# Patient Record
Sex: FEMALE | Race: WHITE | NOT HISPANIC OR LATINO | Employment: UNEMPLOYED | URBAN - METROPOLITAN AREA
[De-identification: names, ages, dates, MRNs, and addresses within clinical notes are randomized per-mention and may not be internally consistent; named-entity substitution may affect disease eponyms.]

---

## 2017-01-10 ENCOUNTER — COMMUNICATION - HEALTHEAST (OUTPATIENT)
Dept: NEUROLOGY | Facility: CLINIC | Age: 37
End: 2017-01-10

## 2017-02-09 ENCOUNTER — COMMUNICATION - HEALTHEAST (OUTPATIENT)
Dept: NEUROLOGY | Facility: CLINIC | Age: 37
End: 2017-02-09

## 2017-08-30 ENCOUNTER — COMMUNICATION - HEALTHEAST (OUTPATIENT)
Dept: NEUROLOGY | Facility: CLINIC | Age: 37
End: 2017-08-30

## 2017-08-30 ENCOUNTER — HOSPITAL ENCOUNTER (OUTPATIENT)
Dept: NEUROLOGY | Facility: CLINIC | Age: 37
Setting detail: THERAPIES SERIES
Discharge: STILL ADMITTED OR ADMINISTRATIVE | End: 2017-08-30
Attending: NURSE PRACTITIONER

## 2017-08-30 ENCOUNTER — AMBULATORY - HEALTHEAST (OUTPATIENT)
Dept: NEUROLOGY | Facility: CLINIC | Age: 37
End: 2017-08-30

## 2017-08-30 DIAGNOSIS — F60.3 BORDERLINE PERSONALITY DISORDER (H): ICD-10-CM

## 2017-08-30 DIAGNOSIS — F39 EPISODIC MOOD DISORDER (H): ICD-10-CM

## 2017-08-30 RX ORDER — FAMOTIDINE 20 MG/1
20 TABLET, FILM COATED ORAL 2 TIMES DAILY
Status: SHIPPED | COMMUNITY
Start: 2015-07-02

## 2017-08-30 RX ORDER — LAMOTRIGINE 150 MG/1
150 TABLET ORAL 2 TIMES DAILY
Status: SHIPPED | COMMUNITY
Start: 2017-08-30 | End: 2021-09-29

## 2017-08-30 ASSESSMENT — MIFFLIN-ST. JEOR: SCORE: 2243.93

## 2017-10-10 ENCOUNTER — HOSPITAL ENCOUNTER (OUTPATIENT)
Dept: NEUROLOGY | Facility: CLINIC | Age: 37
Setting detail: THERAPIES SERIES
Discharge: STILL ADMITTED OR ADMINISTRATIVE | End: 2017-10-10
Attending: PSYCHIATRY & NEUROLOGY

## 2017-10-10 DIAGNOSIS — S06.9XAA TBI (TRAUMATIC BRAIN INJURY) (H): ICD-10-CM

## 2017-10-10 RX ORDER — LEVETIRACETAM 500 MG/1
750 TABLET ORAL 2 TIMES DAILY
Status: SHIPPED | COMMUNITY
Start: 2017-09-27 | End: 2021-12-10

## 2017-10-12 ENCOUNTER — COMMUNICATION - HEALTHEAST (OUTPATIENT)
Dept: NEUROLOGY | Facility: CLINIC | Age: 37
End: 2017-10-12

## 2017-10-12 DIAGNOSIS — S06.9XAA TBI (TRAUMATIC BRAIN INJURY) (H): ICD-10-CM

## 2017-12-21 ENCOUNTER — COMMUNICATION - HEALTHEAST (OUTPATIENT)
Dept: NEUROLOGY | Facility: CLINIC | Age: 37
End: 2017-12-21

## 2017-12-21 DIAGNOSIS — F60.3 BORDERLINE PERSONALITY DISORDER (H): ICD-10-CM

## 2018-01-16 ENCOUNTER — RECORDS - HEALTHEAST (OUTPATIENT)
Dept: LAB | Facility: CLINIC | Age: 38
End: 2018-01-16

## 2018-01-16 LAB
ALBUMIN SERPL-MCNC: 3.5 G/DL (ref 3.5–5)
ALP SERPL-CCNC: 57 U/L (ref 45–120)
ALT SERPL W P-5'-P-CCNC: 38 U/L (ref 0–45)
ANION GAP SERPL CALCULATED.3IONS-SCNC: 11 MMOL/L (ref 5–18)
AST SERPL W P-5'-P-CCNC: 42 U/L (ref 0–40)
BILIRUB SERPL-MCNC: 0.7 MG/DL (ref 0–1)
BUN SERPL-MCNC: 7 MG/DL (ref 8–22)
CALCIUM SERPL-MCNC: 9.2 MG/DL (ref 8.5–10.5)
CHLORIDE BLD-SCNC: 102 MMOL/L (ref 98–107)
CHOLEST SERPL-MCNC: 228 MG/DL
CO2 SERPL-SCNC: 27 MMOL/L (ref 22–31)
CREAT SERPL-MCNC: 0.7 MG/DL (ref 0.6–1.1)
FASTING STATUS PATIENT QL REPORTED: NO
GFR SERPL CREATININE-BSD FRML MDRD: >60 ML/MIN/1.73M2
GLUCOSE BLD-MCNC: 92 MG/DL (ref 70–125)
HDLC SERPL-MCNC: 60 MG/DL
LDLC SERPL CALC-MCNC: 144 MG/DL
POTASSIUM BLD-SCNC: 4.2 MMOL/L (ref 3.5–5)
PROT SERPL-MCNC: 6.7 G/DL (ref 6–8)
SODIUM SERPL-SCNC: 140 MMOL/L (ref 136–145)
TRIGL SERPL-MCNC: 118 MG/DL

## 2018-01-24 ENCOUNTER — COMMUNICATION - HEALTHEAST (OUTPATIENT)
Dept: NEUROLOGY | Facility: CLINIC | Age: 38
End: 2018-01-24

## 2018-01-24 DIAGNOSIS — S06.9XAA TBI (TRAUMATIC BRAIN INJURY) (H): ICD-10-CM

## 2018-03-23 ENCOUNTER — RECORDS - HEALTHEAST (OUTPATIENT)
Dept: LAB | Facility: CLINIC | Age: 38
End: 2018-03-23

## 2018-03-23 LAB
ALBUMIN SERPL-MCNC: 3.3 G/DL (ref 3.5–5)
ALP SERPL-CCNC: 66 U/L (ref 45–120)
ALT SERPL W P-5'-P-CCNC: 40 U/L (ref 0–45)
ANION GAP SERPL CALCULATED.3IONS-SCNC: 11 MMOL/L (ref 5–18)
AST SERPL W P-5'-P-CCNC: 43 U/L (ref 0–40)
BILIRUB SERPL-MCNC: 0.6 MG/DL (ref 0–1)
BUN SERPL-MCNC: 8 MG/DL (ref 8–22)
CALCIUM SERPL-MCNC: 8.4 MG/DL (ref 8.5–10.5)
CHLORIDE BLD-SCNC: 101 MMOL/L (ref 98–107)
CHOLEST SERPL-MCNC: 233 MG/DL
CO2 SERPL-SCNC: 25 MMOL/L (ref 22–31)
CREAT SERPL-MCNC: 0.79 MG/DL (ref 0.6–1.1)
FASTING STATUS PATIENT QL REPORTED: NO
GFR SERPL CREATININE-BSD FRML MDRD: >60 ML/MIN/1.73M2
GLUCOSE BLD-MCNC: 107 MG/DL (ref 70–125)
HDLC SERPL-MCNC: 57 MG/DL
LDLC SERPL CALC-MCNC: 140 MG/DL
POTASSIUM BLD-SCNC: 4.1 MMOL/L (ref 3.5–5)
PROT SERPL-MCNC: 6.4 G/DL (ref 6–8)
SODIUM SERPL-SCNC: 137 MMOL/L (ref 136–145)
TRIGL SERPL-MCNC: 178 MG/DL

## 2018-03-27 ENCOUNTER — COMMUNICATION - HEALTHEAST (OUTPATIENT)
Dept: NEUROLOGY | Facility: CLINIC | Age: 38
End: 2018-03-27

## 2018-03-27 DIAGNOSIS — F60.3 BORDERLINE PERSONALITY DISORDER (H): ICD-10-CM

## 2018-05-30 ENCOUNTER — COMMUNICATION - HEALTHEAST (OUTPATIENT)
Dept: NEUROLOGY | Facility: CLINIC | Age: 38
End: 2018-05-30

## 2018-05-30 DIAGNOSIS — S06.9XAA TBI (TRAUMATIC BRAIN INJURY) (H): ICD-10-CM

## 2018-07-09 ENCOUNTER — RECORDS - HEALTHEAST (OUTPATIENT)
Dept: LAB | Facility: CLINIC | Age: 38
End: 2018-07-09

## 2018-07-09 LAB — HIV 1+2 AB+HIV1 P24 AG SERPL QL IA: NEGATIVE

## 2018-07-10 ENCOUNTER — COMMUNICATION - HEALTHEAST (OUTPATIENT)
Dept: NEUROLOGY | Facility: CLINIC | Age: 38
End: 2018-07-10

## 2018-07-10 LAB — HCV AB SERPL QL IA: NEGATIVE

## 2018-07-12 ENCOUNTER — HOSPITAL ENCOUNTER (OUTPATIENT)
Dept: NEUROLOGY | Facility: CLINIC | Age: 38
Setting detail: THERAPIES SERIES
Discharge: STILL ADMITTED OR ADMINISTRATIVE | End: 2018-07-12
Attending: NURSE PRACTITIONER

## 2018-07-12 DIAGNOSIS — S06.9XAA TBI (TRAUMATIC BRAIN INJURY) (H): ICD-10-CM

## 2018-07-12 DIAGNOSIS — F39 EPISODIC MOOD DISORDER (H): ICD-10-CM

## 2018-07-12 DIAGNOSIS — F60.3 BORDERLINE PERSONALITY DISORDER (H): ICD-10-CM

## 2018-07-20 ENCOUNTER — RECORDS - HEALTHEAST (OUTPATIENT)
Dept: ADMINISTRATIVE | Facility: OTHER | Age: 38
End: 2018-07-20

## 2018-08-01 ENCOUNTER — RECORDS - HEALTHEAST (OUTPATIENT)
Dept: ADMINISTRATIVE | Facility: OTHER | Age: 38
End: 2018-08-01

## 2018-09-02 ENCOUNTER — COMMUNICATION - HEALTHEAST (OUTPATIENT)
Dept: SCHEDULING | Facility: CLINIC | Age: 38
End: 2018-09-02

## 2018-09-26 ENCOUNTER — HOSPITAL ENCOUNTER (INPATIENT)
Dept: MEDSURG UNIT | Facility: CLINIC | Age: 38
Discharge: NURSING, REHAB OR LONG-TERM CARE | End: 2018-10-25
Attending: INTERNAL MEDICINE | Admitting: INTERNAL MEDICINE

## 2018-09-26 DIAGNOSIS — K59.00 CONSTIPATION, UNSPECIFIED CONSTIPATION TYPE: ICD-10-CM

## 2018-09-26 DIAGNOSIS — R23.9 ALTERATION IN SKIN INTEGRITY DUE TO MOISTURE: ICD-10-CM

## 2018-09-26 DIAGNOSIS — N17.9 ACUTE RENAL FAILURE (H): ICD-10-CM

## 2018-09-26 DIAGNOSIS — B36.9 FUNGAL RASH OF TORSO: ICD-10-CM

## 2018-09-26 DIAGNOSIS — L85.3 DRY SKIN: ICD-10-CM

## 2018-09-26 DIAGNOSIS — I51.89 DIASTOLIC DYSFUNCTION: ICD-10-CM

## 2018-09-26 DIAGNOSIS — L08.9 RIGHT FOOT INFECTION: ICD-10-CM

## 2018-09-26 DIAGNOSIS — Z87.891 PERSONAL HISTORY OF TOBACCO USE, PRESENTING HAZARDS TO HEALTH: ICD-10-CM

## 2018-09-26 DIAGNOSIS — R06.02 SOB (SHORTNESS OF BREATH): ICD-10-CM

## 2018-09-26 DIAGNOSIS — Z11.1 SCREENING-PULMONARY TB: ICD-10-CM

## 2018-09-26 DIAGNOSIS — E46 MALNUTRITION, UNSPECIFIED TYPE (H): ICD-10-CM

## 2018-09-26 DIAGNOSIS — I10 ESSENTIAL HYPERTENSION: ICD-10-CM

## 2018-09-26 DIAGNOSIS — B36.9 FUNGAL DERMATITIS: ICD-10-CM

## 2018-09-26 DIAGNOSIS — T80.1XXA IV INFILTRATE, INITIAL ENCOUNTER: ICD-10-CM

## 2018-09-26 DIAGNOSIS — T81.89XA SURGICAL WOUND, NON HEALING, INITIAL ENCOUNTER: ICD-10-CM

## 2018-09-26 DIAGNOSIS — Z78.9 DEEP VEIN THROMBOSIS (DVT) PROPHYLAXIS PRESCRIBED AT DISCHARGE: ICD-10-CM

## 2018-09-26 LAB
GLUCOSE BLDC GLUCOMTR-MCNC: 121 MG/DL
GLUCOSE BLDC GLUCOMTR-MCNC: 93 MG/DL
GLUCOSE BLDC GLUCOMTR-MCNC: 95 MG/DL

## 2018-09-26 RX ORDER — TORSEMIDE 20 MG/1
40 TABLET ORAL
Status: SHIPPED | COMMUNITY
Start: 2018-09-26

## 2018-09-26 RX ORDER — POTASSIUM CHLORIDE 1500 MG/1
20 TABLET, EXTENDED RELEASE ORAL 2 TIMES DAILY WITH MEALS
Status: SHIPPED | COMMUNITY
Start: 2018-09-26

## 2018-09-26 RX ORDER — MECLIZINE HYDROCHLORIDE 25 MG/1
25 TABLET ORAL 3 TIMES DAILY PRN
Status: SHIPPED | COMMUNITY
Start: 2018-09-26

## 2018-09-27 LAB
ALBUMIN SERPL-MCNC: 3.2 G/DL (ref 3.5–5)
ALP SERPL-CCNC: 48 U/L (ref 45–120)
ALT SERPL W P-5'-P-CCNC: 18 U/L (ref 0–45)
ANION GAP SERPL CALCULATED.3IONS-SCNC: 10 MMOL/L (ref 5–18)
AST SERPL W P-5'-P-CCNC: 24 U/L (ref 0–40)
BILIRUB DIRECT SERPL-MCNC: 0.2 MG/DL
BILIRUB SERPL-MCNC: 0.5 MG/DL (ref 0–1)
BUN SERPL-MCNC: 11 MG/DL (ref 8–22)
CALCIUM SERPL-MCNC: 9.7 MG/DL (ref 8.5–10.5)
CHLORIDE BLD-SCNC: 93 MMOL/L (ref 98–107)
CO2 SERPL-SCNC: 36 MMOL/L (ref 22–31)
CREAT SERPL-MCNC: 0.75 MG/DL (ref 0.6–1.1)
ERYTHROCYTE [DISTWIDTH] IN BLOOD BY AUTOMATED COUNT: 14 % (ref 11–14.5)
GFR SERPL CREATININE-BSD FRML MDRD: >60 ML/MIN/1.73M2
GLUCOSE BLD-MCNC: 143 MG/DL (ref 70–125)
GLUCOSE BLDC GLUCOMTR-MCNC: 107 MG/DL
GLUCOSE BLDC GLUCOMTR-MCNC: 113 MG/DL
GLUCOSE BLDC GLUCOMTR-MCNC: 87 MG/DL
GLUCOSE BLDC GLUCOMTR-MCNC: 98 MG/DL
HCT VFR BLD AUTO: 36.5 % (ref 35–47)
HGB BLD-MCNC: 11.9 G/DL (ref 12–16)
INR PPP: 1.03 (ref 0.9–1.1)
MAGNESIUM SERPL-MCNC: 1.9 MG/DL (ref 1.8–2.6)
MCH RBC QN AUTO: 29.3 PG (ref 27–34)
MCHC RBC AUTO-ENTMCNC: 32.6 G/DL (ref 32–36)
MCV RBC AUTO: 90 FL (ref 80–100)
PLATELET # BLD AUTO: 215 THOU/UL (ref 140–440)
PMV BLD AUTO: 9.8 FL (ref 8.5–12.5)
POTASSIUM BLD-SCNC: 3.1 MMOL/L (ref 3.5–5)
POTASSIUM BLD-SCNC: 3.7 MMOL/L (ref 3.5–5)
PROT SERPL-MCNC: 6.6 G/DL (ref 6–8)
RBC # BLD AUTO: 4.06 MILL/UL (ref 3.8–5.4)
SODIUM SERPL-SCNC: 139 MMOL/L (ref 136–145)
WBC: 6.3 THOU/UL (ref 4–11)

## 2018-09-28 LAB
ANION GAP SERPL CALCULATED.3IONS-SCNC: 11 MMOL/L (ref 5–18)
BACTERIA SPEC CULT: NORMAL
BUN SERPL-MCNC: 10 MG/DL (ref 8–22)
CALCIUM SERPL-MCNC: 9.2 MG/DL (ref 8.5–10.5)
CHLORIDE BLD-SCNC: 99 MMOL/L (ref 98–107)
CO2 SERPL-SCNC: 30 MMOL/L (ref 22–31)
CREAT SERPL-MCNC: 0.69 MG/DL (ref 0.6–1.1)
GFR SERPL CREATININE-BSD FRML MDRD: >60 ML/MIN/1.73M2
GLUCOSE BLD-MCNC: 95 MG/DL (ref 70–125)
GLUCOSE BLDC GLUCOMTR-MCNC: 104 MG/DL
PLATELET # BLD AUTO: 181 THOU/UL (ref 140–440)
POTASSIUM BLD-SCNC: 4 MMOL/L (ref 3.5–5)
POTASSIUM BLD-SCNC: 4 MMOL/L (ref 3.5–5)
SODIUM SERPL-SCNC: 140 MMOL/L (ref 136–145)

## 2018-09-29 LAB — POTASSIUM BLD-SCNC: 4 MMOL/L (ref 3.5–5)

## 2018-09-29 ASSESSMENT — MIFFLIN-ST. JEOR: SCORE: 2338.73

## 2018-09-30 LAB
ANION GAP SERPL CALCULATED.3IONS-SCNC: 11 MMOL/L (ref 5–18)
BUN SERPL-MCNC: 11 MG/DL (ref 8–22)
CALCIUM SERPL-MCNC: 9.3 MG/DL (ref 8.5–10.5)
CHLORIDE BLD-SCNC: 98 MMOL/L (ref 98–107)
CO2 SERPL-SCNC: 27 MMOL/L (ref 22–31)
CREAT SERPL-MCNC: 0.73 MG/DL (ref 0.6–1.1)
ERYTHROCYTE [DISTWIDTH] IN BLOOD BY AUTOMATED COUNT: 13.9 % (ref 11–14.5)
GFR SERPL CREATININE-BSD FRML MDRD: >60 ML/MIN/1.73M2
GLUCOSE BLD-MCNC: 93 MG/DL (ref 70–125)
HCT VFR BLD AUTO: 35.6 % (ref 35–47)
HGB BLD-MCNC: 11.5 G/DL (ref 12–16)
MAGNESIUM SERPL-MCNC: 2 MG/DL (ref 1.8–2.6)
MCH RBC QN AUTO: 29.2 PG (ref 27–34)
MCHC RBC AUTO-ENTMCNC: 32.3 G/DL (ref 32–36)
MCV RBC AUTO: 90 FL (ref 80–100)
PLATELET # BLD AUTO: 176 THOU/UL (ref 140–440)
PMV BLD AUTO: 10.2 FL (ref 8.5–12.5)
POTASSIUM BLD-SCNC: 4.5 MMOL/L (ref 3.5–5)
RBC # BLD AUTO: 3.94 MILL/UL (ref 3.8–5.4)
SODIUM SERPL-SCNC: 136 MMOL/L (ref 136–145)
WBC: 7.4 THOU/UL (ref 4–11)

## 2018-09-30 ASSESSMENT — MIFFLIN-ST. JEOR: SCORE: 2365.04

## 2018-10-01 LAB
GLUCOSE BLDC GLUCOMTR-MCNC: 138 MG/DL
GLUCOSE BLDC GLUCOMTR-MCNC: 188 MG/DL
GLUCOSE BLDC GLUCOMTR-MCNC: 196 MG/DL
POTASSIUM BLD-SCNC: 3.7 MMOL/L (ref 3.5–5)
VANCOMYCIN SERPL-MCNC: 20.5 UG/ML

## 2018-10-01 ASSESSMENT — MIFFLIN-ST. JEOR: SCORE: 2371.85

## 2018-10-02 LAB
BUN SERPL-MCNC: 11 MG/DL (ref 8–22)
CREAT SERPL-MCNC: 0.7 MG/DL (ref 0.6–1.1)
GFR SERPL CREATININE-BSD FRML MDRD: >60 ML/MIN/1.73M2
GLUCOSE BLDC GLUCOMTR-MCNC: 124 MG/DL
GLUCOSE BLDC GLUCOMTR-MCNC: 132 MG/DL
GLUCOSE BLDC GLUCOMTR-MCNC: 174 MG/DL
GLUCOSE BLDC GLUCOMTR-MCNC: 91 MG/DL
PLATELET # BLD AUTO: 171 THOU/UL (ref 140–440)
POTASSIUM BLD-SCNC: 4.3 MMOL/L (ref 3.5–5)

## 2018-10-03 LAB
GLUCOSE BLDC GLUCOMTR-MCNC: 189 MG/DL
GLUCOSE BLDC GLUCOMTR-MCNC: 291 MG/DL
GLUCOSE BLDC GLUCOMTR-MCNC: 83 MG/DL
GLUCOSE BLDC GLUCOMTR-MCNC: 92 MG/DL

## 2018-10-03 ASSESSMENT — MIFFLIN-ST. JEOR: SCORE: 2328.75

## 2018-10-04 LAB
ANION GAP SERPL CALCULATED.3IONS-SCNC: 10 MMOL/L (ref 5–18)
BUN SERPL-MCNC: 18 MG/DL (ref 8–22)
CALCIUM SERPL-MCNC: 9.5 MG/DL (ref 8.5–10.5)
CHLORIDE BLD-SCNC: 100 MMOL/L (ref 98–107)
CO2 SERPL-SCNC: 29 MMOL/L (ref 22–31)
CREAT SERPL-MCNC: 0.72 MG/DL (ref 0.6–1.1)
GFR SERPL CREATININE-BSD FRML MDRD: >60 ML/MIN/1.73M2
GLUCOSE BLD-MCNC: 87 MG/DL (ref 70–125)
GLUCOSE BLDC GLUCOMTR-MCNC: 112 MG/DL
GLUCOSE BLDC GLUCOMTR-MCNC: 159 MG/DL
GLUCOSE BLDC GLUCOMTR-MCNC: 161 MG/DL
GLUCOSE BLDC GLUCOMTR-MCNC: 97 MG/DL
HGB BLD-MCNC: 11.3 G/DL (ref 12–16)
MAGNESIUM SERPL-MCNC: 2.5 MG/DL (ref 1.8–2.6)
POTASSIUM BLD-SCNC: 4.1 MMOL/L (ref 3.5–5)
SODIUM SERPL-SCNC: 139 MMOL/L (ref 136–145)

## 2018-10-05 LAB
GLUCOSE BLDC GLUCOMTR-MCNC: 155 MG/DL
GLUCOSE BLDC GLUCOMTR-MCNC: 98 MG/DL

## 2018-10-06 ASSESSMENT — MIFFLIN-ST. JEOR: SCORE: 2288.38

## 2018-10-07 LAB
ANION GAP SERPL CALCULATED.3IONS-SCNC: 10 MMOL/L (ref 5–18)
BUN SERPL-MCNC: 14 MG/DL (ref 8–22)
CALCIUM SERPL-MCNC: 9.5 MG/DL (ref 8.5–10.5)
CHLORIDE BLD-SCNC: 97 MMOL/L (ref 98–107)
CO2 SERPL-SCNC: 32 MMOL/L (ref 22–31)
CREAT SERPL-MCNC: 0.77 MG/DL (ref 0.6–1.1)
GFR SERPL CREATININE-BSD FRML MDRD: >60 ML/MIN/1.73M2
GLUCOSE BLD-MCNC: 93 MG/DL (ref 70–125)
POTASSIUM BLD-SCNC: 3.8 MMOL/L (ref 3.5–5)
SODIUM SERPL-SCNC: 139 MMOL/L (ref 136–145)

## 2018-10-07 ASSESSMENT — MIFFLIN-ST. JEOR: SCORE: 2260.26

## 2018-10-08 LAB
ANION GAP SERPL CALCULATED.3IONS-SCNC: 10 MMOL/L (ref 5–18)
BUN SERPL-MCNC: 18 MG/DL (ref 8–22)
CALCIUM SERPL-MCNC: 9.6 MG/DL (ref 8.5–10.5)
CHLORIDE BLD-SCNC: 97 MMOL/L (ref 98–107)
CO2 SERPL-SCNC: 31 MMOL/L (ref 22–31)
CREAT SERPL-MCNC: 0.76 MG/DL (ref 0.6–1.1)
GFR SERPL CREATININE-BSD FRML MDRD: >60 ML/MIN/1.73M2
GLUCOSE BLD-MCNC: 100 MG/DL (ref 70–125)
POTASSIUM BLD-SCNC: 3.7 MMOL/L (ref 3.5–5)
SODIUM SERPL-SCNC: 138 MMOL/L (ref 136–145)

## 2018-10-08 ASSESSMENT — MIFFLIN-ST. JEOR: SCORE: 2259.35

## 2018-10-09 ASSESSMENT — MIFFLIN-ST. JEOR: SCORE: 2278.4

## 2018-10-10 LAB — GLUCOSE BLDC GLUCOMTR-MCNC: 115 MG/DL

## 2018-10-12 ASSESSMENT — MIFFLIN-ST. JEOR: SCORE: 2283.85

## 2018-10-13 ASSESSMENT — MIFFLIN-ST. JEOR: SCORE: 2250.28

## 2018-10-15 LAB
BASOPHILS # BLD AUTO: 0 THOU/UL (ref 0–0.2)
BASOPHILS NFR BLD AUTO: 0 % (ref 0–2)
C REACTIVE PROTEIN LHE: 5.3 MG/DL (ref 0–0.8)
EOSINOPHIL # BLD AUTO: 0.3 THOU/UL (ref 0–0.4)
EOSINOPHIL NFR BLD AUTO: 4 % (ref 0–6)
ERYTHROCYTE [DISTWIDTH] IN BLOOD BY AUTOMATED COUNT: 14 % (ref 11–14.5)
HCT VFR BLD AUTO: 37.9 % (ref 35–47)
HGB BLD-MCNC: 12.4 G/DL (ref 12–16)
LYMPHOCYTES # BLD AUTO: 2.1 THOU/UL (ref 0.8–4.4)
LYMPHOCYTES NFR BLD AUTO: 23 % (ref 20–40)
MCH RBC QN AUTO: 28.6 PG (ref 27–34)
MCHC RBC AUTO-ENTMCNC: 32.7 G/DL (ref 32–36)
MCV RBC AUTO: 88 FL (ref 80–100)
MONOCYTES # BLD AUTO: 0.6 THOU/UL (ref 0–0.9)
MONOCYTES NFR BLD AUTO: 6 % (ref 2–10)
NEUTROPHILS # BLD AUTO: 6.3 THOU/UL (ref 2–7.7)
NEUTROPHILS NFR BLD AUTO: 67 % (ref 50–70)
PLATELET # BLD AUTO: 168 THOU/UL (ref 140–440)
PMV BLD AUTO: 10.1 FL (ref 8.5–12.5)
RBC # BLD AUTO: 4.33 MILL/UL (ref 3.8–5.4)
WBC: 9.4 THOU/UL (ref 4–11)

## 2018-10-15 ASSESSMENT — MIFFLIN-ST. JEOR: SCORE: 2287.48

## 2018-10-16 ENCOUNTER — COMMUNICATION - HEALTHEAST (OUTPATIENT)
Dept: NEUROLOGY | Facility: CLINIC | Age: 38
End: 2018-10-16

## 2018-10-16 ASSESSMENT — MIFFLIN-ST. JEOR: SCORE: 2287.48

## 2018-10-17 LAB
ANION GAP SERPL CALCULATED.3IONS-SCNC: 12 MMOL/L (ref 5–18)
BUN SERPL-MCNC: 17 MG/DL (ref 8–22)
CALCIUM SERPL-MCNC: 9.9 MG/DL (ref 8.5–10.5)
CHLORIDE BLD-SCNC: 98 MMOL/L (ref 98–107)
CO2 SERPL-SCNC: 29 MMOL/L (ref 22–31)
CREAT SERPL-MCNC: 0.68 MG/DL (ref 0.6–1.1)
GFR SERPL CREATININE-BSD FRML MDRD: >60 ML/MIN/1.73M2
GLUCOSE BLD-MCNC: 121 MG/DL (ref 70–125)
MAGNESIUM SERPL-MCNC: 2 MG/DL (ref 1.8–2.6)
POTASSIUM BLD-SCNC: 3.9 MMOL/L (ref 3.5–5)
SODIUM SERPL-SCNC: 139 MMOL/L (ref 136–145)

## 2018-10-17 ASSESSMENT — MIFFLIN-ST. JEOR: SCORE: 2301.99

## 2018-10-20 ASSESSMENT — MIFFLIN-ST. JEOR: SCORE: 2292.01

## 2018-10-21 ASSESSMENT — MIFFLIN-ST. JEOR: SCORE: 2285.66

## 2018-10-22 ASSESSMENT — MIFFLIN-ST. JEOR: SCORE: 2318.77

## 2018-10-23 LAB
ANION GAP SERPL CALCULATED.3IONS-SCNC: 11 MMOL/L (ref 5–18)
BUN SERPL-MCNC: 21 MG/DL (ref 8–22)
CALCIUM SERPL-MCNC: 9.6 MG/DL (ref 8.5–10.5)
CHLORIDE BLD-SCNC: 99 MMOL/L (ref 98–107)
CO2 SERPL-SCNC: 28 MMOL/L (ref 22–31)
CREAT SERPL-MCNC: 0.74 MG/DL (ref 0.6–1.1)
ERYTHROCYTE [DISTWIDTH] IN BLOOD BY AUTOMATED COUNT: 14.7 % (ref 11–14.5)
GFR SERPL CREATININE-BSD FRML MDRD: >60 ML/MIN/1.73M2
GLUCOSE BLD-MCNC: 198 MG/DL (ref 70–125)
HCT VFR BLD AUTO: 36.9 % (ref 35–47)
HGB BLD-MCNC: 11.9 G/DL (ref 12–16)
MAGNESIUM SERPL-MCNC: 1.9 MG/DL (ref 1.8–2.6)
MCH RBC QN AUTO: 28.6 PG (ref 27–34)
MCHC RBC AUTO-ENTMCNC: 32.2 G/DL (ref 32–36)
MCV RBC AUTO: 89 FL (ref 80–100)
PLATELET # BLD AUTO: 197 THOU/UL (ref 140–440)
PMV BLD AUTO: 10.1 FL (ref 8.5–12.5)
POTASSIUM BLD-SCNC: 3.8 MMOL/L (ref 3.5–5)
RBC # BLD AUTO: 4.16 MILL/UL (ref 3.8–5.4)
SODIUM SERPL-SCNC: 138 MMOL/L (ref 136–145)
WBC: 7.1 THOU/UL (ref 4–11)

## 2018-10-24 RX ORDER — BISACODYL 10 MG
SUPPOSITORY, RECTAL RECTAL
Refills: 0 | Status: SHIPPED
Start: 2018-10-24

## 2018-10-24 RX ORDER — ARGININE/GLUTAMINE/CALCIUM BMB 7G-7G-1.5G
1 POWDER IN PACKET (EA) ORAL 2 TIMES DAILY
Refills: 0 | Status: SHIPPED
Start: 2018-10-24

## 2018-10-24 RX ORDER — METOPROLOL TARTRATE 25 MG/1
12.5 TABLET, FILM COATED ORAL 2 TIMES DAILY
Refills: 0 | Status: SHIPPED
Start: 2018-10-24

## 2018-10-24 RX ORDER — MINERAL OIL/HYDROPHIL PETROLAT
OINTMENT (GRAM) TOPICAL
Refills: 0 | Status: SHIPPED
Start: 2018-10-24

## 2018-10-24 RX ORDER — ALBUTEROL SULFATE 0.83 MG/ML
2.5 SOLUTION RESPIRATORY (INHALATION) EVERY 4 HOURS PRN
Refills: 0 | Status: SHIPPED
Start: 2018-10-24

## 2018-10-24 RX ORDER — AMLODIPINE BESYLATE 10 MG/1
10 TABLET ORAL EVERY MORNING
Refills: 0 | Status: SHIPPED
Start: 2018-10-25

## 2018-10-24 RX ORDER — MULTIPLE VITAMINS W/ MINERALS TAB 9MG-400MCG
1 TAB ORAL DAILY
Refills: 0 | Status: SHIPPED
Start: 2018-10-25

## 2018-10-25 RX ORDER — FOLIC ACID 1 MG/1
1 TABLET ORAL DAILY
Refills: 0 | Status: SHIPPED
Start: 2018-10-25

## 2018-10-29 ENCOUNTER — RECORDS - HEALTHEAST (OUTPATIENT)
Dept: LAB | Facility: CLINIC | Age: 38
End: 2018-10-29

## 2018-10-29 LAB
ANION GAP SERPL CALCULATED.3IONS-SCNC: 10 MMOL/L (ref 5–18)
BUN SERPL-MCNC: 10 MG/DL (ref 8–22)
CALCIUM SERPL-MCNC: 9.4 MG/DL (ref 8.5–10.5)
CHLORIDE BLD-SCNC: 99 MMOL/L (ref 98–107)
CO2 SERPL-SCNC: 31 MMOL/L (ref 22–31)
CREAT SERPL-MCNC: 0.59 MG/DL (ref 0.6–1.1)
ERYTHROCYTE [DISTWIDTH] IN BLOOD BY AUTOMATED COUNT: 14.4 % (ref 11–14.5)
GFR SERPL CREATININE-BSD FRML MDRD: >60 ML/MIN/1.73M2
GLUCOSE BLD-MCNC: 101 MG/DL (ref 70–125)
HCT VFR BLD AUTO: 36.7 % (ref 35–47)
HGB BLD-MCNC: 11.4 G/DL (ref 12–16)
MAGNESIUM SERPL-MCNC: 1.8 MG/DL (ref 1.8–2.6)
MCH RBC QN AUTO: 28.2 PG (ref 27–34)
MCHC RBC AUTO-ENTMCNC: 31.1 G/DL (ref 32–36)
MCV RBC AUTO: 91 FL (ref 80–100)
PLATELET # BLD AUTO: 188 THOU/UL (ref 140–440)
PMV BLD AUTO: 10.1 FL (ref 8.5–12.5)
POTASSIUM BLD-SCNC: 3.7 MMOL/L (ref 3.5–5)
RBC # BLD AUTO: 4.04 MILL/UL (ref 3.8–5.4)
SODIUM SERPL-SCNC: 140 MMOL/L (ref 136–145)
WBC: 7.9 THOU/UL (ref 4–11)

## 2019-01-02 ENCOUNTER — RECORDS - HEALTHEAST (OUTPATIENT)
Dept: ADMINISTRATIVE | Facility: OTHER | Age: 39
End: 2019-01-02

## 2019-01-30 ENCOUNTER — COMMUNICATION - HEALTHEAST (OUTPATIENT)
Dept: NEUROLOGY | Facility: CLINIC | Age: 39
End: 2019-01-30

## 2019-01-30 DIAGNOSIS — F60.3 BORDERLINE PERSONALITY DISORDER (H): ICD-10-CM

## 2019-03-28 ENCOUNTER — COMMUNICATION - HEALTHEAST (OUTPATIENT)
Dept: NEUROLOGY | Facility: CLINIC | Age: 39
End: 2019-03-28

## 2019-03-28 DIAGNOSIS — S06.9XAA TBI (TRAUMATIC BRAIN INJURY) (H): ICD-10-CM

## 2019-05-08 ENCOUNTER — COMMUNICATION - HEALTHEAST (OUTPATIENT)
Dept: NEUROLOGY | Facility: CLINIC | Age: 39
End: 2019-05-08

## 2019-05-08 DIAGNOSIS — S06.9XAA TBI (TRAUMATIC BRAIN INJURY) (H): ICD-10-CM

## 2019-08-12 ENCOUNTER — COMMUNICATION - HEALTHEAST (OUTPATIENT)
Dept: NEUROLOGY | Facility: CLINIC | Age: 39
End: 2019-08-12

## 2019-08-12 DIAGNOSIS — F60.3 BORDERLINE PERSONALITY DISORDER (H): ICD-10-CM

## 2020-01-02 ENCOUNTER — COMMUNICATION - HEALTHEAST (OUTPATIENT)
Dept: NEUROLOGY | Facility: CLINIC | Age: 40
End: 2020-01-02

## 2020-01-02 DIAGNOSIS — S06.9XAA TBI (TRAUMATIC BRAIN INJURY) (H): ICD-10-CM

## 2020-01-02 DIAGNOSIS — F60.3 BORDERLINE PERSONALITY DISORDER (H): ICD-10-CM

## 2020-01-02 RX ORDER — ARIPIPRAZOLE 5 MG/1
5 TABLET ORAL DAILY
Qty: 30 TABLET | Refills: 4 | Status: SHIPPED | OUTPATIENT
Start: 2020-01-02

## 2020-01-02 RX ORDER — OLANZAPINE 2.5 MG/1
TABLET, FILM COATED ORAL
Qty: 90 TABLET | Refills: 4 | Status: SHIPPED | OUTPATIENT
Start: 2020-01-02

## 2020-01-02 RX ORDER — VENLAFAXINE HYDROCHLORIDE 37.5 MG/1
37.5 CAPSULE, EXTENDED RELEASE ORAL DAILY
Qty: 30 CAPSULE | Refills: 4 | Status: SHIPPED | OUTPATIENT
Start: 2020-01-02

## 2020-06-30 ENCOUNTER — RECORDS - HEALTHEAST (OUTPATIENT)
Dept: LAB | Facility: CLINIC | Age: 40
End: 2020-06-30

## 2020-06-30 LAB
ALBUMIN SERPL-MCNC: 3.7 G/DL (ref 3.5–5)
ALP SERPL-CCNC: 68 U/L (ref 45–120)
ALT SERPL W P-5'-P-CCNC: 12 U/L (ref 0–45)
ANION GAP SERPL CALCULATED.3IONS-SCNC: 13 MMOL/L (ref 5–18)
AST SERPL W P-5'-P-CCNC: 15 U/L (ref 0–40)
BILIRUB SERPL-MCNC: 0.3 MG/DL (ref 0–1)
BUN SERPL-MCNC: 9 MG/DL (ref 8–22)
CALCIUM SERPL-MCNC: 9.4 MG/DL (ref 8.5–10.5)
CHLORIDE BLD-SCNC: 99 MMOL/L (ref 98–107)
CHOLEST SERPL-MCNC: 203 MG/DL
CO2 SERPL-SCNC: 25 MMOL/L (ref 22–31)
CREAT SERPL-MCNC: 0.73 MG/DL (ref 0.6–1.1)
FASTING STATUS PATIENT QL REPORTED: YES
GFR SERPL CREATININE-BSD FRML MDRD: >60 ML/MIN/1.73M2
GLUCOSE BLD-MCNC: 98 MG/DL (ref 70–125)
HDLC SERPL-MCNC: 45 MG/DL
LDLC SERPL CALC-MCNC: 121 MG/DL
POTASSIUM BLD-SCNC: 4.1 MMOL/L (ref 3.5–5)
PROT SERPL-MCNC: 7.3 G/DL (ref 6–8)
SODIUM SERPL-SCNC: 137 MMOL/L (ref 136–145)
TRIGL SERPL-MCNC: 184 MG/DL

## 2020-07-01 LAB — HBA1C MFR BLD: 5.3 %

## 2020-07-15 ENCOUNTER — COMMUNICATION - HEALTHEAST (OUTPATIENT)
Dept: VASCULAR SURGERY | Facility: CLINIC | Age: 40
End: 2020-07-15

## 2020-07-15 ENCOUNTER — AMBULATORY - HEALTHEAST (OUTPATIENT)
Dept: VASCULAR SURGERY | Facility: CLINIC | Age: 40
End: 2020-07-15

## 2020-07-15 DIAGNOSIS — R60.0 BILATERAL LEG EDEMA: ICD-10-CM

## 2020-10-26 ENCOUNTER — OFFICE VISIT - HEALTHEAST (OUTPATIENT)
Dept: VASCULAR SURGERY | Facility: CLINIC | Age: 40
End: 2020-10-26

## 2020-10-26 DIAGNOSIS — I89.0 ACQUIRED LYMPHEDEMA OF LEG: ICD-10-CM

## 2020-10-26 DIAGNOSIS — I87.8 VENOUS STASIS: ICD-10-CM

## 2020-10-26 DIAGNOSIS — M79.89 LEG SWELLING: ICD-10-CM

## 2020-10-26 DIAGNOSIS — I87.323 CHRONIC VENOUS HYPERTENSION (IDIOPATHIC) WITH INFLAMMATION OF BILATERAL LOWER EXTREMITY: ICD-10-CM

## 2020-10-26 DIAGNOSIS — Z72.0 TOBACCO ABUSE: ICD-10-CM

## 2020-10-26 DIAGNOSIS — E66.01 MORBID OBESITY WITH BMI OF 60.0-69.9, ADULT (H): ICD-10-CM

## 2020-10-26 DIAGNOSIS — I73.9 CLAUDICATION (H): ICD-10-CM

## 2020-10-26 DIAGNOSIS — I69.351 HEMIPARESIS AFFECTING RIGHT SIDE AS LATE EFFECT OF CEREBROVASCULAR ACCIDENT (CVA) (H): ICD-10-CM

## 2020-10-26 DIAGNOSIS — I11.0 HYPERTENSIVE HEART FAILURE (H): ICD-10-CM

## 2020-10-26 RX ORDER — GABAPENTIN 300 MG/1
900 CAPSULE ORAL AT BEDTIME
Status: SHIPPED | COMMUNITY
Start: 2019-12-18 | End: 2021-12-10

## 2020-10-26 RX ORDER — BISOPROLOL FUMARATE 5 MG/1
2.5 TABLET, FILM COATED ORAL DAILY
Status: SHIPPED | COMMUNITY
Start: 2020-08-20 | End: 2021-09-17

## 2020-10-26 RX ORDER — GABAPENTIN 300 MG/1
600 CAPSULE ORAL 2 TIMES DAILY
Status: SHIPPED | COMMUNITY
Start: 2018-12-18

## 2020-10-26 RX ORDER — CHLORAL HYDRATE 500 MG
1 CAPSULE ORAL 2 TIMES DAILY
Status: SHIPPED | COMMUNITY
Start: 2020-05-22

## 2020-10-26 RX ORDER — IPRATROPIUM BROMIDE AND ALBUTEROL SULFATE 2.5; .5 MG/3ML; MG/3ML
SOLUTION RESPIRATORY (INHALATION) EVERY 4 HOURS
Status: SHIPPED | COMMUNITY
Start: 2019-01-02

## 2020-10-26 RX ORDER — NICOTINE 21 MG/24HR
1 PATCH, TRANSDERMAL 24 HOURS TRANSDERMAL DAILY
Status: SHIPPED | COMMUNITY
Start: 2020-08-20

## 2020-10-26 RX ORDER — SPIRONOLACTONE 25 MG/1
25 TABLET ORAL EVERY MORNING
Status: SHIPPED | COMMUNITY
Start: 2019-11-28

## 2020-10-26 RX ORDER — BUMETANIDE 2 MG/1
2 TABLET ORAL 2 TIMES DAILY
Status: SHIPPED | COMMUNITY
Start: 2020-02-15

## 2020-10-26 ASSESSMENT — MIFFLIN-ST. JEOR: SCORE: 2309.7

## 2020-10-29 ENCOUNTER — RECORDS - HEALTHEAST (OUTPATIENT)
Dept: VASCULAR ULTRASOUND | Facility: CLINIC | Age: 40
End: 2020-10-29

## 2020-10-29 DIAGNOSIS — I87.323 CHRONIC VENOUS HYPERTENSION (IDIOPATHIC) WITH INFLAMMATION OF BILATERAL LOWER EXTREMITY: ICD-10-CM

## 2020-10-29 DIAGNOSIS — I69.351 HEMIPLEGIA AND HEMIPARESIS FOLLOWING CEREBRAL INFARCTION AFFECTING RIGHT DOMINANT SIDE (H): ICD-10-CM

## 2020-10-29 DIAGNOSIS — M79.89 OTHER SPECIFIED SOFT TISSUE DISORDERS: ICD-10-CM

## 2020-10-29 DIAGNOSIS — Z72.0 TOBACCO USE: ICD-10-CM

## 2020-10-29 DIAGNOSIS — E66.01 MORBID (SEVERE) OBESITY DUE TO EXCESS CALORIES (H): ICD-10-CM

## 2020-10-29 DIAGNOSIS — I11.0 HYPERTENSIVE HEART DISEASE WITH HEART FAILURE (H): ICD-10-CM

## 2020-10-29 DIAGNOSIS — I73.9 PERIPHERAL VASCULAR DISEASE, UNSPECIFIED (H): ICD-10-CM

## 2020-10-29 DIAGNOSIS — I87.8 OTHER SPECIFIED DISORDERS OF VEINS: ICD-10-CM

## 2020-10-29 DIAGNOSIS — I89.0 LYMPHEDEMA, NOT ELSEWHERE CLASSIFIED: ICD-10-CM

## 2020-11-03 ENCOUNTER — COMMUNICATION - HEALTHEAST (OUTPATIENT)
Dept: VASCULAR SURGERY | Facility: CLINIC | Age: 40
End: 2020-11-03

## 2020-11-04 ENCOUNTER — COMMUNICATION - HEALTHEAST (OUTPATIENT)
Dept: FAMILY MEDICINE | Facility: CLINIC | Age: 40
End: 2020-11-04

## 2020-11-06 ENCOUNTER — OFFICE VISIT - HEALTHEAST (OUTPATIENT)
Dept: FAMILY MEDICINE | Facility: CLINIC | Age: 40
End: 2020-11-06

## 2020-11-06 ENCOUNTER — COMMUNICATION - HEALTHEAST (OUTPATIENT)
Dept: FAMILY MEDICINE | Facility: CLINIC | Age: 40
End: 2020-11-06

## 2020-11-06 DIAGNOSIS — E66.813 CLASS 3 OBESITY WITH ALVEOLAR HYPOVENTILATION, SERIOUS COMORBIDITY, AND BODY MASS INDEX (BMI) OF 60.0 TO 69.9 IN ADULT (H): ICD-10-CM

## 2020-11-06 DIAGNOSIS — I10 ESSENTIAL HYPERTENSION: ICD-10-CM

## 2020-11-06 DIAGNOSIS — G47.33 OSA (OBSTRUCTIVE SLEEP APNEA): ICD-10-CM

## 2020-11-06 DIAGNOSIS — E66.2 CLASS 3 OBESITY WITH ALVEOLAR HYPOVENTILATION, SERIOUS COMORBIDITY, AND BODY MASS INDEX (BMI) OF 60.0 TO 69.9 IN ADULT (H): ICD-10-CM

## 2020-11-06 DIAGNOSIS — E78.5 HYPERLIPIDEMIA, UNSPECIFIED HYPERLIPIDEMIA TYPE: ICD-10-CM

## 2020-11-06 DIAGNOSIS — E11.9 TYPE 2 DIABETES MELLITUS WITHOUT COMPLICATION, WITHOUT LONG-TERM CURRENT USE OF INSULIN (H): ICD-10-CM

## 2020-11-06 ASSESSMENT — MIFFLIN-ST. JEOR: SCORE: 2309.7

## 2020-11-06 NOTE — ASSESSMENT & PLAN NOTE
ASSESSMENT/PLAN  PATIENT IS BEGINNING ACTIVE MEDICALLY SUPERVISED WEIGHT LOSS STARTING AT Body mass index is 68.41 kg/m .     Patient declined 24 week weight loss program due to cost  Patient declined  3 pack health coaching due to cost.   We did not discuss food supplements.     We discussed surgical weight loss options given her BMI is 68 and she  has comorbid conditions:multiple see problem list.      Medications started today: saxenda, if not affordable try victoza or naltrexone.     Current goal(s): switch sugar beverages to water.     Lab assessment plan: declinedlabs due to immobility     Follow up 11/11/20 with dietician and in 1 month with me. .     DISCUSSION _________________________________________________________________    Dx:  Initial bmi isBody mass index is 68.41 kg/m .  With the following weight related comorbid medical conditions: Hypertension, CARMENZA, Asthma, hyperlipidemia, pulmonary hypertension, hx of CVA, hx of CHF,     BECAUSE OF ABOVE PROBLEMS IT IS STRONGLY ADVISED THAT Marlo LOSE WEIGHT.  BELOW IS MY PLAN FOR her     WalkerKia PA-C  is her primary care provider - who referred her here today for help with weight loss.    Start weight 375 lbs, Body mass index is 68.41 kg/m .  11/13/2020     Medication notes:   Medications she takes, that are known to increase weight: zyprexa, metoprolol, effexor, abilify, robaxin,   Medications she takes, known to decrease weight: none, but starting liraglutide  Medications she takes whose dose may be affected by weight changes: lamictal, keppra, bumex, metoprolol, psych meds    wellbutrin and phentermine contraindicated due to history of seizure disorder.     saxenda - she wants to try,     but if not covered would like to try naltrexone.       Potential barriers to weight loss identified thus far:   -does not drink water  -she has been to the food shelf this year.   -craves sweets, candy, chocolate  -Sleep apnea  -Chronic pain (knee/ back)  potentially reducing ability to be active-physical therapy could be offered  - fatigue making it difficult to be active  -genetic component  - eats wrong types of foods  -sedentary lifestyle  -snacks between meals daily  -eats starches daily  -eats while watching TV daily      Strengths that may help weight loss:  -likes veggies  -lives with her dad and her boyfriend who are supportive of her health goals.

## 2020-11-06 NOTE — ASSESSMENT & PLAN NOTE
Weight reduction recommended.  Cannot recall if we discussed importance of wearing cpap today to help weight loss, will need to makesure and cover this at follow up.

## 2020-11-11 ENCOUNTER — OFFICE VISIT - HEALTHEAST (OUTPATIENT)
Dept: FAMILY MEDICINE | Facility: CLINIC | Age: 40
End: 2020-11-11

## 2020-11-11 ENCOUNTER — OFFICE VISIT - HEALTHEAST (OUTPATIENT)
Dept: PHARMACY | Facility: CLINIC | Age: 40
End: 2020-11-11

## 2020-11-11 ENCOUNTER — COMMUNICATION - HEALTHEAST (OUTPATIENT)
Dept: NURSING | Facility: CLINIC | Age: 40
End: 2020-11-11

## 2020-11-11 DIAGNOSIS — E66.813 CLASS 3 OBESITY WITH ALVEOLAR HYPOVENTILATION, SERIOUS COMORBIDITY, AND BODY MASS INDEX (BMI) OF 60.0 TO 69.9 IN ADULT (H): ICD-10-CM

## 2020-11-11 DIAGNOSIS — I50.33 ACUTE ON CHRONIC DIASTOLIC CHF (CONGESTIVE HEART FAILURE) (H): ICD-10-CM

## 2020-11-11 DIAGNOSIS — E66.01 MORBID OBESITY WITH BMI OF 60.0-69.9, ADULT (H): ICD-10-CM

## 2020-11-11 DIAGNOSIS — E66.2 CLASS 3 OBESITY WITH ALVEOLAR HYPOVENTILATION, SERIOUS COMORBIDITY, AND BODY MASS INDEX (BMI) OF 60.0 TO 69.9 IN ADULT (H): ICD-10-CM

## 2020-11-11 DIAGNOSIS — Z71.3 NUTRITIONAL COUNSELING: ICD-10-CM

## 2020-11-11 DIAGNOSIS — E78.5 HYPERLIPIDEMIA, UNSPECIFIED HYPERLIPIDEMIA TYPE: ICD-10-CM

## 2020-11-11 RX ORDER — LIRAGLUTIDE 6 MG/ML
0.6 INJECTION SUBCUTANEOUS DAILY
Refills: 11 | Status: SHIPPED | OUTPATIENT
Start: 2020-11-11 | End: 2021-12-10

## 2020-11-11 RX ORDER — LIRAGLUTIDE 6 MG/ML
INJECTION SUBCUTANEOUS
Qty: 9 ML | Status: SHIPPED | OUTPATIENT
Start: 2020-11-11 | End: 2021-12-10

## 2020-11-11 RX ORDER — METHOCARBAMOL 750 MG/1
1 TABLET, FILM COATED ORAL 2 TIMES DAILY
Status: SHIPPED | COMMUNITY
Start: 2020-11-06

## 2020-11-11 RX ORDER — LIRAGLUTIDE 6 MG/ML
1.2 INJECTION SUBCUTANEOUS DAILY
Refills: 11 | Status: SHIPPED | OUTPATIENT
Start: 2020-11-18 | End: 2021-12-10

## 2020-11-11 RX ORDER — LIRAGLUTIDE 6 MG/ML
1.8 INJECTION SUBCUTANEOUS DAILY
Refills: 11 | Status: SHIPPED | OUTPATIENT
Start: 2020-11-25 | End: 2021-12-10

## 2020-11-16 ENCOUNTER — COMMUNICATION - HEALTHEAST (OUTPATIENT)
Dept: NURSING | Facility: CLINIC | Age: 40
End: 2020-11-16

## 2020-11-16 ENCOUNTER — OFFICE VISIT - HEALTHEAST (OUTPATIENT)
Dept: PHARMACY | Facility: CLINIC | Age: 40
End: 2020-11-16

## 2020-11-16 DIAGNOSIS — E66.01 MORBID OBESITY WITH BMI OF 60.0-69.9, ADULT (H): ICD-10-CM

## 2020-11-30 ENCOUNTER — OFFICE VISIT - HEALTHEAST (OUTPATIENT)
Dept: PHARMACY | Facility: CLINIC | Age: 40
End: 2020-11-30

## 2020-11-30 DIAGNOSIS — E66.01 MORBID OBESITY WITH BMI OF 60.0-69.9, ADULT (H): ICD-10-CM

## 2020-11-30 PROCEDURE — 99605 MTMS BY PHARM NP 15 MIN: CPT | Performed by: PHARMACIST

## 2020-12-08 ENCOUNTER — OFFICE VISIT - HEALTHEAST (OUTPATIENT)
Dept: FAMILY MEDICINE | Facility: CLINIC | Age: 40
End: 2020-12-08

## 2020-12-08 DIAGNOSIS — Z71.3 NUTRITIONAL COUNSELING: ICD-10-CM

## 2020-12-08 DIAGNOSIS — E11.9 TYPE 2 DIABETES MELLITUS WITHOUT COMPLICATION, WITHOUT LONG-TERM CURRENT USE OF INSULIN (H): ICD-10-CM

## 2020-12-08 DIAGNOSIS — E66.2 CLASS 3 OBESITY WITH ALVEOLAR HYPOVENTILATION, SERIOUS COMORBIDITY, AND BODY MASS INDEX (BMI) OF 60.0 TO 69.9 IN ADULT (H): ICD-10-CM

## 2020-12-08 DIAGNOSIS — E78.5 HYPERLIPIDEMIA, UNSPECIFIED HYPERLIPIDEMIA TYPE: ICD-10-CM

## 2020-12-08 DIAGNOSIS — E66.813 CLASS 3 OBESITY WITH ALVEOLAR HYPOVENTILATION, SERIOUS COMORBIDITY, AND BODY MASS INDEX (BMI) OF 60.0 TO 69.9 IN ADULT (H): ICD-10-CM

## 2020-12-08 DIAGNOSIS — I50.33 ACUTE ON CHRONIC DIASTOLIC CHF (CONGESTIVE HEART FAILURE) (H): ICD-10-CM

## 2021-01-05 ENCOUNTER — OFFICE VISIT - HEALTHEAST (OUTPATIENT)
Dept: FAMILY MEDICINE | Facility: CLINIC | Age: 41
End: 2021-01-05

## 2021-01-05 DIAGNOSIS — E78.5 HYPERLIPIDEMIA, UNSPECIFIED HYPERLIPIDEMIA TYPE: ICD-10-CM

## 2021-01-05 DIAGNOSIS — E66.813 CLASS 3 OBESITY WITH ALVEOLAR HYPOVENTILATION, SERIOUS COMORBIDITY, AND BODY MASS INDEX (BMI) OF 60.0 TO 69.9 IN ADULT (H): ICD-10-CM

## 2021-01-05 DIAGNOSIS — Z71.3 NUTRITIONAL COUNSELING: ICD-10-CM

## 2021-01-05 DIAGNOSIS — I50.33 ACUTE ON CHRONIC DIASTOLIC CHF (CONGESTIVE HEART FAILURE) (H): ICD-10-CM

## 2021-01-05 DIAGNOSIS — E66.2 CLASS 3 OBESITY WITH ALVEOLAR HYPOVENTILATION, SERIOUS COMORBIDITY, AND BODY MASS INDEX (BMI) OF 60.0 TO 69.9 IN ADULT (H): ICD-10-CM

## 2021-01-05 DIAGNOSIS — E11.9 TYPE 2 DIABETES MELLITUS WITHOUT COMPLICATION, WITHOUT LONG-TERM CURRENT USE OF INSULIN (H): ICD-10-CM

## 2021-01-26 ENCOUNTER — OFFICE VISIT - HEALTHEAST (OUTPATIENT)
Dept: FAMILY MEDICINE | Facility: CLINIC | Age: 41
End: 2021-01-26

## 2021-01-26 DIAGNOSIS — E66.2 CLASS 3 OBESITY WITH ALVEOLAR HYPOVENTILATION, SERIOUS COMORBIDITY, AND BODY MASS INDEX (BMI) OF 60.0 TO 69.9 IN ADULT (H): ICD-10-CM

## 2021-01-26 DIAGNOSIS — Z71.3 NUTRITIONAL COUNSELING: ICD-10-CM

## 2021-01-26 DIAGNOSIS — E78.5 HYPERLIPIDEMIA, UNSPECIFIED HYPERLIPIDEMIA TYPE: ICD-10-CM

## 2021-01-26 DIAGNOSIS — E66.813 CLASS 3 OBESITY WITH ALVEOLAR HYPOVENTILATION, SERIOUS COMORBIDITY, AND BODY MASS INDEX (BMI) OF 60.0 TO 69.9 IN ADULT (H): ICD-10-CM

## 2021-01-26 DIAGNOSIS — E11.9 TYPE 2 DIABETES MELLITUS WITHOUT COMPLICATION, WITHOUT LONG-TERM CURRENT USE OF INSULIN (H): ICD-10-CM

## 2021-01-26 DIAGNOSIS — I50.33 ACUTE ON CHRONIC DIASTOLIC CHF (CONGESTIVE HEART FAILURE) (H): ICD-10-CM

## 2021-02-08 ENCOUNTER — RECORDS - HEALTHEAST (OUTPATIENT)
Dept: LAB | Facility: CLINIC | Age: 41
End: 2021-02-08

## 2021-02-08 LAB
ALBUMIN SERPL-MCNC: 3.4 G/DL (ref 3.5–5)
ALP SERPL-CCNC: 67 U/L (ref 45–120)
ALT SERPL W P-5'-P-CCNC: 10 U/L (ref 0–45)
ANION GAP SERPL CALCULATED.3IONS-SCNC: 11 MMOL/L (ref 5–18)
AST SERPL W P-5'-P-CCNC: 12 U/L (ref 0–40)
BILIRUB SERPL-MCNC: 0.5 MG/DL (ref 0–1)
BUN SERPL-MCNC: 8 MG/DL (ref 8–22)
CALCIUM SERPL-MCNC: 8.5 MG/DL (ref 8.5–10.5)
CHLORIDE BLD-SCNC: 99 MMOL/L (ref 98–107)
CO2 SERPL-SCNC: 28 MMOL/L (ref 22–31)
CREAT SERPL-MCNC: 0.6 MG/DL (ref 0.6–1.1)
GFR SERPL CREATININE-BSD FRML MDRD: >60 ML/MIN/1.73M2
GLUCOSE BLD-MCNC: 100 MG/DL (ref 70–125)
POTASSIUM BLD-SCNC: 4.1 MMOL/L (ref 3.5–5)
PROT SERPL-MCNC: 6.6 G/DL (ref 6–8)
SODIUM SERPL-SCNC: 138 MMOL/L (ref 136–145)

## 2021-02-24 ENCOUNTER — OFFICE VISIT - HEALTHEAST (OUTPATIENT)
Dept: FAMILY MEDICINE | Facility: CLINIC | Age: 41
End: 2021-02-24

## 2021-02-24 DIAGNOSIS — E78.5 HYPERLIPIDEMIA, UNSPECIFIED HYPERLIPIDEMIA TYPE: ICD-10-CM

## 2021-02-24 DIAGNOSIS — Z71.3 NUTRITIONAL COUNSELING: ICD-10-CM

## 2021-02-24 DIAGNOSIS — E11.9 TYPE 2 DIABETES MELLITUS WITHOUT COMPLICATION, WITHOUT LONG-TERM CURRENT USE OF INSULIN (H): ICD-10-CM

## 2021-02-24 DIAGNOSIS — E66.2 CLASS 3 OBESITY WITH ALVEOLAR HYPOVENTILATION, SERIOUS COMORBIDITY, AND BODY MASS INDEX (BMI) OF 60.0 TO 69.9 IN ADULT (H): ICD-10-CM

## 2021-02-24 DIAGNOSIS — I50.33 ACUTE ON CHRONIC DIASTOLIC CHF (CONGESTIVE HEART FAILURE) (H): ICD-10-CM

## 2021-02-24 DIAGNOSIS — E66.813 CLASS 3 OBESITY WITH ALVEOLAR HYPOVENTILATION, SERIOUS COMORBIDITY, AND BODY MASS INDEX (BMI) OF 60.0 TO 69.9 IN ADULT (H): ICD-10-CM

## 2021-03-24 ENCOUNTER — OFFICE VISIT - HEALTHEAST (OUTPATIENT)
Dept: FAMILY MEDICINE | Facility: CLINIC | Age: 41
End: 2021-03-24

## 2021-03-24 DIAGNOSIS — E66.813 CLASS 3 OBESITY WITH ALVEOLAR HYPOVENTILATION, SERIOUS COMORBIDITY, AND BODY MASS INDEX (BMI) OF 60.0 TO 69.9 IN ADULT (H): ICD-10-CM

## 2021-03-24 DIAGNOSIS — E11.9 TYPE 2 DIABETES MELLITUS WITHOUT COMPLICATION, WITHOUT LONG-TERM CURRENT USE OF INSULIN (H): ICD-10-CM

## 2021-03-24 DIAGNOSIS — E66.2 CLASS 3 OBESITY WITH ALVEOLAR HYPOVENTILATION, SERIOUS COMORBIDITY, AND BODY MASS INDEX (BMI) OF 60.0 TO 69.9 IN ADULT (H): ICD-10-CM

## 2021-03-24 DIAGNOSIS — E78.5 HYPERLIPIDEMIA, UNSPECIFIED HYPERLIPIDEMIA TYPE: ICD-10-CM

## 2021-03-24 DIAGNOSIS — Z71.3 NUTRITIONAL COUNSELING: ICD-10-CM

## 2021-03-24 DIAGNOSIS — I50.33 ACUTE ON CHRONIC DIASTOLIC CHF (CONGESTIVE HEART FAILURE) (H): ICD-10-CM

## 2021-04-13 ENCOUNTER — RECORDS - HEALTHEAST (OUTPATIENT)
Dept: ADMINISTRATIVE | Facility: OTHER | Age: 41
End: 2021-04-13

## 2021-04-19 ENCOUNTER — HOSPITAL ENCOUNTER (OUTPATIENT)
Dept: ULTRASOUND IMAGING | Facility: HOSPITAL | Age: 41
Discharge: HOME | End: 2021-04-19

## 2021-04-19 DIAGNOSIS — I82.4Z1 DEEP VEIN THROMBOSIS (DVT) OF DISTAL VEIN OF RIGHT LOWER EXTREMITY, UNSPECIFIED CHRONICITY (H): ICD-10-CM

## 2021-05-12 ENCOUNTER — OFFICE VISIT - HEALTHEAST (OUTPATIENT)
Dept: FAMILY MEDICINE | Facility: CLINIC | Age: 41
End: 2021-05-12

## 2021-05-12 DIAGNOSIS — E66.2 CLASS 3 OBESITY WITH ALVEOLAR HYPOVENTILATION, SERIOUS COMORBIDITY, AND BODY MASS INDEX (BMI) OF 60.0 TO 69.9 IN ADULT (H): ICD-10-CM

## 2021-05-12 DIAGNOSIS — E78.5 HYPERLIPIDEMIA, UNSPECIFIED HYPERLIPIDEMIA TYPE: ICD-10-CM

## 2021-05-12 DIAGNOSIS — E11.9 TYPE 2 DIABETES MELLITUS WITHOUT COMPLICATION, WITHOUT LONG-TERM CURRENT USE OF INSULIN (H): ICD-10-CM

## 2021-05-12 DIAGNOSIS — Z71.3 NUTRITIONAL COUNSELING: ICD-10-CM

## 2021-05-12 DIAGNOSIS — E66.813 CLASS 3 OBESITY WITH ALVEOLAR HYPOVENTILATION, SERIOUS COMORBIDITY, AND BODY MASS INDEX (BMI) OF 60.0 TO 69.9 IN ADULT (H): ICD-10-CM

## 2021-05-12 DIAGNOSIS — I50.33 ACUTE ON CHRONIC DIASTOLIC CHF (CONGESTIVE HEART FAILURE) (H): ICD-10-CM

## 2021-05-31 VITALS — BODY MASS INDEX: 53.92 KG/M2 | HEIGHT: 62 IN | WEIGHT: 293 LBS

## 2021-06-02 ENCOUNTER — OFFICE VISIT - HEALTHEAST (OUTPATIENT)
Dept: FAMILY MEDICINE | Facility: CLINIC | Age: 41
End: 2021-06-02

## 2021-06-02 VITALS — WEIGHT: 293 LBS | HEIGHT: 62 IN | BODY MASS INDEX: 53.92 KG/M2

## 2021-06-02 DIAGNOSIS — E66.813 CLASS 3 OBESITY WITH ALVEOLAR HYPOVENTILATION, SERIOUS COMORBIDITY, AND BODY MASS INDEX (BMI) OF 60.0 TO 69.9 IN ADULT (H): ICD-10-CM

## 2021-06-02 DIAGNOSIS — E66.2 CLASS 3 OBESITY WITH ALVEOLAR HYPOVENTILATION, SERIOUS COMORBIDITY, AND BODY MASS INDEX (BMI) OF 60.0 TO 69.9 IN ADULT (H): ICD-10-CM

## 2021-06-02 DIAGNOSIS — Z71.3 NUTRITIONAL COUNSELING: ICD-10-CM

## 2021-06-02 DIAGNOSIS — E78.5 HYPERLIPIDEMIA, UNSPECIFIED HYPERLIPIDEMIA TYPE: ICD-10-CM

## 2021-06-02 DIAGNOSIS — I50.33 ACUTE ON CHRONIC DIASTOLIC CHF (CONGESTIVE HEART FAILURE) (H): ICD-10-CM

## 2021-06-03 ENCOUNTER — RECORDS - HEALTHEAST (OUTPATIENT)
Dept: LAB | Facility: CLINIC | Age: 41
End: 2021-06-03

## 2021-06-04 LAB
SARS-COV-2 PCR COMMENT: NORMAL
SARS-COV-2 RNA SPEC QL NAA+PROBE: NEGATIVE
SARS-COV-2 VIRUS SPECIMEN SOURCE: NORMAL

## 2021-06-05 VITALS — HEIGHT: 62 IN | WEIGHT: 293 LBS | BODY MASS INDEX: 53.92 KG/M2

## 2021-06-05 VITALS
HEIGHT: 62 IN | TEMPERATURE: 98 F | HEART RATE: 60 BPM | WEIGHT: 293 LBS | RESPIRATION RATE: 16 BRPM | BODY MASS INDEX: 53.92 KG/M2

## 2021-06-05 LAB — BACTERIA SPEC CULT: ABNORMAL

## 2021-06-12 NOTE — TELEPHONE ENCOUNTER
Left VM stating the arterial studies were fine.  Should continue plan as outlined at visit, and to call the clinic with any further questions at [phone]

## 2021-06-12 NOTE — PROGRESS NOTES
.  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  Patient getting reestablished here, last visit 5/2015.  Bipolar affective disorder; per history.  Chemical dependency issues; currently in remission.    Plan:     We will have our nursing staff verify with her current pharmacy regarding her medications and update the list.  I let patient know we would not fill her prescriptions until we correct information and verified she was on psychotropics.  Patient may use Zyprexa 2.5 as needed as needed for anxiety, that is currently on her list.  Sleep study ordered; rule out obstructive sleep apnea.  Recommended patient get reestablished with therapist, we did give her the number for [facility] where she had done before.  Recommended patient get reestablished with her AA meetings.  We will have patient follow-up in 1 month.  If we do order medications, she will have a one-time fill and needs to follow up before further refills.    Subjective:      HPI: Marlo Kearns is a 36 y.o. female with cognitive and mood changes secondary to a CVA.  She is here to to get reestablished with care, her last visit was 5/2015.  Briefly, she had a history of polysubstance abuse including methamphetamine, K that being abuse and presented to [facility] with a headache.  She was diagnosed with a large hemorrhage and went into respiratory failure and required intubation.  Her course was complicated with confusion and agitation, requiring medications and supervision.  She was transferred to [place] on April 10 of 2015 for continued rehab.  Patient had significant cognitive deficits and right hemiparesis and was placed on phenobarbital for behaviors.  This was tapered and patient was followed by Dr. Roberts.  She received PT, OT and speech and was eventually discharged to a TCU unit before returning to her father's house under his care.  Her last visit, she was having trouble sleeping and we  "started Remeron.  She did not follow-up after that date.    Patient is here to get reestablished with care.  She states she has been without her psychotropic medicines for the last 2 months.  Patient was in [place] early this year for CD issues and I Dr. Banks started her on Latuda and \"another pill that starts with P for nightmares\".  Patient states her family is concerned that she is \"becoming crazy\" and needs to go back on her medications.  She and her family feel that she is not a danger to herself or others.  Currently she lives in a house with her father, uncle, and fiancé.  She manages her own medications and cares, but gets her medications set up once a month through [facility].  She receives SSDI and has a worker for food stamps.  In the past she did have an [employer] worker but did not feel they were helpful.  Patient does have a  she sees once a month, they recommended she get back to AA meetings but patient states that causes a lot of anxiety.  She does plan to attend a meeting and perhaps take her uncle with her.  She is no longer getting individual therapy through [facility] and [facility] as she was having breakthrough seizures and they wanted her to get under control before continuing therapy.  [facility]18/17.  Patient is currently on Lamictal and Keppra.  States she has been seizure-free.    Current symptoms: Patient endorses feelings of depression, \"negative thoughts and giving up\".  She at times will have suicidal thoughts, no plan and she would not act on it.  Patient states as a kid she would \"cut her wrists\" she has not done any that behavior in the last year per her report.  Does report manic behavior, this usually involves staying up and not fallen asleep at night.  She will pace the room and have racing thoughts.  She denies any auditory or visual hallucinations but has had that in the past.  She feels she " "sleeps \"too much\".  Patient states she is trying to get back on a regular sleep cycle, she thinks she may have sleep apnea as she snores and does stop breathing at times.  She describes her energy level is \"low\".  She is trying to eat better and exercise to lose weight using weights at this time.  Continues to have right-sided numbness from her stroke.  No neck or back pain but she does have headaches \"all the time\".  She states she did let her neurologist know that.  She feels her memory is \"good\".  No trouble with short-term memory, attention and concentration.  She does have trouble reading per her report, she has a AA book that her father will read to her.  She feels she just needs more practice reading.            Past Medical History:   Diagnosis Date     Acute renal failure      MEGHA (acute kidney injury)      Aphasia      AV malformation, acquired      AVM (arteriovenous malformation) brain     left, ruptured     Bipolar affective      Borderline personality disorder      Cerebral edema      Elevated glucose      Encephalopathy      Encephalopathy      Headache      Hemiparesis      Hypertension      Intraventricular hemorrhage      Morbid obesity      Pneumonia      Polysubstance abuse      Polysubstance abuse      PTSD (post-traumatic stress disorder)      Renal failure      Stroke      Suicidal ideation      UTI (urinary tract infection)      Past Surgical History:   Procedure Laterality Date     kidney stone removal       OVARIAN CYST REMOVAL       TONSILLECTOMY       Fish containing products; Ancef [cefazolin]; Keflex [cephalexin]; and Sulfa (sulfonamide antibiotics)  Current Outpatient Prescriptions   Medication Sig Dispense Refill     amLODIPine (NORVASC) 10 MG tablet Take 10 mg by mouth.       famotidine (PEPCID) 20 MG tablet Take 20 mg by mouth.       folic acid (FOLVITE) 1 MG tablet Take 1 mg by mouth.       acetaminophen (TYLENOL) 500 MG tablet Take 500 mg by mouth 2 times a day at 6:00 am and 4:00 " "pm.       menthol-zinc oxide (RISAMINE) 0.44-20.6 % Oint ointment Rash as needed       OLANZapine (ZYPREXA) 2.5 MG tablet Take 1 tablet (2.5 mg total) by mouth 3 (three) times a day as needed (agitation). 90 tablet 3     polyethylene glycol (MIRALAX) 17 gram packet Take 17 g by mouth daily.       No current facility-administered medications for this encounter.      No family history on file.  Social History     Social History     Marital status: Single     Spouse name: N/A     Number of children: N/A     Years of education: N/A     Social History Main Topics     Smoking status: Former Smoker     Smokeless tobacco: Not on file     Alcohol use Yes     Drug use: Yes     Special: Methamphetamines, Cocaine     Sexual activity: Not Currently     Other Topics Concern     Not on file     Social History Narrative     No narrative on file     Patient Active Problem List   Diagnosis     ICH (intracerebral hemorrhage)     Cerebral artery occlusion with cerebral infarction     Acute renal failure     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     Borderline personality disorder     Encephalopathy     Essential hypertension     Intracerebral hemorrhage     Nondependent amphetamine or related acting sympathomimetic abuse     Hemiplegia     Need for prophylactic measure     Physical deconditioning     Malnutrition     TBI (traumatic brain injury)     Unspecified episodic mood disorder       Review of Systems  Constitutional: negative  Musculoskeletal:negative for back pain and neck pain  Neurological: positive for headaches and paresthesia, negative for memory problems, speech problems and weakness  Behavioral/Psych: positive for anxiety, depression, mood swings, sleep disturbance and manic behavior, negative for excessive alcohol consumption, illegal drug usage, loss of interest in favorite activities and hallucinations       Objective:     Vitals:    08/30/17 1107   Weight: (!) 359 lb 8 oz (163.1 kg)   Height: 5' 2\" " (1.575 m)       Physical Exam: General appearance: alert, appears stated age, cooperative, no distress and morbidly obese  Neurologic: Patient is casually dressed and arrives to the appointment on time accompanied by self. Patient exhibits psychomotor agitation with lacks posture. Speech is clear and fluent with no obvious paraphasic errors. Patient is alert and oriented is 5. Recent and remote memory appear grossly intact. General fund of knowledge is good. Affect and mood are mood congruent, she smiles and laughs appropriately. Thought content devoid of any delusions, suicidal, homicidal or obsesional content. Thought process logical and coherent. Patient's understanding of the purpose of today's visit good. Judgement, insight, and impulse control do not appear to be impaired.  Gait is broad-based but steady.  No involuntary movements.

## 2021-06-12 NOTE — TELEPHONE ENCOUNTER
Patient was on the schedule this afternoon for a weight loss intake with .   She does not have [employer].   I called patient and told her she needs to set up her [employer] account, and then needs to complete a questionnaire that I will send her.   She requested that we move her appt to a different day so she has time to complete this.   Moved her visit to Friday.     Jeanne Fernandez CMA 11/4/2020 8:53 AM   Kirill RICO
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
## 2021-06-12 NOTE — PROGRESS NOTES
Patient's impression of how medication is working? Good, pt is getting a lot of headaches.    Compliant with Medication? yes    Side Effects: None    Current Symptoms : Yes    Pain (0-10) No  Appetite change No  Negative thoughts No  Alcohol use No  Drug use No  Mood swings No  Sleep disturbance Yes  Change in interest No  Change in energy Yes  Change in concentration No  Psychosis/Hallucinations No  Anxiety moderate  Sad/depressed mood moderate

## 2021-06-13 NOTE — PROGRESS NOTES
MTM Consult Encounter    Marlo Kearns is a 40 y.o. female referred for a clinical pharmacist consult from Dr. Naqvi for weight loss.    Discussion: As discussed last week, she will be starting liraglutide for weight loss.  Due to insurance medications, this is being prescribed under the brand and Victoza.  Video visit today was conducted in order to teach her how to utilize the device.  She was able to administer her first dose of Victoza to herself during the video visit.  All questions were answered.  Reviewed potential adverse effects.  She would feel most comfortable if we did follow-up again in a few weeks, this was scheduled.    Plan:  1.  Educated on how to administer liraglutide    Follow up:   2 weeks by video visit    Jamison Baez, PharmD, BCACP  Medication Management (MTM) Pharmacist  [facility]    Current Outpatient Medications   Medication Sig Dispense Refill     acetaminophen (TYLENOL) 500 MG tablet Take 500 mg by mouth every 6 (six) hours as needed.        albuterol (PROVENTIL) 2.5 mg /3 mL (0.083 %) nebulizer solution Take 3 mL (2.5 mg total) by nebulization every 4 (four) hours as needed for wheezing.  0     amLODIPine (NORVASC) 10 MG tablet Take 1 tablet (10 mg total) by mouth every morning. Hold for SBP < 110  0     ARIPiprazole (ABILIFY) 5 MG tablet TAKE 1 TABLET (5 MG TOTAL) BY MOUTH DAILY. 30 tablet 4     bisacodyl (DULCOLAX) 10 mg suppository Daily as needed for constipation  0     bisoprolol (ZEBETA) 5 MG tablet Take 2.5 mg by mouth daily.       bumetanide (BUMEX) 2 MG tablet Take 2 mg by mouth 2 (two) times a day.       collagenase ointment Apply 1 application topically daily. To right foot       diclofenac sodium (VOLTAREN) 1 % Gel Apply topically 2 (two) times a day.       famotidine (PEPCID) 20 MG tablet Take 20 mg by mouth 2 (two) times a day.        fish oil-omega-3 fatty acids 300-1,000 mg capsule Take 1 capsule by mouth 2 (two) times a  day.       fluticasone propion-salmeteroL (ADVAIR) 100-50 mcg/dose DISKUS Inhale 1 puff every 12 (twelve) hours.       folic acid (FOLVITE) 1 MG tablet Take 1 tablet (1 mg total) by mouth daily.  0     gabapentin (NEURONTIN) 300 MG capsule Take 300 mg by mouth 2 (two) times a day. 300 mg in the morning an din the afternoon       gabapentin (NEURONTIN) 300 MG capsule Take 600 mg by mouth at bedtime.       ipratropium-albuteroL (DUO-NEB) 0.5-2.5 mg/3 mL nebulizer Inhale every 4 (four) hours.       Rusty Therapuetic Nutrition (RUSTY) 7-7-1.5 gram PwPk Take 1 packet by mouth 2 (two) times a day.  0     lamoTRIgine (LAMICTAL) 150 MG tablet Take 150 mg by mouth 2 (two) times a day.       levETIRAcetam (KEPPRA) 500 MG tablet Take 750 mg by mouth 2 (two) times a day.        liraglutide (VICTOZA 3-AMBIKA) 0.6 mg/0.1 mL (18 mg/3 mL) injection Inject 0.1 mL (0.6 mg total) under the skin daily for 7 days, THEN 0.2 mL (1.2 mg total) daily for 7 days, THEN 0.3 mL (1.8 mg total) daily. 9 mL 11     meclizine (ANTIVERT) 25 mg tablet Take 25 mg by mouth 3 (three) times a day as needed for dizziness.       methocarbamoL (ROBAXIN) 750 MG tablet Take 1 tablet by mouth 2 (two) times a day.       metoprolol tartrate (LOPRESSOR) 25 MG tablet Take 0.5 tablets (12.5 mg total) by mouth 2 (two) times a day. Hold for SBP < 100, HR < 60  0     miconazole nitrate (CRITIC-AID AF) 2 % Oint ointment Apply to perianal rash, anterior thigh  0     miscellaneous medical supply [facility] New CPAP machine for home use at pressure: 10-20 cmw , Heated humidifier x 1 q 5yr, water chamber x 1 q 6 mo,  chin strap x 1 q 6 mo,  Full Face Mask x 1 q 3mo, Full face cushion x 1 q mo, Heated PAP tubing x 1 q 3 mo, Headgear x 1 q 6 mo, non-disposable filters x 1 q 6 mo, disposable filter x 2 q mo; Length of Need: 99 months; Frequency of use: Daily       multivitamin (ONE A DAY) per tablet Take 1 tablet by mouth daily.       multivitamin with minerals (THERA-M) 9 mg iron-400  "mcg Tab tablet Take 1 tablet by mouth daily.  0     nicotine (NICODERM CQ) 14 mg/24 hr Place 1 patch on the skin daily.       OLANZapine (ZYPREXA) 2.5 MG tablet TAKE 1 TABLET BY MOUTH THREE TIMES A DAY AS NEEDED FOR AGITATION 90 tablet 4     pen needle, diabetic (BD ULTRA-FINE HENNY PEN NEEDLE) 32 gauge x 5/32\" Ndle USE WITH LIRAGULATIDE 100 each 4     potassium chloride (K-DUR,KLOR-CON) 20 MEQ tablet Take 20 mEq by mouth 2 (two) times a day with meals.       spironolactone (ALDACTONE) 25 MG tablet Take 25 mg by mouth daily before breakfast.       torsemide (DEMADEX) 20 MG tablet Take 40 mg by mouth 2 (two) times a day at 9am and 6pm.       venlafaxine (EFFEXOR-XR) 37.5 MG 24 hr capsule TAKE 1 CAPSULE (37.5 MG TOTAL) BY MOUTH DAILY. 30 capsule 4     white petrolatum (AQUAPHOR NATURAL HEALING) 41 % Oint To dry skin  0     No current facility-administered medications for this visit.                         "

## 2021-06-13 NOTE — PROGRESS NOTES
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  Patient had a prior history of polysubstance abuse including methamphetamine and also had a possible bipolar diagnosis.  The patient suffered a large hemorrhagic stroke and was treated in the [facility] system but was transferred to [place] April 10, 2015 for ongoing rehabilitation.  She has been left with cognitive deficits and some right hemiparesis.  She was discharged from [place] to a TCU before returning to her father's house.  We did try some Remeron for some sleep issues but she did not follow up at that time.  She did reestablished contact with the nurse practitioner recently.  The patient also had a prior history of bipolar affective disorder as well as chemical dependency issues.  She was seen by the nurse practitioner in August 2017.  The patient was on as needed Zyprexa at that time.  She was sent for sleep study.  He tried to reestablish her with the therapist.  She was recommended to reestablish her AA meetings.    She presents today stating she is on no psychotropic medication.  She states she went to [facility] a year ago and was given 2 months worth of medication and never followed up.  She states that she has been off medication for almost a year from a psychiatric standpoint.  She continues on her seizure medication and is seeing a neurologist.  She apparently was seen by Dr. Banks there but did not follow up with him and she cannot recall what medication she was on.  She is unsure whether it was helpful or not.  She is endorsing feeling depressed and hopeless and worthless.  She has fleeting thoughts of wishing she was dead but no plan to harm herself and she is able to contract for safety.  She denies psychosis.  She is a very vague historian but describes oversleeping typically but then will go days without sleeping.  This is a fairly typical  "pattern for her.  She describes her mood as mostly depressed but she has distinct periods where she becomes more active and more irritable.  She denies having any significant change in cognition which is chronically impaired due to the traumatic brain injury.  She denies having any other medical concerns or complaints.    Patient admits that she did not follow up with her AA meetings or her individual therapist as we recommended at her last visit here with the nurse practitioner.  I reinforced that again and she stated she would follow up as directed.  I did talk with her about the risks and benefits of the medication and treatment options and we have elected to start some medication for her symptoms as described below.      Current Medications: Please see chart. Medications personally reviewed.    Medication Compliance: No    Side Effects to Medications: None apparent      Vitals:  Wt Readings from Last 3 Encounters:   08/30/17 (!) 359 lb 8 oz (163.1 kg)   05/08/15 (!) 283 lb 6.4 oz (128.5 kg)   05/06/15 (!) 229 lb (103.9 kg)     Temp Readings from Last 3 Encounters:   05/11/15 97.8  F (36.6  C) (Oral)     BP Readings from Last 3 Encounters:   01/11/17 150/82   05/11/15 123/63     Pulse Readings from Last 3 Encounters:   01/11/17 70   05/11/15 64   05/06/15 64         Mental Status Exam:   Patient was slow flat with limited eye contact.  No obvious pain or shortness of breath.  She was distractible and somewhat disorganized.  No obvious racing thoughts.  Mood was depressed and affect was flat.  Speech was slow monotone and vague.  Fairly concrete with limited initiation.  She answered \"I do not know\" to many questions posed.  Thought content does not show any hallucinations or delusions.  No suicidal or homicidal ideation.  Thought formation does not show the patient to be loose.  Insight, judgment and memory are all impaired but she reports recently unchanged.  Fund of knowledge is limited    Diagnosis managed and " treated at today's visit :    Cognitive disorder and mood disorder secondary to CVA  Bipolar affect disorder by history  History of polysubstance dependence.  Apparently recently inactive but the patient is extremely vague      Plan:  Medication Adjustment:  At this time I am going to start the patient on venlafaxine X art 37.5 mg a day.  I have also started the patient on Abilify 5 mg a day.  Risks and benefits were discussed including the risk of tomas or worsening mood.  She agrees to call or seek out emergency services if necessary    Other:   Patient will return in 6 weeks to this clinic for med check.  She agrees to call before then with any questions or concerns.    Continue with the support of the clinic, reassurance, and redirection. Staff monitoring and ongoing assessments per team plan. Current psychotropic medication appears to represent the minimum effective dosage and appears medically necessary. We will continue to monitor and reassess. This team will utilize appropriate emergency services if necessary. I will make myself available if concerns or problems arise.    Taurus Roberts

## 2021-06-13 NOTE — PROGRESS NOTES
"MTM Consult Encounter    Marlo Kearns is a 40 y.o. female referred for a clinical pharmacist consult from Dr. Naqvi for weight loss.      Discussion: She described the nursing home yesterday and is still going through her paperwork.  She was not able to get Saxenda from her insurance because it is too expensive.  She would like to try the alternative that Dr. Naqvi had discussed, called Victoza.  Reviewed her medical history which does not include any contraindications for GLP-1 agonist.  Provided education on mechanism of action of Victoza, potential benefits versus risks, and will discuss tomorrow via video chat how to administer correctly.  She does not drink, continues to smoke about 1/2 pack/day, and is no longer using methamphetamines, she has been sober for about 6 years.    Plan:  1.  Due to insurance formulary, educate to consider Victoza to replace Saxenda    Follow up:   1 day for victoza teaching     Jamison Baez, PharmD, BCACP  Medication Management (MTM) Pharmacist  [facility]    Current Outpatient Medications   Medication Sig Dispense Refill     liraglutide (VICTOZA 3-AMBIKA) 0.6 mg/0.1 mL (18 mg/3 mL) injection Inject 0.1 mL (0.6 mg total) under the skin daily for 7 days, THEN 0.2 mL (1.2 mg total) daily for 7 days, THEN 0.3 mL (1.8 mg total) daily. 9 mL 11     pen needle, diabetic (BD ULTRA-FINE HENNY PEN NEEDLE) 32 gauge x 5/32\" Ndle USE WITH LIRAGULATIDE 100 each 4     acetaminophen (TYLENOL) 500 MG tablet Take 500 mg by mouth every 6 (six) hours as needed.        albuterol (PROVENTIL) 2.5 mg /3 mL (0.083 %) nebulizer solution Take 3 mL (2.5 mg total) by nebulization every 4 (four) hours as needed for wheezing.  0     amLODIPine (NORVASC) 10 MG tablet Take 1 tablet (10 mg total) by mouth every morning. Hold for SBP < 110  0     ARIPiprazole (ABILIFY) 5 MG tablet TAKE 1 TABLET (5 MG TOTAL) BY MOUTH DAILY. 30 tablet 4     bisacodyl (DULCOLAX) 10 mg " suppository Daily as needed for constipation  0     bisoprolol (ZEBETA) 5 MG tablet Take 2.5 mg by mouth daily.       bumetanide (BUMEX) 2 MG tablet Take 2 mg by mouth 2 (two) times a day.       collagenase ointment Apply 1 application topically daily. To right foot       diclofenac sodium (VOLTAREN) 1 % Gel Apply topically 2 (two) times a day.       famotidine (PEPCID) 20 MG tablet Take 20 mg by mouth 2 (two) times a day.        fish oil-omega-3 fatty acids 300-1,000 mg capsule Take 1 capsule by mouth 2 (two) times a day.       fluticasone propion-salmeteroL (ADVAIR) 100-50 mcg/dose DISKUS Inhale 1 puff every 12 (twelve) hours.       folic acid (FOLVITE) 1 MG tablet Take 1 tablet (1 mg total) by mouth daily.  0     gabapentin (NEURONTIN) 300 MG capsule Take 300 mg by mouth 2 (two) times a day. 300 mg in the morning an din the afternoon       gabapentin (NEURONTIN) 300 MG capsule Take 600 mg by mouth at bedtime.       ipratropium-albuteroL (DUO-NEB) 0.5-2.5 mg/3 mL nebulizer Inhale every 4 (four) hours.       Rusty Therapuetic Nutrition (RUSTY) 7-7-1.5 gram PwPk Take 1 packet by mouth 2 (two) times a day.  0     lamoTRIgine (LAMICTAL) 150 MG tablet Take 150 mg by mouth 2 (two) times a day.       levETIRAcetam (KEPPRA) 500 MG tablet Take 750 mg by mouth 2 (two) times a day.        meclizine (ANTIVERT) 25 mg tablet Take 25 mg by mouth 3 (three) times a day as needed for dizziness.       methocarbamoL (ROBAXIN) 750 MG tablet Take 1 tablet by mouth 2 (two) times a day.       metoprolol tartrate (LOPRESSOR) 25 MG tablet Take 0.5 tablets (12.5 mg total) by mouth 2 (two) times a day. Hold for SBP < 100, HR < 60  0     miconazole nitrate (CRITIC-AID AF) 2 % Oint ointment Apply to perianal rash, anterior thigh  0     miscellaneous medical supply [facility] New CPAP machine for home use at pressure: 10-20 cmw , Heated humidifier x 1 q 5yr, water chamber x 1 q 6 mo,  chin strap x 1 q 6 mo,  Full Face Mask x 1 q 3mo, Full face  cushion x 1 q mo, Heated PAP tubing x 1 q 3 mo, Headgear x 1 q 6 mo, non-disposable filters x 1 q 6 mo, disposable filter x 2 q mo; Length of Need: 99 months; Frequency of use: Daily       multivitamin (ONE A DAY) per tablet Take 1 tablet by mouth daily.       multivitamin with minerals (THERA-M) 9 mg iron-400 mcg Tab tablet Take 1 tablet by mouth daily.  0     nicotine (NICODERM CQ) 14 mg/24 hr Place 1 patch on the skin daily.       OLANZapine (ZYPREXA) 2.5 MG tablet TAKE 1 TABLET BY MOUTH THREE TIMES A DAY AS NEEDED FOR AGITATION 90 tablet 4     potassium chloride (K-DUR,KLOR-CON) 20 MEQ tablet Take 20 mEq by mouth 2 (two) times a day with meals.       spironolactone (ALDACTONE) 25 MG tablet Take 25 mg by mouth daily before breakfast.       torsemide (DEMADEX) 20 MG tablet Take 40 mg by mouth 2 (two) times a day at 9am and 6pm.       venlafaxine (EFFEXOR-XR) 37.5 MG 24 hr capsule TAKE 1 CAPSULE (37.5 MG TOTAL) BY MOUTH DAILY. 30 capsule 4     white petrolatum (AQUAPHOR NATURAL HEALING) 41 % Oint To dry skin  0     No current facility-administered medications for this visit.

## 2021-06-13 NOTE — PROGRESS NOTES
"Marlo Kearns is a 40 y.o. female who is being evaluated via a billable video visit.      The patient has been notified of following:     \"This video visit will be conducted via a call between you and your physician/provider. We have found that certain health care needs can be provided without the need for an in-person physical exam.  This service lets us provide the care you need with a video conversation.  If a prescription is necessary we can send it directly to your pharmacy.  If lab work is needed we can place an order for that and you can then stop by our lab to have the test done at a later time.    Video visits are billed at different rates depending on your insurance coverage. Please reach out to your insurance provider with any questions.    If during the course of the call the physician/provider feels a video visit is not appropriate, you will not be charged for this service.\"    Patient has given verbal consent to a Video visit? Yes  How would you like to obtain your AVS? AVS Preference: [email or web address].  If dropped by the video visit, the video invitation should be sent to: Other e-mail: [email or web address]  Will anyone else be joining your video visit? No        Video Start Time: 2:05 pm      Medical  Weight Loss Initial Diet Evaluation  Marlo is presenting today for a new weight management nutrition consultation. Pt has had an initial appointment with Dr. Naqvi.  Weight loss medication: Victoza .   Nutrition Assessment:   Anthropometrics:  Pt's Weight: (!) 374 lb (169.6 kg)    BMI: There is no height or weight on file to calculate BMI.   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     Borderline personality disorder (H)     Encephalopathy     Essential hypertension     Intraventricular hemorrhage (H)     Hemiplegia (H)     Need for prophylactic measure     Physical deconditioning     Malnutrition (H)     TBI (traumatic brain injury) (H)     Unspecified episodic mood disorder     Diastolic dysfunction     Seizure disorder (H)     Wound of right foot     Foot infection     Bipolar disorder (H)     Tobacco dependence     Septicemia (H)     S/P coil embolization of cerebral aneurysm     Pulmonary hypertension (H)     PTSD (post-traumatic stress disorder)     CARMENZA (obstructive sleep apnea)     Neuropathy     Morbid obesity with BMI of 60.0-69.9, adult (H)     Class 3 obesity with alveolar hypoventilation, serious comorbidity, and body mass index (BMI) of 60.0 to 69.9 in adult (H)     Microscopic hematuria     Lung nodules     Hyperglycemia     Hyperlipidemia, unspecified hyperlipidemia type     History of methamphetamine abuse (H)     Hemiparesis affecting right side as late effect of cerebrovascular accident (CVA) (H)     Heartburn     H/O spont intraparenchymal intracranial hemorrhage d/t cerebral AVM     Chest pain     Anxiety     Arteriovenous malformation of brain     Aphasia as late effect of cerebrovascular accident     Alcohol abuse, episodic     Adjustment disorder with depressed mood     Acute on chronic diastolic CHF (congestive heart failure) (H)     Lymphedema     Chronic pain     Hemiplegia of dominant side as late effect of cerebrovascular disease (H)     Impaired mobility     Mild persistent asthma without complication     Neuropathy of both feet     Shoulder joint pain      Diabetes: No  Nutrition History:   Food allergies/intolerances/cultural or religous food customs: Yes Fish, Banana (throat itches)   Vitamins/Mineral Supplementation: MVI  Dietary Recall:  (pt reports that she is currently following  a low sodium diet)  Breakfast: eggs or Cheerios or toast   Lunch: spaghetti or hotdish   Dinner: meat, potato, vegetable   Typical Snacks: Cheetos or crackers or granola bar or yogurt   Overnight eating: No  Eating out: 1 time/week   Beverages: coffee, minimal water, reg pop, diet pop   Exercise: walking with her walker as able/tolerated, PT exercises     Nutrition Diagnosis (PES statement):   Overweight/Obesity (NC 3.3) related to overeating and poor lifestyle habits as evidenced by patient report of excessive energy intake and BMI 68.41 kg/m2.  Nutrition Intervention:  1. Food and/or Nutrient Delivery   a. Placed emphasis on importance of developing a healthy meal routine, aiming for 3 meals a day and no snacks.  2. Nutrition Education   a. Discussed with patient how to build a meal: the importance of including a lean/low fat protein at each meal, include a source of vegetables at a minimum of lunch and dinner and limiting carbohydrate intake to 1 serving per meal.  b. Educated on sources of lean protein, portion sizes, the amount of grams found in each source. Recommend patient to aim for 20-30g protein at each meal.  c. Discussed the importance of adequate hydration, with emphasis on drinking 64oz of water or zero calorie beverages per day.  3. Nutrition Counseling   a. Encouraged importance of developing routine exercise for health benefits and weight loss.  Goals established by patient:   1. Focus on protein first, aiming for 20-30 grams of protein/meal, 3 meals/day.   2. Increase water consumption and at the same time wean off of reg/diet pop.   3. Limit carbohydrates to no more than 1 serving/meal.   Handouts provided:  Non Surgical Weight Loss Packet  Assessment/Plan:    Pt will follow up in 1 month(s) with dietitian.     Video-Visit Details    Type of service:  Video Visit    Video End Time (time video stopped): 2:35 pm   Originating Location (pt. Location): Home    Distant Location (provider location):  [facility]  [facility] [place]     Platform used for Video Visit: Carlo Camargo, RD

## 2021-06-13 NOTE — PROGRESS NOTES
Patient's impression of how medication is working? Okay    Compliant with Medication? yes    Side Effects: Dizzy    Current Symptoms : Yes    Pain (0-10) No  Appetite change No  Negative thoughts Yes  Alcohol use No  Drug use No  Mood swings Yes  Sleep disturbance Yes  Change in interest Yes  Change in energy Yes  Change in concentration Yes  Psychosis/Hallucinations No  Anxiety high  Sad/depressed mood high

## 2021-06-13 NOTE — PROGRESS NOTES
MTM Follow Up Encounter  Assessment & Plan                                                     1. Morbid obesity with BMI of 60.0-69.9, adult (H)  She has successfully started Victoza and titrated to 1.2 mg daily with benefits of a decrease in appetite and caloric intake.  She is tolerating current dosing without significant adverse effects.  Recommend continue current dose, and if able and 1 to 2 weeks titrate to max dose of 1.8 mg daily.  She is due to follow-up with the weight loss clinic with registered dietitian on December 8, she will then ask when to follow-up with Dr. Naqvi thereafter.  No further questions at this time.     Follow Up  Return in about 8 days (around 12/8/2020) for Registered dietitian.    Subjective & Objective                                                       Marlo Kearns is a 40 y.o. female called for a follow up visit for Medication Therapy Management. She was referred to me from Dr. Naqvi.     Patient consented to a telehealth visit: Yes    Reason for visit: Medication management follow-up    Medication Adherence/Access: Stable, no issues identified.    Obesity: Per Dr. Naqvi, she was previously educated on how to use liraglutide, she is using the Victoza pen for weight loss.  She has been able to effectively start Victoza, at the 0.6 mg daily dose, and has now titrated to the 1.2 daily dose.  She does have nausea that comes and goes, finds that she feels better when she lays down and rests.  She has had a decrease in appetite and is getting full faster, noting that she is only eating 1-2 meals per day.  She finds that she has a lot of learning to do about healthy eating and getting used to the fact that she is eating less.  She has a follow-up with dietitian on December 8.  She feels most comfortable continuing on the 1.2 mg daily Victoza based on current effects.  Denies changes in bowels or indigestion.  She has not noticed any weight loss, she is monitoring her weight  daily in the early morning.    PMH: reviewed in EPIC   Allergies/ADRs: reviewed in EPIC   Alcohol: None  Tobacco:   Social History     Tobacco Use   Smoking Status Current Every Day Smoker     Packs/day: 0.50   Smokeless Tobacco Never Used     Today's Vitals:  BP Readings from Last 3 Encounters:   10/25/18 128/63   01/11/17 150/82   05/11/15 123/63   ----------------    The patient declined an after visit summary    I spent 10 minutes with this patient today;   All changes were made via collaborative practice agreement with Dr. Naqvi. A copy of the visit note was provided to the patient's provider.     Jamison Baez, PharmD, BCACP  Medication Management (MTM) Pharmacist  [facility]    Telemedicine Visit Details    Type of service:  Telephone     Start Time: 3PM  End Time: 3:10 PM    Originating Location (pt. Location): Home    Distant Location (provider location):  [place] MEDICATION THERAPY MANAGEMENT [facility]    Mode of Communication: Telephone    Current Outpatient Medications   Medication Sig Dispense Refill     acetaminophen (TYLENOL) 500 MG tablet Take 500 mg by mouth every 6 (six) hours as needed.        albuterol (PROVENTIL) 2.5 mg /3 mL (0.083 %) nebulizer solution Take 3 mL (2.5 mg total) by nebulization every 4 (four) hours as needed for wheezing.  0     amLODIPine (NORVASC) 10 MG tablet Take 1 tablet (10 mg total) by mouth every morning. Hold for SBP < 110  0     ARIPiprazole (ABILIFY) 5 MG tablet TAKE 1 TABLET (5 MG TOTAL) BY MOUTH DAILY. 30 tablet 4     bisacodyl (DULCOLAX) 10 mg suppository Daily as needed for constipation  0     bisoprolol (ZEBETA) 5 MG tablet Take 2.5 mg by mouth daily.       bumetanide (BUMEX) 2 MG tablet Take 2 mg by mouth 2 (two) times a day.       collagenase ointment Apply 1 application topically daily. To right foot       diclofenac sodium (VOLTAREN) 1 % Gel Apply topically 2 (two) times a day.       famotidine (PEPCID)  20 MG tablet Take 20 mg by mouth 2 (two) times a day.        fish oil-omega-3 fatty acids 300-1,000 mg capsule Take 1 capsule by mouth 2 (two) times a day.       fluticasone propion-salmeteroL (ADVAIR) 100-50 mcg/dose DISKUS Inhale 1 puff every 12 (twelve) hours.       folic acid (FOLVITE) 1 MG tablet Take 1 tablet (1 mg total) by mouth daily.  0     gabapentin (NEURONTIN) 300 MG capsule Take 300 mg by mouth 2 (two) times a day. 300 mg in the morning an din the afternoon       gabapentin (NEURONTIN) 300 MG capsule Take 600 mg by mouth at bedtime.       ipratropium-albuteroL (DUO-NEB) 0.5-2.5 mg/3 mL nebulizer Inhale every 4 (four) hours.       Rusty Therapuetic Nutrition (RUSTY) 7-7-1.5 gram PwPk Take 1 packet by mouth 2 (two) times a day.  0     lamoTRIgine (LAMICTAL) 150 MG tablet Take 150 mg by mouth 2 (two) times a day.       levETIRAcetam (KEPPRA) 500 MG tablet Take 750 mg by mouth 2 (two) times a day.        liraglutide (VICTOZA 3-AMBIKA) 0.6 mg/0.1 mL (18 mg/3 mL) injection Inject 0.1 mL (0.6 mg total) under the skin daily for 7 days, THEN 0.2 mL (1.2 mg total) daily for 7 days, THEN 0.3 mL (1.8 mg total) daily. 9 mL 11     meclizine (ANTIVERT) 25 mg tablet Take 25 mg by mouth 3 (three) times a day as needed for dizziness.       methocarbamoL (ROBAXIN) 750 MG tablet Take 1 tablet by mouth 2 (two) times a day.       metoprolol tartrate (LOPRESSOR) 25 MG tablet Take 0.5 tablets (12.5 mg total) by mouth 2 (two) times a day. Hold for SBP < 100, HR < 60  0     miconazole nitrate (CRITIC-AID AF) 2 % Oint ointment Apply to perianal rash, anterior thigh  0     miscellaneous medical supply [facility] [place] for home use at pressure: 10-20 cmw , Heated humidifier x 1 q 5yr, water chamber x 1 q 6 mo,  chin strap x 1 q 6 mo,  Full Face Mask x 1 q 3mo, Full face cushion x 1 q mo, Heated PAP tubing x 1 q 3 mo, Headgear x 1 q 6 mo, non-disposable filters x 1 q 6 mo, disposable filter x 2 q mo; Length of Need: 99 months;  "Frequency of use: Daily       multivitamin (ONE A DAY) per tablet Take 1 tablet by mouth daily.       multivitamin with minerals (THERA-M) 9 mg iron-400 mcg Tab tablet Take 1 tablet by mouth daily.  0     nicotine (NICODERM CQ) 14 mg/24 hr Place 1 patch on the skin daily.       OLANZapine (ZYPREXA) 2.5 MG tablet TAKE 1 TABLET BY MOUTH THREE TIMES A DAY AS NEEDED FOR AGITATION 90 tablet 4     pen needle, diabetic (BD ULTRA-FINE HENNY PEN NEEDLE) 32 gauge x 5/32\" Ndle USE WITH LIRAGULATIDE 100 each 4     potassium chloride (K-DUR,KLOR-CON) 20 MEQ tablet Take 20 mEq by mouth 2 (two) times a day with meals.       spironolactone (ALDACTONE) 25 MG tablet Take 25 mg by mouth daily before breakfast.       torsemide (DEMADEX) 20 MG tablet Take 40 mg by mouth 2 (two) times a day at 9am and 6pm.       venlafaxine (EFFEXOR-XR) 37.5 MG 24 hr capsule TAKE 1 CAPSULE (37.5 MG TOTAL) BY MOUTH DAILY. 30 capsule 4     white petrolatum (AQUAPHOR NATURAL HEALING) 41 % Oint To dry skin  0     No current facility-administered medications for this visit.                        "

## 2021-06-13 NOTE — PROGRESS NOTES
"Malro Kearns is a 40 y.o. female who is being evaluated via a billable telephone visit.      The patient has been notified of following:     \"This telephone visit will be conducted via a call between you and your physician/provider. We have found that certain health care needs can be provided without the need for a physical exam.  This service lets us provide the care you need with a short phone conversation.  If a prescription is necessary we can send it directly to your pharmacy.  If lab work is needed we can place an order for that and you can then stop by our lab to have the test done at a later time.    Telephone visits are billed at different rates depending on your insurance coverage. During this emergency period, for some insurers they may be billed the same as an in-person visit.  Please reach out to your insurance provider with any questions.    If during the course of the call the physician/provider feels a telephone visit is not appropriate, you will not be charged for this service.\"    Patient has given verbal consent to a Telephone visit? Yes    What phone number would you like to be contacted at? [phone]    Patient would like to receive their AVS by AVS Preference: Alva.    Additional provider notes: insert own note template here None     Phone call duration: 20 minutes    Odette Camargo RD        Medical  Weight Loss Follow-Up Diet Evaluation  Assessment:  Marlo is presenting today for a follow up weight management nutrition consultation. Pt has had an initial appointment with Dr. Naqvi.   Weight loss medication: Victoza .   Pt's No data recorded  BMI: There is no height or weight on file to calculate BMI.  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     Borderline personality disorder (H)     Encephalopathy     Essential hypertension     Intraventricular hemorrhage (H)     Hemiplegia (H)     Need for prophylactic measure     Physical deconditioning     Malnutrition (H)     TBI (traumatic brain injury) (H)     Unspecified episodic mood disorder     Diastolic dysfunction     Seizure disorder (H)     Wound of right foot     Foot infection     Bipolar disorder (H)     Tobacco dependence     Septicemia (H)     S/P coil embolization of cerebral aneurysm     Pulmonary hypertension (H)     PTSD (post-traumatic stress disorder)     CARMENZA (obstructive sleep apnea)     Neuropathy     Morbid obesity with BMI of 60.0-69.9, adult (H)     Class 3 obesity with alveolar hypoventilation, serious comorbidity, and body mass index (BMI) of 60.0 to 69.9 in adult (H)     Microscopic hematuria     Lung nodules     Hyperglycemia     Hyperlipidemia, unspecified hyperlipidemia type     History of methamphetamine abuse (H)     Hemiparesis affecting right side as late effect of cerebrovascular accident (CVA) (H)     Heartburn     H/O spont intraparenchymal intracranial hemorrhage d/t cerebral AVM     Chest pain     Anxiety     Arteriovenous malformation of brain     Aphasia as late effect of cerebrovascular accident     Alcohol abuse, episodic     Adjustment disorder with depressed mood     Acute on chronic diastolic CHF (congestive heart failure) (H)     Lymphedema     Chronic pain     Hemiplegia of dominant side as late effect of cerebrovascular disease (H)     Impaired mobility     Mild persistent asthma without complication     Neuropathy of both feet     Shoulder joint pain     Diabetes: No     Progress on goals from last visit: Has been exercising more frequently.  Has been trying to work on mindfulness when it comes to her eating  behaviors.  Has been working on reducing her intake of chips, candy, etc...  Has been working on reducing her pop intake, especially regular pop.  Patient reports that she has been trying to consume more water.     Dietary Recall:  Breakfast: fruit, burrito (1/2)   Lunch:leftoves from the night before (mashed potatoes, green beans), dessert typically   Dinner:steak, mashed potatoes, green beans  Typical snacks: fruit (strawberries, blackberries, grapes, walnuts, whipped cream)   Beverages: Water-2-3 bottles/day, Diet Pop 2-3 times/day, Coffee   Exercise: Stretching, Dancing, Arm Exercises, Chair Exercises, Walking trying to strive for 3-4 days/week for 7-15 minutes (as tolerated)   Also has been doing PT 1 day/week in addition     Nutrition Diagnosis:    Overweight/Obesity (NC 3.3) related to overeating and poor lifestyle habits as evidenced by patient's subjective statements (excessive energy intake) and BMI 68.41 kg/m2.    Intervention:  1. Food and/or nutrient delivery: balanced meals, adequate protein  2. Nutrition education: protein intake, portion sizes, water consumption   3. Nutrition counseling: exercise regimen     Monitoring/Evaluation:    Goals:    1. Consume protein first, aiming for 20-30 grams/meal, 3 meals/day.    2. Continue to work on limit carbohydrates to 1 serving/meal.   3. Continue to work on increasing water consumption and reducing diet pop.     Patient to follow up in 1 month(s) with ALBA

## 2021-06-14 NOTE — PROGRESS NOTES
Marlo Kearns is a 40 y.o. female who is being evaluated via a billable video visit.      How would you like to obtain your AVS? [email or web address]  If dropped from the video visit, the video invitation should be resent by: Other e-mail: [email or web address]   Will anyone else be joining your video visit? No      Video Start Time: 2:11 PM         Video-Visit Details    Type of service:  Video Visit    Video End Time (time video stopped): 2:37 PM  Originating Location (pt. Location): Home    Distant Location (provider location):  [facility]     Platform used for Video Visit: [employer]        [facility]  Weight Loss Follow-Up Diet Evaluation  Assessment:  Marlo is presenting today for a follow up weight management nutrition consultation. Pt has had an initial appointment with Dr. Naqvi.  Weight loss medication: Victoza .   Pt's No data recorded  BMI: There is no height or weight on file to calculate BMI.  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     Borderline personality disorder (H)     Encephalopathy     Essential hypertension     Intraventricular hemorrhage (H)     Hemiplegia (H)     Need for prophylactic measure     Physical deconditioning     Malnutrition (H)     TBI (traumatic brain injury) (H)     Unspecified episodic mood disorder     Diastolic dysfunction     Seizure disorder (H)     Wound of right foot     Foot infection     Bipolar disorder (H)     Tobacco dependence     Septicemia (H)     S/P coil embolization of cerebral aneurysm     Pulmonary hypertension (H)     PTSD (post-traumatic  stress disorder)     CARMENZA (obstructive sleep apnea)     Neuropathy     Morbid obesity with BMI of 60.0-69.9, adult (H)     Class 3 obesity with alveolar hypoventilation, serious comorbidity, and body mass index (BMI) of 60.0 to 69.9 in adult (H)     Microscopic hematuria     Lung nodules     Hyperglycemia     Hyperlipidemia, unspecified hyperlipidemia type     History of methamphetamine abuse (H)     Hemiparesis affecting right side as late effect of cerebrovascular accident (CVA) (H)     Heartburn     H/O spont intraparenchymal intracranial hemorrhage d/t cerebral AVM     Chest pain     Anxiety     Arteriovenous malformation of brain     Aphasia as late effect of cerebrovascular accident     Alcohol abuse, episodic     Adjustment disorder with depressed mood     Acute on chronic diastolic CHF (congestive heart failure) (H)     Lymphedema     Chronic pain     Hemiplegia of dominant side as late effect of cerebrovascular disease (H)     Impaired mobility     Mild persistent asthma without complication     Neuropathy of both feet     Shoulder joint pain     Diabetes: No     Progress on goals from last visit: has been working on smaller portion sizes at meals,     Dietary Recall:  Breakfast: yogurt, orange or cereal or eggs   Lunch:single cheeseburger with apple slices or french fries  Dinner:skipped or rice, vegetable, meat (sticking with low sodium, using garlic powder)   Typical snacks: chocolate 1 time/day, apple, grapes  Eating out: 1 time/week   Beverages: (Patient is currently on a 2000 mL/day Fluid Restriction) Water 3 bottles/day, Diet Pop 2-4 bottles/day (has been working on reducing), Coffee 3 cups/day +  Exercise: walking 5 laps around the house daily-working up to 15 minutes   Leg and arm exercises as well, Sit Ups   Just finished up with PT     Nutrition Diagnosis:    Overweight/Obesity (NC 3.3) related to overeating and poor lifestyle habits as evidenced by patient's subjective statements (h/o excessive  energy intake) and BMI of 67.4 kg/m2.       Intervention:  1. Food and/or nutrient delivery: balanced meals, adequate protein   2. Nutrition education: low sodium diet, snack options, fluid restriction/plan   3. Nutrition counseling: exercise regimen, praised patient for positive changes made thus far    Monitoring/Evaluation:    Goals:  1. Adhere to fluid restriction, consuming at least 75% of it as water.  Reducing diet pop and coffee consumption.    2. Continue to avoid high sodium containing foods.   3. Focus on protein first, aiming for 20-30 grams of protein/meal, 3 meals/day.   4. Continue to increase exercise as able/tolerated.     Patient to follow up in 3 week(s) with ALBA

## 2021-06-14 NOTE — PROGRESS NOTES
"Marlo Kearns is a 40 y.o. female who is being evaluated via a billable telephone visit.      The patient has been notified of following:     \"This telephone visit will be conducted via a call between you and your physician/provider. We have found that certain health care needs can be provided without the need for a physical exam.  This service lets us provide the care you need with a short phone conversation.  If a prescription is necessary we can send it directly to your pharmacy.  If lab work is needed we can place an order for that and you can then stop by our lab to have the test done at a later time.    Telephone visits are billed at different rates depending on your insurance coverage. During this emergency period, for some insurers they may be billed the same as an in-person visit.  Please reach out to your insurance provider with any questions.    If during the course of the call the physician/provider feels a telephone visit is not appropriate, you will not be charged for this service.\"    Patient has given verbal consent to a Telephone visit? Yes    What phone number would you like to be contacted at? [phone]    Patient would like to receive their AVS by AVS Preference: Alva.    Additional provider notes: insert own note template here None     Phone call duration: 20 minutes    Odette Camargo RD          Medical  Weight Loss Follow-Up Diet Evaluation  Assessment:  Marlo is presenting today for a follow up weight management nutrition consultation. Pt has had an initial appointment with Dr. Naqvi  Weight loss medication: Victoza .   Pt's No data recorded  BMI: There is no height or weight on file to calculate BMI.  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     Borderline personality disorder (H)     Encephalopathy     Essential hypertension     Intraventricular hemorrhage (H)     Hemiplegia (H)     Need for prophylactic measure     Physical deconditioning     Malnutrition (H)     TBI (traumatic brain injury) (H)     Unspecified episodic mood disorder     Diastolic dysfunction     Seizure disorder (H)     Wound of right foot     Foot infection     Bipolar disorder (H)     Tobacco dependence     Septicemia (H)     S/P coil embolization of cerebral aneurysm     Pulmonary hypertension (H)     PTSD (post-traumatic stress disorder)     CARMENZA (obstructive sleep apnea)     Neuropathy     Morbid obesity with BMI of 60.0-69.9, adult (H)     Class 3 obesity with alveolar hypoventilation, serious comorbidity, and body mass index (BMI) of 60.0 to 69.9 in adult (H)     Microscopic hematuria     Lung nodules     Hyperglycemia     Hyperlipidemia, unspecified hyperlipidemia type     History of methamphetamine abuse (H)     Hemiparesis affecting right side as late effect of cerebrovascular accident (CVA) (H)     Heartburn     H/O spont intraparenchymal intracranial hemorrhage d/t cerebral AVM     Chest pain     Anxiety     Arteriovenous malformation of brain     Aphasia as late effect of cerebrovascular accident     Alcohol abuse, episodic     Adjustment disorder with depressed mood     Acute on chronic diastolic CHF (congestive heart failure) (H)     Lymphedema     Chronic pain     Hemiplegia of dominant side as late effect of cerebrovascular disease (H)     Impaired mobility     Mild persistent asthma without complication     Neuropathy of both feet     Shoulder joint pain     Diabetes: No     Progress on goals from last  visit: patient reports that she has had a rough couple of weeks, noting that she has been eating unhealthy and consuming more calorie containing beverages.  Patient reports that continues to be conscious regarding sodium intake, trying not to consume as much.  Patient reports that she noticed that she does tend to snack in the evening out of boredom vs hunger, knowing that she is aware of it and wants to look at OA for a way to help cope with it.       Beverages: continues to follow a fluid restriction (2000 mL/day)-water, Diet pop, Coffee   Exercise: leg, arm, and back exercises 15 minutes daily   Patient reports that she needs to get back on track with walking laps around her house again     Nutrition Diagnosis:    Overweight/Obesity (NC 3.3) related to overeating and poor lifestyle habits as evidenced by patient's subjective statements (h/o excessive energy intake) and BMI 67.6 kg/m2.       Intervention:  1. Food and/or nutrient delivery: balanced meals, adequate protein   2. Nutrition education: coping strategies for boredom eating, hydration   3. Nutrition counseling: exercise regimen     Monitoring/Evaluation:    Goals:  1. Compose a list of 10 activities that you enjoy working on to use as an option to cope with boredom.   2. Eliminate calorie containing beverages, focus on water and tea.   3. Start walking regimen again, aim for 15 minutes daily.     Patient to follow up in 1 months(s)  with ALBA

## 2021-06-15 NOTE — PROGRESS NOTES
"Marlo Kearns is a 40 y.o. female who is being evaluated via a billable telephone visit.      The patient has been notified of following:     \"This telephone visit will be conducted via a call between you and your physician/provider. We have found that certain health care needs can be provided without the need for a physical exam.  This service lets us provide the care you need with a short phone conversation.  If a prescription is necessary we can send it directly to your pharmacy.  If lab work is needed we can place an order for that and you can then stop by our lab to have the test done at a later time.    Telephone visits are billed at different rates depending on your insurance coverage. During this emergency period, for some insurers they may be billed the same as an in-person visit.  Please reach out to your insurance provider with any questions.    If during the course of the call the physician/provider feels a telephone visit is not appropriate, you will not be charged for this service.\"    Patient has given verbal consent to a Telephone visit? Yes    What phone number would you like to be contacted at? [phone]    Patient would like to receive their AVS by AVS Preference: E-Mail (Inform patient AVS not encrypted with this option).    Additional provider notes: insert own note template here none     Phone call duration: 15 minutes    Odette Camargo RD          Medical  Weight Loss Follow-Up Diet Evaluation  Assessment:  Marlo is presenting today for a follow up weight management nutrition consultation. Pt has had an initial appointment with Dr. Naqvi.   Weight loss medication: Victoza.   Pt's No data recorded  BMI: There is no height or weight on file to calculate BMI.  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     Borderline personality disorder (H)     Encephalopathy     Essential hypertension     Intraventricular hemorrhage (H)     Hemiplegia (H)     Need for prophylactic measure     Physical deconditioning     Malnutrition (H)     TBI (traumatic brain injury) (H)     Unspecified episodic mood disorder     Diastolic dysfunction     Seizure disorder (H)     Wound of right foot     Foot infection     Bipolar disorder (H)     Tobacco dependence     Septicemia (H)     S/P coil embolization of cerebral aneurysm     Pulmonary hypertension (H)     PTSD (post-traumatic stress disorder)     CARMENZA (obstructive sleep apnea)     Neuropathy     Morbid obesity with BMI of 60.0-69.9, adult (H)     Class 3 obesity with alveolar hypoventilation, serious comorbidity, and body mass index (BMI) of 60.0 to 69.9 in adult (H)     Microscopic hematuria     Lung nodules     Hyperglycemia     Hyperlipidemia, unspecified hyperlipidemia type     History of methamphetamine abuse (H)     Hemiparesis affecting right side as late effect of cerebrovascular accident (CVA) (H)     Heartburn     H/O spont intraparenchymal intracranial hemorrhage d/t cerebral AVM     Chest pain     Anxiety     Arteriovenous malformation of brain     Aphasia as late effect of cerebrovascular accident     Alcohol abuse, episodic     Adjustment disorder with depressed mood     Acute on chronic diastolic CHF (congestive heart failure) (H)     Lymphedema     Chronic pain     Hemiplegia of dominant side as late effect of cerebrovascular disease (H)     Impaired mobility     Mild persistent asthma without complication     Neuropathy of both feet     Shoulder joint pain     Diabetes: No     Progress on goals from last visit: Patient reports  that she continues to work on consuming smaller portion sizes at meals.  Patient reports that she has not started a walking regimen, waiting for the weather to get a little nicer.  Patient repots that she has a Wii now and plans on using that as a way to incorporate more exercise.      Beverages: 2000 ml/day fluid restriction (more water, coffee, diet pop)   Exercise: Strength Training daily for 15-30 minutes, plans on starting a walking regimen once the weather gets a little warmer     Nutrition Diagnosis:    Overweight/Obesity (NC 3.3) related to overeating and poor lifestyle habits as evidenced by patient's subjective statements (h/o excessive energy intake) and BMI 66.5 kg/m2.     Intervention:  1. Food and/or nutrient delivery: balanced meals, adequate protein   2. Nutrition education: discussion regarding hydration  3. Nutrition counseling: exercise regimen       Monitoring/Evaluation:    Goals:  1. Start a walking regimen, aim for 15 minutes daily.    2. Continue to follow fluid restriction, limiting to <2000 mL/day (mainly being water).     Patient to follow up in 1 months(s) with bariatrician and 1 month(s) with ALBA

## 2021-06-16 ENCOUNTER — OFFICE VISIT - HEALTHEAST (OUTPATIENT)
Dept: FAMILY MEDICINE | Facility: CLINIC | Age: 41
End: 2021-06-16

## 2021-06-16 DIAGNOSIS — E66.1 CLASS 3 DRUG-INDUCED OBESITY WITH SERIOUS COMORBIDITY AND BODY MASS INDEX (BMI) GREATER THAN OR EQUAL TO 70 IN ADULT (H): ICD-10-CM

## 2021-06-16 DIAGNOSIS — E78.5 HYPERLIPIDEMIA, UNSPECIFIED HYPERLIPIDEMIA TYPE: ICD-10-CM

## 2021-06-16 DIAGNOSIS — E66.813 CLASS 3 DRUG-INDUCED OBESITY WITH SERIOUS COMORBIDITY AND BODY MASS INDEX (BMI) GREATER THAN OR EQUAL TO 70 IN ADULT (H): ICD-10-CM

## 2021-06-16 DIAGNOSIS — E11.9 TYPE 2 DIABETES MELLITUS WITHOUT COMPLICATION, WITHOUT LONG-TERM CURRENT USE OF INSULIN (H): ICD-10-CM

## 2021-06-16 DIAGNOSIS — I10 ESSENTIAL HYPERTENSION: ICD-10-CM

## 2021-06-16 DIAGNOSIS — I50.33 ACUTE ON CHRONIC DIASTOLIC CHF (CONGESTIVE HEART FAILURE) (H): ICD-10-CM

## 2021-06-16 DIAGNOSIS — Z71.3 NUTRITIONAL COUNSELING: ICD-10-CM

## 2021-06-16 PROBLEM — I27.20 PULMONARY HYPERTENSION (H): Status: ACTIVE | Noted: 2020-08-13

## 2021-06-16 PROBLEM — F43.10 PTSD (POST-TRAUMATIC STRESS DISORDER): Status: ACTIVE | Noted: 2020-10-08

## 2021-06-16 PROBLEM — F17.200 TOBACCO DEPENDENCE: Status: ACTIVE | Noted: 2020-10-08

## 2021-06-16 PROBLEM — G40.909 SEIZURE DISORDER (H): Status: ACTIVE | Noted: 2018-09-26

## 2021-06-16 PROBLEM — I69.959 HEMIPLEGIA OF DOMINANT SIDE AS LATE EFFECT OF CEREBROVASCULAR DISEASE (H): Status: ACTIVE | Noted: 2020-10-26

## 2021-06-16 PROBLEM — R07.9 CHEST PAIN: Status: ACTIVE | Noted: 2019-05-29

## 2021-06-16 PROBLEM — I51.89 DIASTOLIC DYSFUNCTION: Status: ACTIVE | Noted: 2018-09-26

## 2021-06-16 PROBLEM — F41.9 ANXIETY: Status: ACTIVE | Noted: 2020-10-08

## 2021-06-16 PROBLEM — R73.9 HYPERGLYCEMIA: Status: ACTIVE | Noted: 2020-10-08

## 2021-06-16 PROBLEM — G62.9 NEUROPATHY: Status: ACTIVE | Noted: 2018-09-14

## 2021-06-16 PROBLEM — J45.30 MILD PERSISTENT ASTHMA WITHOUT COMPLICATION: Status: ACTIVE | Noted: 2020-10-26

## 2021-06-16 PROBLEM — S91.301A WOUND OF RIGHT FOOT: Status: ACTIVE | Noted: 2018-09-26

## 2021-06-16 PROBLEM — E66.2: Status: ACTIVE | Noted: 2019-11-20

## 2021-06-16 PROBLEM — Z86.79: Status: ACTIVE | Noted: 2017-11-20

## 2021-06-16 PROBLEM — G57.93 NEUROPATHY OF BOTH FEET: Status: ACTIVE | Noted: 2020-10-26

## 2021-06-16 PROBLEM — I89.0 LYMPHEDEMA: Status: ACTIVE | Noted: 2018-09-14

## 2021-06-16 PROBLEM — E66.01 MORBID OBESITY WITH BMI OF 60.0-69.9, ADULT (H): Status: ACTIVE | Noted: 2018-09-14

## 2021-06-16 PROBLEM — Z98.890 S/P COIL EMBOLIZATION OF CEREBRAL ANEURYSM: Status: ACTIVE | Noted: 2020-10-08

## 2021-06-16 PROBLEM — G47.33 OSA (OBSTRUCTIVE SLEEP APNEA): Status: ACTIVE | Noted: 2020-10-08

## 2021-06-16 PROBLEM — Q28.2 ARTERIOVENOUS MALFORMATION OF BRAIN: Status: ACTIVE | Noted: 2020-10-08

## 2021-06-16 PROBLEM — R12 HEARTBURN: Status: ACTIVE | Noted: 2020-10-08

## 2021-06-16 PROBLEM — G89.29 CHRONIC PAIN: Status: ACTIVE | Noted: 2020-10-26

## 2021-06-16 PROBLEM — Z74.09 IMPAIRED MOBILITY: Status: ACTIVE | Noted: 2020-10-26

## 2021-06-16 PROBLEM — F15.11 HISTORY OF METHAMPHETAMINE ABUSE (H): Status: ACTIVE | Noted: 2017-11-20

## 2021-06-16 PROBLEM — L08.9 FOOT INFECTION: Status: ACTIVE | Noted: 2018-09-26

## 2021-06-16 PROBLEM — R91.8 LUNG NODULES: Status: ACTIVE | Noted: 2019-05-30

## 2021-06-16 PROBLEM — I69.320 APHASIA AS LATE EFFECT OF CEREBROVASCULAR ACCIDENT: Status: ACTIVE | Noted: 2020-10-08

## 2021-06-16 PROBLEM — M25.519 SHOULDER JOINT PAIN: Status: ACTIVE | Noted: 2020-10-26

## 2021-06-16 PROBLEM — I69.351 HEMIPARESIS AFFECTING RIGHT SIDE AS LATE EFFECT OF CEREBROVASCULAR ACCIDENT (CVA) (H): Status: ACTIVE | Noted: 2020-10-08

## 2021-06-16 NOTE — PROGRESS NOTES
"Marlo Kearns is a 40 y.o. female who is being evaluated via a billable telephone visit.      The patient has been notified of following:     \"This telephone visit will be conducted via a call between you and your physician/provider. We have found that certain health care needs can be provided without the need for a physical exam.  This service lets us provide the care you need with a short phone conversation.  If a prescription is necessary we can send it directly to your pharmacy.  If lab work is needed we can place an order for that and you can then stop by our lab to have the test done at a later time.    Telephone visits are billed at different rates depending on your insurance coverage. During this emergency period, for some insurers they may be billed the same as an in-person visit.  Please reach out to your insurance provider with any questions.    If during the course of the call the physician/provider feels a telephone visit is not appropriate, you will not be charged for this service.\"    Patient has given verbal consent to a Telephone visit? Yes    What phone number would you like to be contacted at? [phone]    Patient would like to receive their AVS by AVS Preference: E-Mail (Inform patient AVS not encrypted with this option).    Additional provider notes: insert own note template here none    Phone call duration: 15 minutes    Odette Camarog RD          Medical  Weight Loss Follow-Up Diet Evaluation  Assessment:  Marlo is presenting today for a follow up weight management nutrition consultation. Pt has had an initial appointment with Dr. Naqvi.   Weight loss medication: Victoza .   Pt's No data recorded  BMI: There is no height or weight on file to calculate BMI.  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     Borderline personality disorder (H)     Encephalopathy     Essential hypertension     Intraventricular hemorrhage (H)     Hemiplegia (H)     Need for prophylactic measure     Physical deconditioning     Malnutrition (H)     TBI (traumatic brain injury) (H)     Unspecified episodic mood disorder     Diastolic dysfunction     Seizure disorder (H)     Wound of right foot     Foot infection     Bipolar disorder (H)     Tobacco dependence     Septicemia (H)     S/P coil embolization of cerebral aneurysm     Pulmonary hypertension (H)     PTSD (post-traumatic stress disorder)     CARMENZA (obstructive sleep apnea)     Neuropathy     Morbid obesity with BMI of 60.0-69.9, adult (H)     Class 3 obesity with alveolar hypoventilation, serious comorbidity, and body mass index (BMI) of 60.0 to 69.9 in adult (H)     Microscopic hematuria     Lung nodules     Hyperglycemia     Hyperlipidemia, unspecified hyperlipidemia type     History of methamphetamine abuse (H)     Hemiparesis affecting right side as late effect of cerebrovascular accident (CVA) (H)     Heartburn     H/O spont intraparenchymal intracranial hemorrhage d/t cerebral AVM     Chest pain     Anxiety     Arteriovenous malformation of brain     Aphasia as late effect of cerebrovascular accident     Alcohol abuse, episodic     Adjustment disorder with depressed mood     Acute on chronic diastolic CHF (congestive heart failure) (H)     Lymphedema     Chronic pain     Hemiplegia of dominant side as late effect of cerebrovascular disease (H)     Impaired mobility     Mild persistent asthma without complication     Neuropathy of both feet     Shoulder joint pain     Diabetes: No    Progress on goals from last visit: Patient reports  that she has gotten back on track more recently with diet, noting that she had been consuming more high sodium containing foods.  Patient reports that she is trying to focus on more water and less coffee and diet pop.   Patient reports that she does pretty good in regards to adequate fruit consumption, however needs to work on increasing her vegetable intake, noting that she is looking for ideas in order to do so.     Exercise: stated PT again this past month-2 times/week (on average)   Strength Training daily for 15-30 minutes   Some walking as able/tolerated-trying to slowly increase the distance    Nutrition Diagnosis:    Overweight/Obesity (NC 3.3) related to overeating and poor lifestyle habits as evidenced by patient's subjective statements (h/o excessive energy intake) and BMI of 66.5 kg/m2.       Intervention:  1. Food and/or nutrient delivery: balanced meals, adequate protein   2. Nutrition education: increased vegetable intake   3. Nutrition counseling: exercise regimen    Monitoring/Evaluation:    Goals:  1. Increase vegetable consumption, aiming to include a serving of vegetables at lunch and supper.   2. Continue to work on increased walking regimen as tolerated, slowly increase as able.     Patient to follow up in 2 month(s) with ALBA

## 2021-06-17 NOTE — PROGRESS NOTES
"Marlo Kearns is a 40 y.o. female who is being evaluated via a billable telephone visit.      The patient has been notified of following:     \"This telephone visit will be conducted via a call between you and your physician/provider. We have found that certain health care needs can be provided without the need for a physical exam.  This service lets us provide the care you need with a short phone conversation.  If a prescription is necessary we can send it directly to your pharmacy.  If lab work is needed we can place an order for that and you can then stop by our lab to have the test done at a later time.    Telephone visits are billed at different rates depending on your insurance coverage. During this emergency period, for some insurers they may be billed the same as an in-person visit.  Please reach out to your insurance provider with any questions.    If during the course of the call the physician/provider feels a telephone visit is not appropriate, you will not be charged for this service.\"    Patient has given verbal consent to a Telephone visit? Yes    What phone number would you like to be contacted at? [phone]    Patient would like to receive their AVS by AVS Preference: E-Mail (Inform patient AVS not encrypted with this option).    Additional provider notes: insert own note template here none     Phone call duration: 20 minutes    Odette Camargo RD          Medical  Weight Loss Follow-Up Diet Evaluation  Assessment:  Marlo is presenting today for a follow up weight management nutrition consultation. Pt has had an initial appointment with Dr. Naqvi.   Weight loss medication: Victoza .   Pt's No data recorded  BMI: There is no height or weight on file to calculate BMI.  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     Borderline personality disorder (H)     Encephalopathy     Essential hypertension     Intraventricular hemorrhage (H)     Hemiplegia (H)     Need for prophylactic measure     Physical deconditioning     Malnutrition (H)     TBI (traumatic brain injury) (H)     Unspecified episodic mood disorder     Diastolic dysfunction     Seizure disorder (H)     Wound of right foot     Foot infection     Bipolar disorder (H)     Tobacco dependence     Septicemia (H)     S/P coil embolization of cerebral aneurysm     Pulmonary hypertension (H)     PTSD (post-traumatic stress disorder)     CARMENZA (obstructive sleep apnea)     Neuropathy     Morbid obesity with BMI of 60.0-69.9, adult (H)     Class 3 obesity with alveolar hypoventilation, serious comorbidity, and body mass index (BMI) of 60.0 to 69.9 in adult (H)     Microscopic hematuria     Lung nodules     Hyperglycemia     Hyperlipidemia, unspecified hyperlipidemia type     History of methamphetamine abuse (H)     Hemiparesis affecting right side as late effect of cerebrovascular accident (CVA) (H)     Heartburn     H/O spont intraparenchymal intracranial hemorrhage d/t cerebral AVM     Chest pain     Anxiety     Arteriovenous malformation of brain     Aphasia as late effect of cerebrovascular accident     Alcohol abuse, episodic     Adjustment disorder with depressed mood     Acute on chronic diastolic CHF (congestive heart failure) (H)     Lymphedema     Chronic pain     Hemiplegia of dominant side as late effect of cerebrovascular disease (H)     Impaired mobility     Mild persistent asthma without complication     Neuropathy of both feet     Shoulder joint pain     Diabetes: No     Progress on goals from last visit: Has been eating larger  amounts due to recent break up, using food to remedy her emotional state.  Patient reports that she has started counseling sessions to help with her mental state as well. Patient reports that she has been eating out more frequently these days out of convenience.  Patient however reports that more recently, she has started to use Low Sodium MOM's meals to help with convenience, but healthier alternatives.  Patient reports that she is trying to get back on track over the last 3 days.     Beverages: Water (trying to increase), Alcohol-however trying to stay away   Exercise: arm exercises daily, has not been walking much-knows she should get out more    Nutrition Diagnosis:    Overweight/Obesity (NC 3.3) related to overeating and poor lifestyle habits as evidenced by patient's subjective statements (excessive energy intake, lack of exercise) and BMI of 69.6 kg/m2.   Intervention:  1. Food and/or nutrient delivery: balanced meals, adequate protein   2. Nutrition education: no education provided, offering support and encouragement   3. Nutrition counseling: taking each day one step at a time, exercise       Monitoring/Evaluation:    Goals:  1. Try to get back on track with diet, focusing on healthier, low sodium foods.   2. Try walking a couple days/week with a friend for added support and mental clarity.     Patient to follow up in 2 week(s) with ALBA

## 2021-06-18 NOTE — PATIENT INSTRUCTIONS - HE
Patient Instructions by Amisha Banks LPN at 10/26/2020  2:45 PM     Author: Amisha Banks LPN Service: -- Author Type: Licensed Nurse    Filed: 10/26/2020  4:41 PM Encounter Date: 10/26/2020 Status: Signed    : Amisha Banks LPN (Licensed Nurse)       Ultrasound    Thank you for choosing [facility] as partners in your care.  Please read the following information before your appointment.  Feel free to ask questions.    An ultrasound is an exam that uses sound waves to create pictures.  The special camera that takes the pictures is called a transducer.  An ultrasound technologist will perform the exam under the direction of a physician.    Preparation  Ultrasound preps vary.  If you are scheduled for an Aorta Ultrasound, please do not eat or drink anything 8 hours before your exam.  You may take daily medications with a small sip of water.  There is no preparation required for any of the other ultrasound exams performed at [facility].    The Examination  You may or may not need to change clothes for your exam.  The technologist will explain the exam to you.  He or she will ask you a few questions and answer any questions you may have.    You will lie on a table and a gel will be applied to your skin.  A small handheld instrument called a transducer will be moved across the area we are looking at while pictures are taken.  Also, your exam may include measuring your blood pressure on your arms, legs and/or toes.    When the Exam Is Completed  Your physician will receive the results of the exam and explain them to you.  You may return to your normal diet and activities unless otherwise directed by your doctor.  Feel free to ask questions if something is not clear to you.  We welcome comments.    At [facility], we are dedicated to providing the best possible care.  Thank you for taking time to read these instructions.  We hope your experience is as pleasant as  possible.      You are scheduled for the following exam(s):    []Carotid Duplex:  Evaluates the carotid arteries in the neck that feed the brain with blood.  Gel is applied to the skin of the neck.  A transducer will be placed on the gel covered areas to obtain images and evaluate and listen to the blood flow in the arteries.  This exam takes approximately 30 minutes.    []Venous Duplex:  Evaluates the veins that carry blood to the heart from the arms and/or legs.  Gel is applied to the skin of the arms and/or legs.  A transducer will be placed on the gel covered areas to obtain images and evaluate flow in the veins.  This exam takes approximately 30 minutes per arm/leg.    []Arterial Duplex:  Evaluates the arteries that feed the arms and legs with blood.  Gel is applied to the skin of the arms and/or legs.  A transducer will be placed on the gel covered areas to obtain images and listen to the blood flow in the arms and/or legs.  This exam takes approximately 30 minutes per arm/leg.    [x]VIVIAN or Segmental Pressures:  Ultrasound and blood pressure cuffs are used to evaluate the arteries that supply the arms and legs with blood.  Several blood pressure cuffs will be place on your arms and legs.  When inflated, the cuffs will provide blood pressure readings that are used to determine where blockages in the arteries may be.  You may be asked to do a moderate level of exercise during this exam.  This exam takes approximately 30-60 minutes.    []Aorta:  Evaluates the abdominal aorta for blockages and aneurysm.  Gel is applied to the stomach.  A transducer will be placed on the gel covered areas to obtain images of the aorta.  This exam takes approximately 30 minutes.    Prep instructions:  Do not eat or drink anything 8 hours before procedure.  You may take daily medications with a small sip of water.      Ankle-Brachial Index (VIVIAN) or Physiologic Test    Description  An ankle-brachial index test is relatively pain free.  Blood pressure cuffs of various sizes are placed on your thigh, calf, foot and toes.  Similar to having your blood pressure checked with an arm cuff, as the technician inflates the cuffs, they progressively tighten and are then quickly released.  You may feel some discomfort, but generally for less than 60 seconds for each measurement. You will be asked to remove your socks and shoes and possibly your pants or shorts. Gowns will be provided. It usually takes about 30-60 minutes.   Depending on the initial readings and patient symptoms, you may be asked to perform a light walk on a treadmill.  The technician will apply ultrasound gel, usually warmed for your comfort, to your ankles and wrists. Through the gel, the technician will use a small hand-held device that emits sound waves.  Risks  There are typically no side effects or complications associated with a physiologic study.  How to Prepare  Eat and take medications as usual.  There is no preparation required for an ankle-brachial index (VIVIAN) or physiologic exam.  What Can I Expect After the Test?  The technician will send the ultrasound images to your vascular surgeon for evaluation. Typically, a report is available in 2-3 days. If anything critical is found, it is standard practice to notify the vascular surgeon immediately.  Reference: https://vascular.org/patient-resources/vascular-tests        A referral was sent to [facility]. You will receive a phone call in 1-2 business days to schedule you appointment. If for some reason you do not hear from them or wish to schedule sooner please call [phone] to schedule.    Locations    [facility]  [address] [place] [id number]    [facility]    Your care provider has referred you to [facility] for evaluation of weight loss options. We understand that patients who successfully get rid of excess weight can significantly  improve other serious medical conditions.  [facility] offers two choices for patients who require significant weight loss, a surgical program and a non-surgical program.  You don't need to decide which is best for you right now, our team will help you determine the treatment that fits your unique circumstances best.       Please call one of the [place] locations below to schedule an appointment. If you received a prescription please bring it with you to your appointment. Some locations are limited to what they carry.    Office Locations    [facility]  [address] [place] [id number]  Home Medical Equipment, [address]   Phone: [phone]   Orthotics and Prosthetics, [address]   Phone: [phone]

## 2021-06-19 NOTE — PROGRESS NOTES
.  Assessment / Impression     Cognitive and mood disorder secondary to a CVA; increased problems with moods due to being off medications.  Bipolar affective disorder per history.  History of chemical dependency issues.  Patient's impression of how medication is working?  Working good when she is able to get them.  Compliant with medication?  Patient has been out of medications for 2-3 weeks due to noncompliance with follow-up appointments.    Side effects:periodic spots in front of her eyes       Plan:     I did refill her venlafaxine, Abilify, and as needed Zyprexa.  Patient is to find out what medication she uses for nightmares and find out if it is safe to use with her heart failure.  We will have patient follow-up in 6 months, however she is having more issues through medication she is to call or come in sooner.    Subjective:      HPI: Marlo Kearns is a 37 y.o. female with cognitive mood disorder secondary to CVA.  Patient also has a history of bipolar affective disorder and chemical dependency issues.  She is here for follow-up medication evaluation.  Patient is out of her psychotropic medications as she has been frequently canceling her follow-up appointments.  Patient states she has been off the medications about 2-3 weeks and notices a difference.  She is more tearful.  Before being off medication she felt they were working very well for her.  I did inquire about side effects and she states sometimes she will see spots or bugs in front of her eyes but they are not frightening.  Since her last visit, she was diagnosed with heart failure and needing complete workup.  She is asking about a medication for nightmares for the nurse told her from the heart clinic but she cannot remember what it was and if it was safe to take with her heart failure situation.    Marlo  has a past medical history of Acute renal failure (H); MEGHA (acute kidney injury) (H); Aphasia; AV malformation, acquired (H); AVM (arteriovenous  malformation) brain; Bipolar affective (H); Borderline personality disorder; Cerebral edema (H); Elevated glucose; Encephalopathy; Encephalopathy; Headache; Hemiparesis (H); Hypertension; Intraventricular hemorrhage (H); Morbid obesity (H); Pneumonia; Polysubstance abuse; Polysubstance abuse; PTSD (post-traumatic stress disorder); Renal failure; Stroke (H); Suicidal ideation; and UTI (urinary tract infection).  Marlo  has a past surgical history that includes Ovarian cyst removal; kidney stone removal; and Tonsillectomy.  Fish containing products; Ancef [cefazolin]; Keflex [cephalexin]; and Sulfa (sulfonamide antibiotics)  Current Outpatient Prescriptions   Medication Sig Dispense Refill     acetaminophen (TYLENOL) 500 MG tablet Take 500 mg by mouth 2 times a day at 6:00 am and 4:00 pm.       amLODIPine (NORVASC) 10 MG tablet Take 10 mg by mouth.       ARIPiprazole (ABILIFY) 5 MG tablet Take 1 tablet (5 mg total) by mouth daily. 30 tablet 6     famotidine (PEPCID) 20 MG tablet Take 20 mg by mouth 2 (two) times a day.        folic acid (FOLVITE) 1 MG tablet Take 1 mg by mouth.       furosemide (LASIX) 20 MG tablet Take 1 tablet by mouth twice daily.       hydroCHLOROthiazide (HYDRODIURIL) 25 MG tablet Take 25 mg by mouth daily.       hydroCHLOROthiazide (MICROZIDE) 12.5 mg capsule        lamoTRIgine (LAMICTAL) 100 MG tablet        lamoTRIgine (LAMICTAL) 150 MG tablet Take 150 mg by mouth 2 (two) times a day.       levETIRAcetam (KEPPRA) 500 MG tablet        menthol-zinc oxide (RISAMINE) 0.44-20.6 % Oint ointment Rash as needed       metoprolol tartrate (LOPRESSOR) 50 MG tablet Take 50 mg by mouth 2 (two) times a day.       OLANZapine (ZYPREXA) 2.5 MG tablet TAKE 1 TABLET BY MOUTH THREE TIMES A DAY AS NEEDED FOR AGITATION 90 tablet 3     polyethylene glycol (MIRALAX) 17 gram packet Take 17 g by mouth daily.       venlafaxine (EFFEXOR-XR) 37.5 MG 24 hr capsule Take 1 capsule (37.5 mg total) by mouth daily. 30 capsule 6      No current facility-administered medications for this encounter.      No family history on file.  Social History     Social History     Marital status: Single     Spouse name: N/A     Number of children: N/A     Years of education: N/A     Social History Main Topics     Smoking status: Former Smoker     Smokeless tobacco: Not on file     Alcohol use Yes     Drug use: Yes     Special: Methamphetamines, Cocaine     Sexual activity: Not Currently     Other Topics Concern     Not on file     Social History Narrative     No narrative on file       The following portions of the patient's history were reviewed and updated as appropriate: allergies, current medications, past family history, past medical history, past social history, past surgical history and problem list.    Review of Systems  Constitutional: negative  Neurological: negative for involuntary movements  Behavioral/Psych: positive for anxiety, depression, mood swings and nightmares       Objective:   @VS    Mental Status Exam:     Appearance: She does casually dressed, pleasant and cooperative.  She arrives to the appointment on time.  Good eye contact no psychomotor agitation.    Speech / Language : Within normal    Associations: appropriate    Alert and oriented X 3:yes    Mood: Anxious  Affect: Congruent w/content of speech    Suicidal / Homicidal ideation:none  If yes, document safety plan:    Fund of knowledge: fair    Thought process:Within normal    Judgement / insight: Impairment    Recent and remote memory: Baseline impaired.    Attention: Within normal  Concentration: Within normal    Motor status: Gait normal, no involuntary movements.    Scores: NA    Change in medication? No    Education    Reviewed risks/benefits of medication      Physical Exam: General appearance: alert, appears stated age, cooperative, no distress and morbidly obese  Neurologic: Mental status: Alert, oriented, thought content appropriate, affect: mood-congruent, thought  content exhibits logical connections.Thought content devoid of any delusions, suicidal, homicidal or obsesional content.

## 2021-06-19 NOTE — PROGRESS NOTES
Patient's impression of how medication is working? Pt said that everything has been going well and that she has ran out of medication which makes her not feel very well.  She is saying that she has been very irritable lately since running out of medications.     Compliant with Medication? Sometimes.     Side Effects: None    Current Symptoms : Yes    Pain (0-10) Yes  Appetite change No  Sleep disturbance No  Change in energy No  Change in interest No  Change in concentration No  Psychosis/Hallucinations No  Negative thoughts No  Mood swings Yes  Alcohol use No  Drug use No  Anxiety moderate  Sad/depressed mood high

## 2021-06-20 NOTE — PROGRESS NOTES
[facility] Hospitalist Daily Progress Note    Summary  Marlo Kearns is a 37 y.o. 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 Wound was cleaned at bedside . Mri done and didn't show  septic arthropathy, or abscess. Vascular  studies with normal thomas and right great toe pressures adequate for healing . Infectious Disease recommended a combination of cipro flagyl and vancomycin pending cultures( prelim with mssa and Gram negative bacilli).  She underwent debridement by podiatry in the operating room on 9/21/18.   because of her diastolic dysfunction, she was cleared for the procedure beforehand by cardiology. She was transitioned to clindamycin prior to transfer.     Postoperatively, she is strict non weight bearing right lower extremity. Her CRP has decreased from 15.36 at admission to 5.90 on 9/22/18. She was transferred to [facility] on 9/26/18.    Assessment/Plan  Right foot infection S/p debridement by podiatry in OR on 9/21  -woc following on two times a day dressing changes  -discussed with ID and ok to stop abx on 10/2 as rash was not improving.    -NWB right foot until seen by podiatry as outpt- ok for weight bearing for bathroom privileges only. Off loading boot for commode transfers  - has appt with vascular center/podiatry on 10/18 with Dr. Kristina Rich, but if still here can be delayed as outpt.     Extensive maculopapular Rash-improving   possibly from antibiotics - clindamycin d/pura and added to allergy list  - cont benadryl scheduled, prednisone and atarax prn, monitor to stop schedule benadryl soon  - cont  current dose of prednisone, end after today. Monitor rash    Left arm cellulitis  Pt with scab on left arm with surrounding cellulitis  -started IV vanco but rash is worsened, vanco dc'ed  -monitor     Diastolic dysfunction  Bradycardia  Echo in 11/2017 with grade 3 diastolic dysfunction, normal EF   -outpt cardiology follow up, stress test as outpt  -Daily weights, I and O  -cont torsemide lopressor, and potassium - adjust as needed    H/o tobacco abuse  H/o chronic alcohol use  No alcohol and cigarettes since admission to [facility] on 9/14. At risk for dependence  -minimize narcotics as able  -nicotine patch prn     Possible COPD/CARMENZA  Has not had sleep study in the past. Does not use o2 at home,  -duoneb four times a day and albuterol prn  -cont diuretics to keep dry      Essential hypertension  -cont norvasc, lopressor     Seizure disorder (H)  -cont keppra and lamictal  -seizures precautions     Borderline personality disorder  -cont abilify, effexor, Zyprexa prn   -psych consult if needed     Hemiplegia (H)  Cerebral artery occlusion with cerebral infarction (H)  Secondary to ruptured aneurysm  -PT/OT  -antivert prn for dizziness     DM2  Hgbaic 5.3 on 9/24,  - does not need insulin now that off steroids.  Monitor fs     Morbid obesity        Code status:  Full Code - d/w patient at bedside. Father can make decisions if pt unable to do so       GI prophylaxis:pepcid  DVT prophylaxis:HSQ           Subjective:  Itching and rash is better today.  Discussed about picc and that we should keep it in for lab draws since she reports that it is difficult for blood draws for her    Objective:  Vital signs in last 24 hours:  Temp:  [98  F (36.7  C)-98.5  F (36.9  C)] 98  F (36.7  C)  Heart Rate:  [60-86] 66  Resp:  [12-20] 12  BP: (114-162)/(55-90) 162/90  Weight:   (!) 378 lb 3.2 oz (171.6 kg)    Intake/Output last 3 shifts:  I/O last 3 completed shifts:  In: 1144 [P.O.:710; I.V.:20; IV Piggyback:414]  Out: 650  [Urine:650]  Intake/Output this shift:  I/O this shift:  In: 630 [P.O.:600; I.V.:30]  Out: -       Physical Exam:  General Appearance: NAD in chair   Throat: Oropharynx is clear, moist oral mucosa.  Lungs: ctab  Heart: Regular rate and rhythm, bilat LE with chronic lymphedema  Abdomen: Soft, non-tender to palpation, nd  Extremities: dressing on right lower ext,  Pulses: DP pulses are 1-2+ bilat.    Skin: macular rash with some papular areas on abd, back, arms, chest, and neck, does not have much on bilat LE and none on face.  Rash has regressed since yesterday and less red  Neuro: alert and oriented x 3, nonfocal       Imaging and Lab Results - I have personally  reviewed the latest labs and imaging test results  Recent Results (from the past 24 hour(s))   POCT Glucose   Result Value Ref Range    Glucose,  mg/dL   POCT Glucose   Result Value Ref Range    Glucose,  mg/dL   POCT Glucose   Result Value Ref Range    Glucose, POC 83 mg/dL   POCT Glucose   Result Value Ref Range    Glucose, POC 92 mg/dL         Lines: picc line right arm    Disposition/Follow up monitoring for rash after stopping prednisone, but consider d/c this week to tcu

## 2021-06-20 NOTE — PROCEDURES
"PICC Line Insertion Procedure Note  Pt. Name: Marlo Kearns  MRN:        [id number]    Procedure: Insertion of a  dual Lumen  5 fr  Bard SOLO (valved) Power PICC, Lot number [id number]    Indications: Antibiotics    Contraindications : none    Procedure Details   Patient identified with 2 identifiers and \"Time Out\" conducted.  .     Central line insertion bundle followed: hand hygeine performed prior to procedure, site cleansed with cholraprep, hat, mask, sterile gloves,sterile gown worn, patient draped with maximum barrier head to toe drape, sterile field maintained.    The vein was assessed and found to be compressible and of adequate size. 3 ml 1% Lidocaine administered sq to the insertion site. A 5 Fr PICC was inserted into the brachial vein of the right arm with ultrasound guidance. 1 attempt(s) required to access vein.   Catheter threaded without difficulty. Good blood return noted.    Modified Seldinger Technique used for insertion.    The 8 sharps that are included in the PICC insertion kit were accounted for and disposed of in the sharps container prior to breakdown of the sterile field.    Catheter secured with Statlock, biopatch and Tegaderm dressing applied.    Findings:  Total catheter length  45 cm, with 1 cm exposed. Mid upper arm circumference is 50 cm. Catheter was flushed with 20 cc NS. Patient  tolerated procedure well.    Tip placement verified by xray. Xray read by Dr. Serna . Tip placement in the SVC.    CLABSI prevention brochure left at bedside.    Patient's primary RN notified PICC is ready for use.    Comments:          Ramiro Driver, RN,BSN  [facility]        "

## 2021-06-20 NOTE — PROGRESS NOTES
Plan: Pt is in active discharge phase, SW will expand TCU search as needed. SW will continue to follow pt and family for psychosocial and emotional support while pt is hospitalized at . SW will help coordinate discharge once pt is medically stable and no longer needing a LTACH level of care. SW will provide resources as needed or requested.    Subjective Data: Met with pt to obtain TCU choices and verified pt's discharge readiness with  WOC Team.     Objective Data: SW met with pt on Monday 10/08/18 and obtained several  TCU choices including the followin) [facility] 2) [facility] 3) [facility] 4) [facility] 5) [facility] 6) [facility] 7) [facility]. SW spoke with  WOC Team on Tuesday following pt's treatment by Dr. Casanova (Plastic Surgery), WOC Team changed pt's wound care to two times a day acetic acid. Pt is cleared to discharge to a lower level of cares at this time. SW placed referrals to the above listed TCU's.     Barriers to Discharge  1) Complex wound care  2) Placement

## 2021-06-20 NOTE — CONSULTS
Plastic Surgery Consultation        Problem:  Principal Problem:    Right foot infection  Active Problems:    Cerebral artery occlusion with cerebral infarction (H)    Borderline personality disorder (H)    Essential hypertension    Hemiplegia (H)    Physical deconditioning    Diastolic dysfunction    Seizure disorder (H)    Foot infection      History of Present Illness:   Marlo Kearns is a 37 y.o. 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 Wound was cleaned at bedside . Mri done and didn't show  septic arthropathy, or abscess. Vascular  studies with normal thomas and right great toe pressures adequate for healing . Infectious Disease recommended a combination of cipro flagyl and vancomycin pending cultures( prelim with mssa and Gram negative bacilli).  She underwent debridement by podiatry in the operating room on 9/21/18.   because of her diastolic dysfunction, she was cleared for the procedure beforehand by cardiology. She was transitioned to clindamycin prior to transfer.     Postoperatively, she is strict non weight bearing right lower extremity. Her CRP has decreased from 15.36 at admission to 5.90 on 9/22/18. She was transferred to [facility] on 9/26/18.      Past Medical History:   Diagnosis Date     Acute renal failure (H)      MEGHA (acute kidney injury) (H)      Aphasia      AV malformation, acquired (H)      AVM (arteriovenous malformation) brain     left, ruptured      Bipolar affective (H)      Borderline personality disorder      Cerebral edema (H)      Elevated glucose      Encephalopathy      Encephalopathy      Headache      Hemiparesis (H)      Hypertension      Intraventricular hemorrhage (H)      Morbid obesity (H)      Pneumonia      Polysubstance abuse      Polysubstance abuse      PTSD (post-traumatic stress disorder)      Renal failure      Stroke (H)      Suicidal ideation      UTI (urinary tract infection)      Past Surgical History:   Procedure Laterality Date     kidney stone removal       OVARIAN CYST REMOVAL       PICC  9/29/2018          TONSILLECTOMY       No family history on file.    Social History     Social History     Marital status: Single     Spouse name: N/A     Number of children: N/A     Years of education: N/A     Occupational History     Not on file.     Social History Main Topics     Smoking status: Former Smoker     Smokeless tobacco: Not on file     Alcohol use Yes     Drug use: Yes     Special: Methamphetamines, Cocaine     Sexual activity: Not Currently     Other Topics Concern     Not on file     Social History Narrative            Medication List      ASK your doctor about these medications          acetaminophen 500 MG tablet   Commonly known as:  TYLENOL       amLODIPine 10 MG tablet   Commonly known as:  NORVASC       ARIPiprazole 5 MG tablet   Commonly known as:  ABILIFY   Take 1 tablet (5 mg total) by mouth daily.       clindamycin 150 MG capsule   Commonly known as:  CLEOCIN       collagenase ointment       famotidine 20 MG tablet   Commonly known as:  PEPCID       folic acid 1 MG tablet   Commonly known as:  FOLVITE       Lactobacillus rhamnosus GG 15 billion cell Cpsp   Commonly known as:  CULTURELLE       lamoTRIgine 150 MG tablet   Commonly known as:  LaMICtal       levETIRAcetam 500 MG tablet   Commonly known as:  KEPPRA       meclizine 25 mg tablet   Commonly known as:  ANTIVERT       metoprolol tartrate 50 MG tablet   Commonly  known as:  LOPRESSOR       OLANZapine 2.5 MG tablet   Commonly known as:  zyPREXA   TAKE 1 TABLET BY MOUTH THREE TIMES A DAY AS NEEDED FOR AGITATION       oxyCODONE-acetaminophen 5-325 mg per tablet   Commonly known as:  PERCOCET/ENDOCET       potassium chloride 20 MEQ tablet   Commonly known as:  K-DUR,KLOR-CON       torsemide 20 MG tablet   Commonly known as:  DEMADEX       venlafaxine 37.5 MG 24 hr capsule   Commonly known as:  EFFEXOR-XR   Take 1 capsule (37.5 mg total) by mouth daily.               Allergies  Allergies   Allergen Reactions     Fish Containing Products Anaphylaxis     Can eat seafood or salt water, but reacts to freshwater fish.  Anaphylaxis        Banana Itching     Clindamycin      rash     Ancef [Cefazolin] Rash     Rash       Keflex [Cephalexin] Rash     Rash      Sulfa (Sulfonamide Antibiotics) Rash     Rash        Review of Systems:  Constitutional: negative for chills, fatigue, fevers and night sweats  Eyes: negative  Ears, nose, mouth, throat, and face: negative for hearing loss, hoarseness, snoring, sore throat and tinnitus  Respiratory: negative for shortness of breath or cough  Cardiovascular: negative for chest pain, chest pressure/discomfort, dyspnea, exertional chest pressure/discomfort, irregular heart beat and lower extremity edema  Gastrointestinal: negative for abdominal pain, constipation, diarrhea, melena and vomiting  Genitourinary:negative for dysuria and hematuria  Integument/breast: negative for pruritus and rash  Hematologic/lymphatic: negative for bleeding  Musculoskeletal:negative for arthralgias, back pain, myalgias and neck pain  Neurological: negative for dizziness, headaches, paresthesia, seizures and tremors  Behavioral/Psych: negative for anxiety, depression and irritability      Labs:  No results for input(s): CRP, HGBA1C, LABPRE, ALBUMIN in the last 72 hours.    Invalid input(s): CDIFF, HEM    Vitals:   The last obtained vitals are: Temp: 97.7  F (36.5   C)  Heart Rate: 81  Resp: 18  BP: (!) 140/95      Chidi:  Chidi Scale Score: 17  (!) 362 lb 14.4 oz (164.6 kg)  Body mass index is 66.38 kg/(m^2).  Specialty Bed: [facility] 10A/[facility] 1000      Output:  Urine Occurrence: 1  Urine: 400 mL     Stool Occurrence: 1          Physical Exam;  General Appearance:  Appears comfortable, Alert, cooperative, no distress,   Head: Normocephalic, without obvious abnormality, atraumatic  Eyes: PERRL, conjunctiva/corneas clear, EOM's intact, both eyes   Nose: Nares normal, no drainage   Throat: Lips, mucosa, and tongue normal; teeth and gums normal  Neck: Supple, symmetrical, trachea midline, no adenopathy;    Lungs: Clear to auscultation bilaterally, respirations unlabored  Chest Wall: No tenderness or deformity  Heart: Regular rate and rhythm, S1 and S2 normal, no murmur, rubs or gallop  Abdomen: Soft, non-tender, bowel sounds active all four quadrants,   no masses, no organomegaly  Extremities: Extremities normal, atraumatic, no cyanosis or edema  Pulses: DP pulses are 1-2+ bilat.    Skin: no rashes or lesions  Neurologic: normal and equal strength bilat in upper and lower extremities      Wound Ostomy Continence Assessment/Plan:  Wound 09/26/18 Fissure Abdomen Right;Left;Medial (center/inner) (Active)   Site Assessment Hortense 10/9/2018  1:00 AM   Surrounding Tissue Assessment Clean;Dry;Intact 10/9/2018  1:00 AM   Drainage Amount Scant 10/5/2018  8:35 PM   Drainage Description Serosanguineous 10/5/2018  8:35 PM   Site Care Cleansed 10/8/2018  7:52 PM   Dressing Other (Comment) 10/9/2018  1:00 AM   Dressing Status  Intact 10/9/2018  1:00 AM       Wound 09/26/18 Foot Right;Posterior (back) (Active)   Site Assessment Pink;Red 10/9/2018  6:15 AM   Surrounding Tissue Assessment Intact;Fragile;Induration (firm);Dry 10/9/2018  6:15 AM   Drainage Amount Scant 10/9/2018  6:15 AM   Drainage Description Serous 10/9/2018  6:15 AM   Site Care Cleansed;Packing 10/9/2018  6:15 AM   Dressing Moist  gauze 10/9/2018  6:15 AM   Dressing Status  Dressing changed 10/9/2018  6:15 AM       Wound 09/26/18 Laceration Arm Right;Anterior (front) (Active)   Site Assessment Covered, not assessed 10/9/2018  1:00 AM   Surrounding Tissue Assessment Covered, not assessed 10/9/2018  1:00 AM   Drainage Amount Scant 10/6/2018  9:00 PM   Drainage Description Serosanguineous 10/6/2018  9:00 PM   Site Care Cleansed 10/6/2018  9:00 PM   Dressing Foam 10/9/2018  1:00 AM   Dressing Status  Intact 10/9/2018  1:00 AM     Pt has a non healing surgical wound to the right plantar foot.  There is slough at the base.  There is no erythema or pus.  There is no bone and tendon exposed.  The dressings were all removed.  Using a pickup and scalpel, excisional debridement of the non healing surgical wound to the right plantar foot was done. Excised 1x2 cm of necrotic sq to healthy bleeding tissue. Devitalized and non-viable tissue were removed to improve granulation tissue formation, stimulate wound healing, decrease bacteria load, decrease wound edge senescence and disrupt biofilm formation. Patient tolerated procedure well. Wound appears /healther after debridement.   Hemostasis was well obtained.  She tolerated it well and no complications were noted. Dresssings were reapplied. Continue wet to moist dressings.    Thank you Latonya De La Rosa MD for this consult, I will continue to follow.    Alban Casanova MD

## 2021-06-20 NOTE — PROGRESS NOTES
[facility] Hospitalist Daily Progress Note    Summary  Marlo Kearns is a 37 y.o. 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 Wound was cleaned at bedside . Mri done and didn't show  septic arthropathy, or abscess. Vascular  studies with normal thomas and right great toe pressures adequate for healing . Infectious Disease recommended a combination of cipro flagyl and vancomycin pending cultures( prelim with mssa and Gram negative bacilli).  She underwent debridement by podiatry in the operating room on 9/21/18.   because of her diastolic dysfunction, she was cleared for the procedure beforehand by cardiology. She was transitioned to clindamycin prior to transfer.     Postoperatively, she is strict non weight bearing right lower extremity. Her CRP has decreased from 15.36 at admission to 5.90 on 9/22/18. She was transferred to [facility] on 9/26/18.    Assessment/Plan  Right foot infection  Wound culture: mostly MSSA. Also has Anaerobic gram positive cocci. Tissue cx with corynebcterium. Stepped on glass few weeks ago; no foreign bodies on imaging. No osteomyelitis per imaging (MRI). Vascular  studies with normal thomas and right great toe pressures adequate for healing . Initially cipro flagyl and vancomycin. S/p debridement by podiatry in OR on 9/21, antibiotics changed to clindamycin. Wound vac placed  -woc following  -Pt was put on clindamycin 300 four times a day to be continued for 7 days from 9/26. Pt developed a rash on 9/29/18 and clindamycin d/pura and started IV vanco ( D/w ID) end date possibly 10/3  -pain meds -  tylenol prn, percocet 1 - 2 tab q 4 prn  -dakins  -NWB right foot - ok for weight bearing for bathroom privileges only. Off loading boot for commode transfers  -ID consult       Rash  Pt developed a new gen rash on back and chest, itching, possibly from antibiotics - clindamycin d/pura and added to allergy list  -benadryl po - changed to scheduled  -started prednisone 40 mg daily X 3 days on 10/1 - Pt says causes anxiety, but agreeable to try  -topical benadryl prn      Left arm cellulitis  Pt with scab on left arm with surrounding cellulitis  -started IV vanco  -monitor        Diastolic dysfunction  Bradycardia  Echo in 11/2017 with grade 3 diastolic dysfunction, normal EF   -outpt cardiology follow up   -Daily weights, I and O  -cont torsemide and potassium - adjust as needed  -follow electrolytes  -stress test as outpt  -on lopressor - dose reduced and hold parameters in place     H/o tobacco abuse  H/o chronic alcohol use  No alcohol and cigarettes since admission to [place] on 9/14. At risk for dependence  -minimize narcotics as able  -nicotine patch prn     Shortness of breath  Hypoxia  Possible COPD/CARMENZA  Has not had sleep study in the past. Does not use o2 at home,  -o2 as needed  -duoneb four times a day and albuterol prn  -cont diuretics  -cxr on 9/27 with edema     Essential hypertension  -cont norvasc, lopressor     Seizure disorder (H)  -cont keppra and lamictal  -seizures precautions     Borderline personality disorder  -cont abilify, effexor  -zyprexa prn  -psych consult if needed     Hemiplegia (H)  Cerebral artery occlusion with cerebral infarction (H)  Secondary to ruptured aneurysm  -PT/OT  -antivert prn for dizziness     DM  Hgbaic 5.3 on 9/24, sugars stable, has not needed insulin, but now starte don prednisone on 10/1  -cont glucose checks - SSI  -hypoglycemia protocol     Morbid obesity  -bariatric equipment  -skin cares - woc to follow - micotin three times a day and prn, aquaphor,  calmoseptine  -cont folvite      Code status:  Full Code - d/w patient at bedside. Father can make decisions if pt unable to do so       GI prophylaxis:pepcid, zofran prn, tums prn  DVT prophylaxis:HSQ  Bowel regiment: dulcolax prn, culturelle bid         Subjective:  Pt says doing ok, rash continues to itch, more redness    Objective:  Vital signs in last 24 hours:  Temp:  [97.9  F (36.6  C)-98.8  F (37.1  C)] 97.9  F (36.6  C)  Heart Rate:  [68-90] 90  Resp:  [18-20] 20  BP: (108-152)/(53-75) 152/75  Weight:   (!) 386 lb 3.2 oz (175.2 kg)    Intake/Output last 3 shifts:  I/O last 3 completed shifts:  In: 240 [P.O.:240]  Out: -   Intake/Output this shift:         Physical Exam:  General Appearance:  appears comfortable.   Alert, cooperative, no distress,   Throat: Oropharynx is clear, moist oral mucosa.   Lungs: Clear to auscultation bilaterally, respirations unlabored.  Heart: Regular rate and rhythm, S1 and S2 normal, no murmur, rubs or gallop, bilat lower extremity edema.  Abdomen: Soft, non-tender to palpation, bowel sounds present.  Extremities: dressing on right lower ext, scab on right arm with surrounding cellultits  Pulses: DP pulses are 1-2+ bilat.    Skin: rash on chest, back  Neuro: AAO X 3, mild right sided weakness, difficulty  Moving right leg due to pain       Imaging and Lab Results - I have personally  reviewed the latest labs and imaging test results    CXR 9/27/18  FINDINGS: Heart and mediastinal size are within normal limits given AP portable technique. There is indistinctness of the pulmonary interstitium, compatible with edema. No lobar infiltrate. No obvious pleural effusion or pneumothorax.    Lines: picc line right arm    Disposition/Follow up pending    Aura Preston MD, MPH  [facility]

## 2021-06-20 NOTE — PROGRESS NOTES
Therapeutic Recreation Evaluation     09/27/18 0930   Time Spent With Patient   Treatment Start Time 930   Treatment End Time 950   Leisure Interests   Leisure Interests Card games;Computer social networking;Crafts/Arts;Movies/TV;Listening to music;Pets;Reading;Shopping   Prior Status   Independent With Leisure/Interests   Indoor Mobility Independent   Lives With Significant other;Family   Vocational On disability   Cognition   Responsiveness Impaired   Attention Impaired   Orientation WFL   Evaluation   Visual AIds None   Arousal/Alertness WFL     Pt was able to provide leisure information. Pt recalled being here a few years ago and participating in music visits and 1:1 therapy.  Leisure materials were reviewed with pt and pt requested a movie list, and coloring supplies.  Pt will be added to music visit list, pet visit list, and scheduled for 1:1 therapy.  Will provide leisure materials as requested.  Hayes Parks, Recreational Therapist Student      Get To Know Me Poster    Occupation: on disability  Important People: dad, sister Maryanne, Shashi - boyfriend, Yonas and Con - 2 boys she helped raise, friends  Pets: enjoys dogs, puppies  Movie: yes  TV Show: yes, crime shows  Book: yes  Music: loves music  Sport/Team: football  Activities/Hobbies: Pets, reading, listening to music, watching movies/tv, computer use, reading newspapers, coloring, cards, loves shopping  Understand Info Best:   Other Things to Know: lives in [facility]

## 2021-06-20 NOTE — PROGRESS NOTES
INFECTIOUS DISEASE FOLLOW UP NOTE    Date: 10/2/2018    ASSESSMENT:  1. Skin and soft tissue infection R foot with MSSA, anaerobes. She has had adequate duration of antibiotics for this.   2. Drug rash likely clindamycin. Now stopped. Persists despite stopping med -- can take time to resolve. Less likely reaction now to vancomycin.  3. Early thrush  4. obesity    PLAN:  Agree with prednisone for rash  Oral nystatin -- add fluconazole if not improving.   Stop vancomycin    Damien Wang MD  [facility]   On-Call: [phone]     ______________________________________________________________________    SUBJECTIVE / INTERVAL HISTORY: rash feels about the same although she notes it is spreading up her neck. No fever. Mouth feels better.     ROS: All other systems negative except as listed above.        OBJECTIVE:  Vitals:    10/02/18 0924   BP: 173/67   Pulse:    Resp:    Temp:    SpO2:           Vital Signs  Temp: 98.1  F (36.7  C)  Temp Source: Oral  Heart Rate: 69  Heart Rate Source: Machine/Monitor  Resp: 18  BP: 173/67  MAP (mmHg) (Calculated): 102  BP Location: Left arm  BP Method: Machine/Monitor  Patient Position: Sitting    Temp (24hrs), Av  F (36.7  C), Min:97.7  F (36.5  C), Max:98.1  F (36.7  C)      GEN: No acute distress.    HEENT: thrush, angular cheilitis  RESPIRATORY:  Normal breathing pattern.   CARDIOVASCULAR:  Regular rate and rhythm. Normal S1 and S2. No murmur, click, gallop or rub.   ABDOMEN:  Soft, normal bowel sounds, obese  EXTREMITIES: No edema.  SKIN/HAIR/NAILS:  Diffused maculopapular rash on trunk, neck, arms, thighs.   Wound R foot not viewed today.   IV: PICC        Antibiotics:  last clindamycin   Vancomycin -    Pertinent labs:      Results from last 7 days  Lab Units 10/02/18  0630 18  0643 18  0951 18  0953   LN-WHITE BLOOD CELL COUNT thou/uL  --  7.4  --  6.3   LN-HEMOGLOBIN g/dL  --  11.5*  --  11.9*   LN-HEMATOCRIT %  --   35.6  --  36.5   LN-PLATELET COUNT thou/uL 171 176 181 215        Results from last 7 days  Lab Units 10/02/18  0630 10/01/18  0742 09/30/18  0643  09/28/18  0706  09/27/18  0953   LN-SODIUM mmol/L  --   --  136  --  140  --  139   LN-POTASSIUM mmol/L 4.3 3.7 4.5  < > 4.0  4.0  < > 3.1*   LN-CHLORIDE mmol/L  --   --  98  --  99  --  93*   LN-CO2 mmol/L  --   --  27  --  30  --  36*   LN-BLOOD UREA NITROGEN mg/dL 11  --  11  --  10  --  11   LN-CREATININE mg/dL 0.70  --  0.73  --  0.69  --  0.75   LN-CALCIUM mg/dL  --   --  9.3  --  9.2  --  9.7   < > = values in this interval not displayed.   No results found for: CRP   No results found for: SEDRATE    Lab Results   Component Value Date    ALT 18 09/27/2018    AST 24 09/27/2018    ALKPHOS 48 09/27/2018    BILITOT 0.5 09/27/2018         MICROBIOLOGY DATA:  Reviewed from [facility]    RADIOLOGY:  Xr Chest 1 View Portable    Result Date: 9/27/2018  XR CHEST 1 VIEW PORTABLE 9/27/2018 8:20 AM INDICATION: SOB. COMPARISON: None. FINDINGS: Heart and mediastinal size are within normal limits given AP portable technique. There is indistinctness of the pulmonary interstitium, compatible with edema. No lobar infiltrate. No obvious pleural effusion or pneumothorax.     Xr Chest 1 View For Picc Placement Portable    Result Date: 9/29/2018  XR CHEST 1 VIEW FOR PICC LINE PLACEMENT PORTABLE 9/29/2018 8:08 PM INDICATION: Verify catheter placement COMPARISON: 09/27/2018 FINDINGS: PICC line in the SVC. Chest otherwise negative.

## 2021-06-20 NOTE — PROGRESS NOTES
[place] [place] Hospitalist Daily Progress Note    Summary  Marlo Kearns is a 37 y.o. 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 Wound was cleaned at bedside . Mri done and didn't show  septic arthropathy, or abscess. Vascular  studies with normal thomas and right great toe pressures adequate for healing . Infectious Disease recommended a combination of cipro flagyl and vancomycin pending cultures( prelim with mssa and Gram negative bacilli).  She underwent debridement by podiatry in the operating room on 9/21/18.   because of her diastolic dysfunction, she was cleared for the procedure beforehand by cardiology. She was transitioned to clindamycin prior to transfer.     Postoperatively, she is strict non weight bearing right lower extremity. Her CRP has decreased from 15.36 at admission to 5.90 on 9/22/18. She was transferred to [facility] on 9/26/18.    Assessment/Plan  Right foot infection S/p debridement by podiatry in OR on 9/21  -woc following on two times a day dressing changes  -discussed with ID and ok to stop abx on 10/2 as rash is not improving.    -NWB right foot - ok for weight bearing for bathroom privileges only. Off loading boot for commode transfers    Extensive maculopapular Rash  Pt developed a new gen rash on back and chest, itching, possibly from antibiotics - clindamycin d/pura and added to allergy list  - cont benadryl scheduled, d/c vanco, add atarax prn  - cont current dose of prednisone.  Patient is reluctant to increase dose.  She understands if the rash is worsening we will have to switch IV steroids  and increase dose by tomorrow.    Left arm cellulitis  Pt with scab on left arm with surrounding cellulitis  -started IV vanco but rash is worsened, will d/c vanco and monitor area.    -monitor     Diastolic dysfunction  Bradycardia  Echo in 11/2017 with grade 3 diastolic dysfunction, normal EF   -outpt cardiology follow up, stress test as outpt  -Daily weights, I and O  -cont torsemide lopressor, and potassium - adjust as needed    H/o tobacco abuse  H/o chronic alcohol use  No alcohol and cigarettes since admission to [facility] on 9/14. At risk for dependence  -minimize narcotics as able  -nicotine patch prn     Shortness of breath  Hypoxia  Possible COPD/CARMENZA  Has not had sleep study in the past. Does not use o2 at home,  -o2 as needed  -duoneb four times a day and albuterol prn  -cont diuretics to keep dry      Essential hypertension  -cont norvasc, lopressor     Seizure disorder (H)  -cont keppra and lamictal  -seizures precautions     Borderline personality disorder  -cont abilify, effexor  -zyprexa prn  -psych consult if needed     Hemiplegia (H)  Cerebral artery occlusion with cerebral infarction (H)  Secondary to ruptured aneurysm  -PT/OT  -antivert prn for dizziness     DM2  Hgbaic 5.3 on 9/24,  - start on nph with steroids.       Morbid obesity        Code status:  Full Code - d/w patient at bedside. Father can make decisions if pt unable to do so       GI prophylaxis:pepcid  DVT prophylaxis:HSQ           Subjective:  Patient reports that she continues to have itching.  Does not notice any improvement with the prednisone.  Rash have seem to spread slightly per her report.  She does not want to increase the prednisone further as this causes her quite a bit of anxiety.    Objective:  Vital signs in last 24 hours:  Temp:  [97.7  F (36.5  C)-98.1  F (36.7  C)] 98.1  F (36.7  C)  Heart Rate:  [60-71] 69  Resp:  [16-18] 18  BP: (106-173)/(51-92) 173/67  Weight:   (!) 387 lb 11.2 oz (175.9 kg)    Intake/Output last  3 shifts:  I/O last 3 completed shifts:  In: 1036 [P.O.:390; I.V.:20; IV Piggyback:626]  Out: 575 [Urine:575]  Intake/Output this shift:  I/O this shift:  In: 120 [P.O.:120]  Out: -       Physical Exam:  General Appearance: NAD in bed  Throat: Oropharynx is clear, moist oral mucosa. No ulcers noted  Lungs: ctab  Heart: Regular rate and rhythm, bilat LE with chronic lymphedema  Abdomen: Soft, non-tender to palpation, bowel sounds present.  Extremities: dressing on right lower ext, scab on right arm with surrounding cellultits  Pulses: DP pulses are 1-2+ bilat.    Skin: extensive macular rash with some papular areas on abd, back, arms, chest, and neck, does not have much on bilat LE and none on face.  No sloughing of skin noted  Neuro: alert and oriented x 3      Imaging and Lab Results - I have personally  reviewed the latest labs and imaging test results  Recent Results (from the past 24 hour(s))   POCT Glucose   Result Value Ref Range    Glucose,  mg/dL   Vancomycin (Vancocin )   Result Value Ref Range    Vancomycin 20.5 <=25.0 ug/mL   POCT Glucose   Result Value Ref Range    Glucose,  mg/dL   POCT Glucose   Result Value Ref Range    Glucose,  mg/dL   Potassium   Result Value Ref Range    Potassium 4.3 3.5 - 5.0 mmol/L   Creatinine   Result Value Ref Range    Creatinine 0.70 0.60 - 1.10 mg/dL    GFR MDRD Af Amer >60 >60 mL/min/1.73m2    GFR MDRD Non Af Amer >60 >60 mL/min/1.73m2   Blood Urea Nitrogen (BUN)   Result Value Ref Range    BUN 11 8 - 22 mg/dL   Platelet Count - every other day x 3   Result Value Ref Range    Platelets 171 140 - 440 thou/uL   POCT Glucose   Result Value Ref Range    Glucose, POC 91 mg/dL         Lines: picc line right arm    Disposition/Follow up pending

## 2021-06-20 NOTE — PROGRESS NOTES
[place] Hospitalist Daily Progress Note    Summary  Marlo Kearns is a 37 y.o. 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 Wound was cleaned at bedside . Mri done and didn't show  septic arthropathy, or abscess. Vascular  studies with normal thomas and right great toe pressures adequate for healing . Infectious Disease recommended a combination of cipro flagyl and vancomycin pending cultures( prelim with mssa and Gram negative bacilli).  She underwent debridement by podiatry in the operating room on 9/21/18.   because of her diastolic dysfunction, she was cleared for the procedure beforehand by cardiology. She was transitioned to clindamycin prior to transfer.     Postoperatively, she is strict non weight bearing right lower extremity. Her CRP has decreased from 15.36 at admission to 5.90 on 9/22/18. She was transferred to [place] on 9/26/18.    Assessment/Plan  Right foot infection  Wound culture: mostly MSSA. Also has Anaerobic gram positive cocci. Tissue cx with corynebcterium. Stepped on glass few weeks ago; no foreign bodies on imaging. No osteomyelitis per imaging (MRI). Vascular  studies with normal thomas and right great toe pressures adequate for healing . Initially cipro flagyl and vancomycin. S/p debridement by podiatry in OR on 9/21, antibiotics changed to clindamycin. Wound vac placed  -woc following  -Pt was put on clindamycin 300 four times a day to be continued for 7 days from 9/26. Pt developed a rash on 9/29/18 and clindamycin d/pura and started IV vanco ( D/w ID)  -pain meds - tylenol prn, percocet 1 -  2 tab q 4 prn  -dakins  -NWB right foot - ok for weight bearing for bathroom privileges only. Off loading boot for commode transfers  -ID consult       Rash  Pt developed a new gen rash on back and chest, itching, possibly from antibiotics - clindamycin d/pura and added to allergy list  -benadryl po prn  -topical benadryl prn  -con consider prednisone if needed    Left arm cellulitis  Pt with scab on left arm with surrounding cellulitis  -started IV vanco  -monitor        Diastolic dysfunction  Bradycardia  Echo in 11/2017 with grade 3 diastolic dysfunction, normal EF   -outpt cardiology follow up   -Daily weights, I and O  -cont torsemide and potassium -  -follow electrolytes  -stress test as outpt  -on lopressor - dose reduced and hold parameters in place     H/o tobacco abuse  H/o chronic alcohol use  No alcohol and cigarettes since admission to [facility] on 9/14. At risk for dependence  -minimize narcotics as able  -nicotine patch prn     Shortness of breath  Hypoxia  Possible COPD/CARMENZA  Has not had sleep study in the past. Does not use o2 at home,  -o2 as needed  -duoneb four times a day and albuterol prn  -cont diuretics  -cxr on 9/27 with edema     Essential hypertension  -cont norvasc, lopressor     Seizure disorder (H)  -cont keppra and lamictal  -seizures precautions     Borderline personality disorder  -cont abilify, effexor  -zyprexa prn  -psych consult if needed     Hemiplegia (H)  Cerebral artery occlusion with cerebral infarction (H)  Secondary to ruptured aneurysm  -PT/OT  -antivert prn for dizziness     DM  Hgbaic 5.3 on 9/24, sugars stable, has not needed insulin  -d/c blood glucose checks  -hypoglycemia protocol     Morbid obesity  -bariatric equipment  -skin cares - woc to follow - micotin three times a day and prn, aquaphor, calmoseptine  -cont folvite      hypokalemia  Due to diuretic use  -d/c K protocol  -increased K to 30meq BID    Code status:  Full Code - d/w patient at bedside. Father can make  decisions if pt unable to do so     Other meds: narcan  GI prophylaxis:pepcid, zofran prn, tums prn  DVT prophylaxis:HSQ  Bowel regiment: dulcolax prn, culturelle bid         Subjective:  Pt says doing ok, gen rash on chest and back    Objective:  Vital signs in last 24 hours:  Temp:  [97.6  F (36.4  C)-97.9  F (36.6  C)] 97.6  F (36.4  C)  Heart Rate:  [62-73] 73  Resp:  [18-22] 20  BP: ()/(49-62) 130/62  Weight:   (!) 380 lb 6.4 oz (172.5 kg)    Intake/Output last 3 shifts:  I/O last 3 completed shifts:  In: 988 [P.O.:988]  Out: -   Intake/Output this shift:         Physical Exam:  General Appearance:  appears comfortable.   Alert, cooperative, no distress,   Throat: Oropharynx is clear, moist oral mucosa.   Lungs: Clear to auscultation bilaterally, respirations unlabored.  Heart: Regular rate and rhythm, S1 and S2 normal, no murmur, rubs or gallop, bilat lower extremity edema.  Abdomen: Soft, non-tender to palpation, bowel sounds present.  Extremities: dressing on right lower ext, scab on right arm with surrounding cellultits  Pulses: DP pulses are 1-2+ bilat.    Skin: No rashes or lesions.  Neuro: AAO X 3, mild right sided weakness, difficulty  Moving right leg due to pain       Imaging and Lab Results - I have personally  reviewed the latest labs and imaging test results    CXR 9/27/18  FINDINGS: Heart and mediastinal size are within normal limits given AP portable technique. There is indistinctness of the pulmonary interstitium, compatible with edema. No lobar infiltrate. No obvious pleural effusion or pneumothorax.    Disposition/Follow up pending    Aura Preston MD, MPH  [facility]

## 2021-06-20 NOTE — PROGRESS NOTES
Plan:  will continue to follow and monitor pt's progression and partner with pt and family to ensure a safe and appropriate discharge plan is in place.     Subjective Data: Pt was awake, alert and sitting in hospital wheelchair. Pt participated fully during CPM, and asked pertinent questions regarding post discharge needs. Pt's Shashi bauman was present and supportive of pt during CPM.     Objective Data: A Care Progression Meeting was held on Thursday 10/04/18.  staff present: Dr. De La Rosa , PT Kushal Roberts, RD Jimena Patrick, OT Lyla Jhaveri and [facility] Maryellen Blunt, Update Notes provided by: Infectious Disease MD.        MD report: Reviewed course of illness and current condition. Pt has wounds which continue to heal, however they still are requiring complex wound cares. Wound bed remains unhealthy granulation tissue which pale shiny granulation with visible bio film. Moderate viscous yellow draiange VAC on hold due to high wound bed bio-burden. Leonarda-wound calloused skin peeling. Treatment Plan: Continue BID Dakins moistened gauze. MD feels pt is stable from a medical perspective, likely discharge early next week.     ID report: ASSESSMENT: 1. Skin and soft tissue infection R foot with MSSA, anaerobes. She has had adequate duration of antibiotics for this. 2. Drug rash likely clindamycin. Now stopped. Improving. 3. Early thrush 4. Obesity. PLAN: Prednisone for rash. Oral nystatin -- add fluconazole if not improving. Now off antibiotics. Remove PICC when no longer needed. ID will sign off.     OT report: Pt working toward independence with upper body ADL's. Pt is able to do approximately 75% of Upper Body ADL's still requiring stand by assist to complete tasks. OT assisting with 25% of task for completion. Pt will be working on Lower Body ADL's this week into next week, OT to introduce adaptive equipment if appropriate. Pt completed MOCA cognitive Test Score: 16/30 indicating deficits in short term memory  and executive functioning. Pt suffered from stroke approximately 3 years ago and may have some residual effects on her cognition. Discharge recommendation: TCU     PT report: PT working with pt to maintaining non-weightbearing status in parallel bars. Pt moves fairly well but PT concerned with pt having stairs in her home to maneuver and maintain NWB through leg. Pt is in agreement with PT's concerns, pt is moving fairly well and able to pivot transfer to commode in room with stand by assist. Attempted ambulation in // bars, pt is unable to ambulate without WB through R LE. Therapist put hand under pt's R foot to asses how much weight was being placed through R foot, and is at least 25% WB. at 10/04 1100.  Discharge recommendation: TCU    SW report: Pt is agreeable to discharge to TCU pt has a good understanding of services offered at TCU. Pt was at [facility] ([facility] in the past and had a good experience there. Pt was provided a Medicare Certified TCU list for review. Pt will review list and offer choices by Monday and SW will place referrals on Monday, pending pt remains medically stable for discharge.     Barriers to Discharge  1) Complex wound care  2) Placement

## 2021-06-20 NOTE — PROGRESS NOTES
"Clinical Nutrition Therapy Reassessment Note    Reason for Reassessment:   Per protocol/pathway.    Subjective:  Pt reported taking Rusty & would be ok with increasing to 2 packets a day for wound healing.    Nutrition History:  Food allergies or intolerances: fish containing products & banana    Current Nutrition Prescription:   Diet: 2 gram Na+  Supplement/Modulars: Rusty daily    Current Nutrition Intake:  Good appetite consuming 100% of meals. Average intake daily the past week ~2000 kcal, 77 grams protein   Rusty provides: 80 kcal, 16.5 grams protein  Meeting >90% of estimated protein needs including all sources.    Anthropometrics:  Height: 5' 2\" (157.5 cm)  Weight: (!) 367 lb 1.6 oz (166.5 kg) (standing on scale),10/9  BMI:67.1  BMI indication: >40 extreme obesity (class 3)  Ideal body weight:110 lb  Weight History:258 lb(4/10/15),360 lb(12/2017),396 lb(9/26/18)  Most recent wt's:386 lb(9/30), 388 lb(10/1),363 lb(10/8)  *overall trending down with diuresing    GI Status/Output:   GI symptoms include:  WDL noted per nursing    Skin/Wound:  Chidi score Chidi Scale Score: 17    Right foot (cellulitis) s/p debridement on 9/21 & 10/9 was noted. WOC note reviewed on 10/6 & improving.    Medications:  Note:Torsemide, KCL, Culturelle  Folic Acid, MVI w/minerals    Labs:  Reviewed.    Estimated Nutrition Needs:  Assessment weight is 82 kg, adjusted weight    Energy Needs: 1687-9100 kcals daily, 20-22 kcal/kg  Protein Needs: 100-125 g daily, 1.2-1.5 g/kg.  Fluid Needs: 2050 mls daily or per doctor, 25 mls/kg    Malnutrition: Not noted    Nutrition Risk Level: moderate-stable risk    Nutrition dx:   Obesity grade 3 r/t lifestyle as evidenced by BMI >41.  Increased protein needs r/t wound healing as evidenced by right plantar surgical foot wound.    Goals:   No wt gain-progressing  Wound healing-in progress    Intervention:  Increase Rusty 1 pkt BID to better meet protein needs for wound healing "     Monitoring/Evaluation:   Labs, wt, po/supplement intake, and wound care notes.      Electronically signed by:  Jimena Patrick RD

## 2021-06-20 NOTE — PROGRESS NOTES
Physical Therapy  Initial Evaluation       09/26/18 1430   Visit Specifics   Eval Type Initial eval   Inital PT Consult 09/26/18   General   Onset date 09/14/18   Additional Pertinent History cutaneous abscess of R foot   Chart Reviewed Yes   PT/OT Patient/Caregiver Stated Goals return home   Family/Caregiver Present No   Precautions   Weight Bearing Status NWB R LE except for stand pivot transfers to commode with FWW.   General Precautions Bed alarm/Tab alarm   Home Living   Type of Home House   Home Layout Multi-level;Stairs to enter with rails;Bed/bath upstairs;Able to live on main level with bedroom/bathroom   Prior Status   Independent With All ADL's/IADL's   Prior Device Use None of the above   Indoor Mobility Independent   Lives With Significant other;Family  (father)   Cognition   Overall Cognitive Status WFL   RLE Assessment   RLE Assessment WFL  (movement restricted d/t pain in R foot and size)   LLE Assessment   LLE Assessment WFL  (movement restricted d/t size)   Bed Mobility   Bed Mobility Rolling;Supine Scoot   Rolling CGA;Stand by assistance   Supine to Sit CGA;Stand by assistance;HOB elevated;Log roll;With rail   Sit to Supine CGA;Stand by assistance;With rail   Supine Scoot Max assist;Mod assist;Assist of 2   Transfer    Sit To Stand Min assist;Assist of 1;Verbal cues;Tactile cues;Elevated surface   Stand To Sit Min assist;Assist of 1;Verbal cues;Tactile cues;Elevated surface   Additional Transfer Commode   Commode Min assist;Assist of 1;Verbal cues;Tactile cues   Transfer Comments pt performs stand pivot to/from commode, unable to maintain NWB restrictions on R foot. MD notified PT that patient can perform stand pivot transfers to commode only with WBAT on R LE.   Wheelchair Activities   Wheelchair Yes   Wheelchair Type Standard;Loaned   Wheelchair Cushion Air floatation;Loaned   Wheelchair Assessment standard wheelchair with cushion requested following evaluation   Balance   Sitting Balance  Standby;CGA;Static;Unsupported;Good head control   Fall Risk   Fall Risk High   Plan   Treatment/Interventions Functional transfer training;Home exercise program;Neuromuscular re-ed;Strengthening/ROM;Wheelchair training   PT Frequency 4-6x/wk   Assessment   Prognosis Good   Problem List Decreased strength;Decreased endurance;Orthopedic restrictions;Pain;Decreased skin integrity;Decreased mobility;Impaired balance   Barriers to Discharge Inaccessible home environment   Recommendation   PT Discharge Recommendation TCU   PT Equipment Recommendation Walker - knee;Wheelchair - standard   Treatment Suggestions for Next Session use knee walker for ambulation       Frequency: 4-6x/week    LTGS to be met by: 10/31/2018  In order to progress towards home,  1. Patient will perform sit to/from stand and stand pivot transfers with mod I.  2. Patient will perform slide board transfers with mod I  3. Patient will ambulate 300ft with mod I and knee walker.  4. Patient will propel manual wheelchair 300 ft with B UE and mod I.    STGs to be met by: 10/17/2018  In order to progress towards LTGs,  1. Patient will perform bed mobility with mod I.  2. Patient will perform sitting balance with mod I.  3. Patient will perform sit to/from stand and stand pivot transfers with CGA.  4. Patient will ambulate 150ft with CGA and knee walker.  5. Patient will propel manual wheelchair 150 feet with B UE and minimum assistance.     Goals were initiated on 9/26/2018 by Neftali Barcenas, PT.

## 2021-06-20 NOTE — PROGRESS NOTES
Occupational Therapy     09/27/18 0700   Visit Specifics   Eval Type Inital eval   Inital OT Consult  09/27/18   General   Onset date 09/14/18   Additional Pertinent History 38 y/o female w/ PMhx of morbid obesity, DM2, stroke, borderline personality, CARMENZA presented to [facility] w/ R foot infection. 9/21/18 when to OR for debridement and podiatry. Strict NWB on RLE.    Chart Reviewed Yes   PT/OT Patient/Caregiver Stated Goals none stated.   Family/Caregiver Present No   Precautions   Weight Bearing Status NWB R LE, ok to stand pivot transfers to commode with FWW.   General Precautions Bed alarm/Tab alarm   Home Living   Type of Home House   Home Layout Multi-level;Bed/bath upstairs;Able to live on main level with bedroom/bathroom;Stairs to enter with rails  (BR on main floor. )   Bathroom Shower/Tub Tub/shower unit   Bathroom Toilet Standard   Bathroom Equipment Shower chair  (Hand held shower head.)   Additional Comments Pt reported no AD for ambulation.    Prior Status   Independent With All ADL's;Cooking;Cleaning;Driving   Indoor Mobility Independent   Lives With Significant other;Family   Vocational On disability   Comments Lives w/ father and fiance.   ADL   Grooming Wash/Dry hands;Wash/dry face   Wash/Dry Hands SBA;Reclined in bed   Wash/Dry Face SBA;Reclined in bed   ADL Comments Limited by pain this a.m. Nsg in w/ pain meds. 6/10 w/ R foot.    Activity Tolerance   Endurance Poor   Activity Tolerance Comments Limited by pain.    Bed Mobility   Bed Mobility Rolling   Rolling Stand by assistance   Bed mobility comments With cues and bending knee to use L foot to push down for supine scoot and roll partially. Pt had increased pain but made good effort.    Behavior    Behavior During Session Cooperative   Vision/Perception   Comments Appeared intact, assess as needed.    RUE Assessment   RUE Assessment WFL  (grossly 4-/5. )   LUE Assessment   LUE Assessment WFL  (grossly 4+/5. )   Hand Function   Gross Grasp  Functional   Gross Motor Coordination Functional   Sensation   Light Touch No apparent deficits   Proprioception No apparent deficits   Assessment   Assessment Decreased ADL status;Decreased funtional mobility;Decreased UE ROM;Decreased UE strength;Decreased endurance;Decreased high-level ADLs   Prognosis Fair   Treatment/Interventions ADL training;Functional transfer training;Cognitive training;Neuromuscular re-ed   OT Frequency Daily;2-3x/wk;5-6x/wk  (ELOS-6 weeks. )   Goal Formulation Patient   Recommendations   OT Discharge Recommendation TCU;Further Therapy Recommended   Treatment Suggestions for Next Session Precaution: NWB RLE wound Tx ideas; MOCA/ACL for baseline cog, UB ADLS, sit EOB, BUE strengthening.    OT Summary Pt seen at bedside. Moaning, nsg in. Reported 6/10 pain in RLE. Pt just getting pain meds. Pt agreeable. Increased BP per nsg due to pain. Pt set up to wash face, declined oral cares. Limited bed mobility due to pain. Presenting w/ deconditioning, low activity tolerance and decreased safety w/ ADLs at this time. Pt will benefit from skilled OT to address deficits.      Edna Almaraz 9/27/2018

## 2021-06-20 NOTE — PROGRESS NOTES
[facility] Hospitalist Daily Progress Note    Summary  Marlo Kearns is a 37 y.o. 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 Wound was cleaned at bedside . Mri done and didn't show  septic arthropathy, or abscess. Vascular  studies with normal thomas and right great toe pressures adequate for healing . Infectious Disease recommended a combination of cipro flagyl and vancomycin pending cultures( prelim with mssa and Gram negative bacilli).  She underwent debridement by podiatry in the operating room on 9/21/18.   because of her diastolic dysfunction, she was cleared for the procedure beforehand by cardiology. She was transitioned to clindamycin prior to transfer.     Postoperatively, she is strict non weight bearing right lower extremity. Her CRP has decreased from 15.36 at admission to 5.90 on 9/22/18. She was transferred to [facility] on 9/26/18.    Assessment/Plan  Right foot infection S/p debridement by podiatry in OR on 9/21, discontinued abx on 10/2 due to rash  -WOC  -NWB right foot until seen by podiatry as outpt- ok for weight bearing for bathroom privileges only. Off loading boot for commode transfers  - has appt with vascular center/podiatry on 10/18 with Dr. Kristina Rich    Extensive maculopapular Rash-improved  Likely due to antibiotics - clindamycin d/pura and added to allergy list.  S/p prednisone.  - cont benadryl scheduled and atarax prn, monitor to stop schedule benadryl soon (likely tomorrow since prednisone just stopped)    Left arm cellulitis  Pt with scab on left arm with surrounding cellulitis,  improving.  -monitor     Diastolic dysfunction  Bradycardia  Echo in 11/2017 with grade 3 diastolic dysfunction, normal EF, wt down to 378lb, O>I by 170ml, BMP ok  -outpt cardiology follow up, stress test as outpt  -Daily weights, I and O  -cont torsemide lopressor, and potassium - adjust as needed    H/o tobacco abuse  H/o chronic alcohol use  No alcohol and cigarettes since admission to [facility] on 9/14. At risk for dependence  -minimize narcotics as able  -nicotine patch prn     Possible COPD/CARMENZA  Has not had sleep study in the past. Does not use o2 at home,  -duoneb four times a day and albuterol prn  -cont diuretics to keep dry      Essential hypertension  Acceptable control overall.  -continue current meds     Seizure disorder (H)  -cont keppra and lamictal  -seizure precautions     Borderline personality disorder  -cont abilify, effexor, Zyprexa prn      Hemiplegia (H)  Cerebral artery occlusion with cerebral infarction (H)  Secondary to ruptured aneurysm  -PT/OT  -antivert prn for dizziness     DM2  Hgbaic 5.3 on 9/24, one high 291 which is not usual and occurred 10/3, now off prednisone  - no more insulin  -accuchecks     Morbid obesity        Code status:  Full Code - d/w patient at bedside. Father can make decisions if pt unable to do so       GI prophylaxis:pepcid  DVT prophylaxis:HSQ    Subjective:  Rash much improved, no more itching, ok with TCU, mouth feels good with no more white coating.    Objective:  Vital signs in last 24 hours:  Temp:  [97.7  F (36.5  C)-98.3  F (36.8  C)] 98  F (36.7  C)  Heart Rate:  [60-73] 62  Resp:  [16-20] 18  BP: (105-165)/(53-84) 111/53  Weight:   (!) 378 lb 3.2 oz (171.6 kg)    Intake/Output last 3 shifts:  I/O last 3 completed shifts:  In: 650 [P.O.:630; I.V.:20]  Out: 950 [Urine:950]  Intake/Output this shift:       Physical Exam:  General Appearance: NAD in chair in common area  Throat: Oropharynx is clear, moist oral mucosa, no evidence of thrus.  Lungs: ctab  Heart:  Regular rate and rhythm, bilat LE with chronic lymphedema  Abdomen: Soft, non-tender to palpation, nd  Extremities: dressing on right lower ext  Skin: Minimal erythematous rash throughout, evidence of scratching  Neuro: alert and oriented x 3, nonfocal       Imaging and Lab Results - I have personally  reviewed the latest labs and imaging test results  Recent Results (from the past 24 hour(s))   POCT Glucose   Result Value Ref Range    Glucose,  mg/dL   POCT Glucose   Result Value Ref Range    Glucose,  mg/dL   Basic Metabolic Panel   Result Value Ref Range    Sodium 139 136 - 145 mmol/L    Potassium 4.1 3.5 - 5.0 mmol/L    Chloride 100 98 - 107 mmol/L    CO2 29 22 - 31 mmol/L    Anion Gap, Calculation 10 5 - 18 mmol/L    Glucose 87 70 - 125 mg/dL    Calcium 9.5 8.5 - 10.5 mg/dL    BUN 18 8 - 22 mg/dL    Creatinine 0.72 0.60 - 1.10 mg/dL    GFR MDRD Af Amer >60 >60 mL/min/1.73m2    GFR MDRD Non Af Amer >60 >60 mL/min/1.73m2   Hemoglobin   Result Value Ref Range    Hemoglobin 11.3 (L) 12.0 - 16.0 g/dL   Magnesium   Result Value Ref Range    Magnesium 2.5 1.8 - 2.6 mg/dL   POCT Glucose   Result Value Ref Range    Glucose, POC 97 mg/dL   POCT Glucose   Result Value Ref Range    Glucose,  mg/dL         Disposition/Follow up:  Medically ready for dc to TCU, search underway, anticipate early next week.    All medication issues identified within the last 24 hours have been addressed and completed as appropriate.    Total time 35 min including Care Progression meeting with rebecca, medical team and patient.  Pt ok with dc to TCU and will look into her choices.  Glad she is off abx, will pull picc when no longer needed for blood draws.    Latonya De La Rosa MD  Internal Medicine  [facility]

## 2021-06-20 NOTE — PROGRESS NOTES
[facility] Hospitalist Daily Progress Note    Summary  Marlo Kearns is a 37 y.o. 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 Wound was cleaned at bedside . Mri done and didn't show  septic arthropathy, or abscess. Vascular  studies with normal thomas and right great toe pressures adequate for healing . Infectious Disease recommended a combination of cipro flagyl and vancomycin pending cultures( prelim with mssa and Gram negative bacilli).  She underwent debridement by podiatry in the operating room on 9/21/18.   because of her diastolic dysfunction, she was cleared for the procedure beforehand by cardiology. She was transitioned to clindamycin prior to transfer.     Postoperatively, she is strict non weight bearing right lower extremity. Her CRP has decreased from 15.36 at admission to 5.90 on 9/22/18. She was transferred to [facility] on 9/26/18.    Assessment/Plan  Right foot infection S/p debridement by podiatry in OR on 9/21, discontinued abx on 10/2 due to rash  -WOC  -NWB right foot until seen by podiatry as outpt- ok for weight bearing for bathroom privileges only. Off loading boot for commode transfers  - has appt with vascular center/podiatry on 10/18 with Dr. Kristina Rich    Diastolic dysfunction  Bradycardia  Echo in 11/2017 with grade 3 diastolic dysfunction, normal EF, wt down to 369lb (last weight 10/6), net positive fluid balance of 1.6 L, torsemide recently increased.  -outpt cardiology follow up, stress test as outpt  -Daily weights, I and O  -continue torsemide 60 mg twice daily  -Continue Lopressor and  potassium  -Recheck BMP in am    H/o tobacco abuse  H/o chronic alcohol use  No alcohol and cigarettes since admission to [facility] on 9/14. At risk for dependence  -minimize narcotics as able  -nicotine patch prn     Possible COPD/CARMENZA  Has not had sleep study in the past. Does not use O2 at home.  -duoneb four times a day and albuterol prn  -cont diuretics to keep dry (see above)     Essential hypertension  Acceptable control overall.  -continue current meds     Seizure disorder (H)  -cont keppra and lamictal  -seizure precautions     Borderline personality disorder  -cont abilify, effexor, Zyprexa prn      Hemiplegia (H)  Cerebral artery occlusion with cerebral infarction (H)  Secondary to ruptured aneurysm  -PT/OT  -antivert prn for dizziness     Other medical issues:   Morbid obesity  Extensive maculopapular Rash-improved.  Likely due to clindamycin, s/p prednisone.  -Benadryl prn  Left arm cellulitis, improved.  DM2.  Hgbaic 5.3 on 9/24, requiring minimal insulin sliding scale.  Accucheck as needed.        Code status:  Full Code - d/w patient at bedside. Father can make decisions if pt unable to do so       GI prophylaxis:pepcid  DVT prophylaxis:HSQ    Subjective:  Tired, wants to sleep a lot, no questions.    Objective:  Vital signs in last 24 hours:  Temp:  [97.8  F (36.6  C)-98.6  F (37  C)] 98.6  F (37  C)  Heart Rate:  [65-75] 66  Resp:  [16-18] 16  BP: (105-133)/(53-63) 119/56  Weight:   (!) 369 lb 4.8 oz (167.5 kg)    Intake/Output last 3 shifts:  I/O last 3 completed shifts:  In: 740 [P.O.:720; I.V.:20]  Out: 2120 [Urine:2120]  Intake/Output this shift:  I/O this shift:  In: -   Out: 450 [Urine:450]    Physical Exam:  General Appearance: NAD lying in bed sleeping  Throat: Moist oral mucosa  Lungs: CTAB  Heart: Regular rate and rhythm, bilat LE with chronic lymphedema  Abdomen: Soft, non-tender to palpation, nd  Extremities: dressing on right lower ext  Skin: Rash resolved.  Neuro: alert and oriented x 3,  nonfocal at baseline but currently sleeping.      Imaging and Lab Results - I have personally  reviewed the latest labs and imaging test results  No results found for this or any previous visit (from the past 24 hour(s)).  Discussed with RN    Disposition/Follow up:  Medically ready for dc to TCU, search underway, anticipate next week.  Obesity will likely limit her options.    All medication issues identified within the last 24 hours have been addressed and completed as appropriate.    Latonya De La Rosa MD  Internal Medicine  [facility]

## 2021-06-20 NOTE — PROGRESS NOTES
[facility] Hospitalist Daily Progress Note    Summary  Marlo Kearns is a 37 y.o. 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 Wound was cleaned at bedside . Mri done and didn't show  septic arthropathy, or abscess. Vascular  studies with normal thomas and right great toe pressures adequate for healing . Infectious Disease recommended a combination of cipro flagyl and vancomycin pending cultures( prelim with mssa and Gram negative bacilli).  She underwent debridement by podiatry in the operating room on 9/21/18.   because of her diastolic dysfunction, she was cleared for the procedure beforehand by cardiology. She was transitioned to clindamycin prior to transfer.     Postoperatively, she is strict non weight bearing right lower extremity. Her CRP has decreased from 15.36 at admission to 5.90 on 9/22/18. She was transferred to [facility] on 9/26/18.    Assessment/Plan  Right foot infection S/p debridement by podiatry in OR on 9/21, discontinued abx on 10/2 due to rash  -WOC  -NWB right foot until seen by podiatry as outpt- ok for weight bearing for bathroom privileges only. Off loading boot for commode transfers  - has appt with vascular center/podiatry on 10/18 with Dr. Kristina Rich  -add hydrocortisone cream for pruritic rash    Diastolic dysfunction  Bradycardia  Echo in 11/2017 with grade 3 diastolic dysfunction, normal EF, wt 363lb (down 17lbs since admission).  Prerenal azotemia resolved.  -outpt cardiology follow up, stress test as outpt  -Daily weights, I and O  -continue torsemide 40 mg twice daily  -Continue  Lopressor and potassium  -Recheck BMP in a few days    H/o tobacco abuse  H/o chronic alcohol use  No alcohol and cigarettes since admission to [facility] on 9/14. At risk for dependence  -minimize narcotics as able  -nicotine patch prn     Possible COPD/CARMENZA  Has not had sleep study in the past. Does not use O2 at home.  -duoneb four times a day and albuterol prn  -cont diuretics to keep dry (see above)     Essential hypertension  Acceptable control overall.  -continue current meds     Seizure disorder (H)  -cont keppra and lamictal  -seizure precautions     Borderline personality disorder  -cont abilify, effexor, Zyprexa prn      Hemiplegia (H)  Cerebral artery occlusion with cerebral infarction (H)  Secondary to ruptured aneurysm  -PT/OT  -antivert prn for dizziness     Other medical issues:   Morbid obesity  Extensive maculopapular Rash-improved.  Itchy.  Likely due to clindamycin, s/p prednisone.  -schedule benadryl and hydrocortisone cream  Left arm cellulitis, improved.  DM2.  Hgbaic 5.3 on 9/24, requiring minimal insulin sliding scale.  Accucheck as needed.        Code status:  Full Code - d/w patient at bedside. Father can make decisions if pt unable to do so       GI prophylaxis:pepcid  DVT prophylaxis:HSQ    Subjective:  Still with itchy skin, agrees to a topical steroid, okay with continuing the Benadryl, no other questions or concerns.    Objective:  Vital signs in last 24 hours:  Temp:  [97.8  F (36.6  C)-98.2  F (36.8  C)] 98.2  F (36.8  C)  Heart Rate:  [66-82] 70  Resp:  [18] 18  BP: (119-136)/(58-94) 119/60  Weight:   (!) 363 lb 1.6 oz (164.7 kg)    Intake/Output last 3 shifts:  I/O last 3 completed shifts:  In: 1375 [P.O.:1355; I.V.:20]  Out: 2500 [Urine:2500]  Intake/Output this shift:       Physical Exam:  General Appearance: NAD, sitting on side of bed talking with the nurse  Throat: Moist oral mucosa  Lungs: CTAB  Heart: Regular rate and rhythm, bilat LE with chronic lymphedema  Abdomen: Soft,  non-tender to palpation, nd  Extremities: dressing on right lower ext  Skin: Excoriated, dry, increased erythematous patches on chest and arms evidence of recent scratching  Neuro: alert and oriented x 3, nonfocal      Imaging and Lab Results - I have personally  reviewed the latest labs and imaging test results  Recent Results (from the past 24 hour(s))   Basic Metabolic Panel   Result Value Ref Range    Sodium 138 136 - 145 mmol/L    Potassium 3.7 3.5 - 5.0 mmol/L    Chloride 97 (L) 98 - 107 mmol/L    CO2 31 22 - 31 mmol/L    Anion Gap, Calculation 10 5 - 18 mmol/L    Glucose 100 70 - 125 mg/dL    Calcium 9.6 8.5 - 10.5 mg/dL    BUN 18 8 - 22 mg/dL    Creatinine 0.76 0.60 - 1.10 mg/dL    GFR MDRD Af Amer >60 >60 mL/min/1.73m2    GFR MDRD Non Af Amer >60 >60 mL/min/1.73m2     Discussed with RN    Disposition/Follow up:  Medically ready for dc to TCU, search underway.  Obesity will likely limit her options.    All medication issues identified within the last 24 hours have been addressed and completed as appropriate.    Latonya De La Rosa MD  Internal Medicine  [facility]

## 2021-06-20 NOTE — PROGRESS NOTES
[facility]ist Daily Progress Note    Summary  Marlo Kearns is a 37 y.o. 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 Wound was cleaned at bedside . Mri done and didn't show  septic arthropathy, or abscess. Vascular  studies with normal thomas and right great toe pressures adequate for healing . Infectious Disease recommended a combination of cipro flagyl and vancomycin pending cultures( prelim with mssa and Gram negative bacilli).  She underwent debridement by podiatry in the operating room on 9/21/18.   because of her diastolic dysfunction, she was cleared for the procedure beforehand by cardiology. She was transitioned to clindamycin prior to transfer.     Postoperatively, she is strict non weight bearing right lower extremity. Her CRP has decreased from 15.36 at admission to 5.90 on 9/22/18. She was transferred to [facility] on 9/26/18.    Assessment/Plan  Right foot infection  Wound culture: mostly MSSA. Also has Anaerobic gram positive cocci. Stepped on glass few weeks ago; no foreign bodies on imaging. No osteomyelitis per imaging (MRI). Vascular  studies with normal thomas and right great toe pressures adequate for healing . Initially cipro flagyl and vancomycin. S/p debridement by podiatry in OR on 9/21, antibiotics changed to clindamycin. Wound vac placed  -woc consult  -continue clindamycin X 7 days  -pain meds - tylenol prn, percocet 1 - 2 tab q 4 prn  -dakins  -NWB right foot - ok for weight bearing for bathroom privileges only. Off loading boot for commode transfers      Diastolic  dysfunction  Bradycardia  Echo in 11/2017 with grade 3 diastolic dysfunction, normal EF   -outpt cardiology follow up   -Daily weights, I and O  -cont torsemide and potassium -  -follow electrolytes  -stress test as outpt  -on lopressor - dose reduced and hold parameters in place     H/o tobacco abuse  H/o chronic alcohol use  No alcohol and cigarettes since admission to [facility] on 9/14. At risk for dependence  -minimize narcotics as able  -nicotine patch prn     Shortness of breath  Hypoxia  Possible COPD/CARMENZA  Has not had sleep study in the past. Does not use o2 at home,  -o2 as needed  -duoneb four times a day and albuterol prn  -cont diuretics  -cxr on 9/27 with edema     Essential hypertension  -cont norvasc, lopressor     Seizure disorder (H)  -cont keppra and lamictal  -seizures precautions     Borderline personality disorder  -cont abilify, effexor  -zyprexa prn  -psych consult if needed     Hemiplegia (H)  Cerebral artery occlusion with cerebral infarction (H)  Secondary to ruptured aneurysm  -PT/OT  -antivert prn for dizziness     DM  Hgbaic 5.3 on 9/24, sugars stable, has not needed insulin  -d/c blood glucose checks  -hypoglycemia protocol     Morbid obesity  -bariatric equipment  -skin cares - woc to follow - micotin three times a day and prn, aquaphor, calmoseptine  -cont folvite      hypokalemia  Due to diuretic use  -on K replacement protocol as well as kcl 20 two times a day due to diuretic use    Code status:  Full Code - d/w patient at bedside. Father can make decisions if pt unable to do so     Other meds: narcan  GI prophylaxis:pepcid, zofran prn, tums prn  DVT prophylaxis:HSQ  Bowel regiment: dulcolax prn, culturelle bid         Subjective:  Pt says doing ok    Objective:  Vital signs in last 24 hours:  Temp:  [97.4  F (36.3  C)-98  F (36.7  C)] 98  F (36.7  C)  Heart Rate:  [56-71] 57  Resp:  [18-24] 18  BP: (101-109)/(49-56) 103/56  Weight:   (!) 468 lb 1.6 oz (212.3 kg)    Intake/Output last 3  shifts:  I/O last 3 completed shifts:  In: 960 [P.O.:960]  Out: -   Intake/Output this shift:         Physical Exam:  General Appearance:  appears comfortable.   Alert, cooperative, no distress,   Throat: Oropharynx is clear, moist oral mucosa.   Lungs: Clear to auscultation bilaterally, respirations unlabored.  Heart: Regular rate and rhythm, S1 and S2 normal, no murmur, rubs or gallop, bilat lower extremity edema.  Abdomen: Soft, non-tender to palpation, bowel sounds present.  Extremities: dressing on right lower ext  Pulses: DP pulses are 1-2+ bilat.    Skin: No rashes or lesions.  Neuro: AAO X 3, mild right sided weakness, difficulty  Moving right leg due to pain       Imaging and Lab Results - I have personally  reviewed the latest labs and imaging test results    CXR 9/27/18  FINDINGS: Heart and mediastinal size are within normal limits given AP portable technique. There is indistinctness of the pulmonary interstitium, compatible with edema. No lobar infiltrate. No obvious pleural effusion or pneumothorax.    Disposition/Follow up pending    Aura Preston MD, MPH  [facility]

## 2021-06-20 NOTE — PROGRESS NOTES
[facility] Hospitalist Daily Progress Note    Summary  Marlo Kearns is a 37 y.o. 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 Wound was cleaned at bedside . Mri done and didn't show  septic arthropathy, or abscess. Vascular  studies with normal thomas and right great toe pressures adequate for healing . Infectious Disease recommended a combination of cipro flagyl and vancomycin pending cultures( prelim with mssa and Gram negative bacilli).  She underwent debridement by podiatry in the operating room on 9/21/18.   because of her diastolic dysfunction, she was cleared for the procedure beforehand by cardiology. She was transitioned to clindamycin prior to transfer.     Postoperatively, she is strict non weight bearing right lower extremity. Her CRP has decreased from 15.36 at admission to 5.90 on 9/22/18. She was transferred to [facility] on 9/26/18.    Assessment/Plan  Right foot infection  Wound culture: mostly MSSA. Also has Anaerobic gram positive cocci. Stepped on glass few weeks ago; no foreign bodies on imaging. No osteomyelitis per imaging (MRI). Vascular  studies with normal thomas and right great toe pressures adequate for healing . Initially cipro flagyl and vancomycin. S/p debridement by podiatry in OR on 9/21, antibiotics changed to clindamycin. Wound vac placed  -woc consult  -continue clindamycin X 7 days  -pain meds - tylenol prn, percocet 1 - 2 tab q 4 prn  -collagenase  -NWB right foot - ok for weight bearing for bathroom privileges only      Diastolic dysfunction  Bradycardia  Echo in 11/2017 with grade 3  diastolic dysfunction, normal EF   -outpt cardiology follow up   -Daily weights, I and O  -cont torsemide and potassium  -follow electrolytes  -stress test as outpt  -on lopressor - dose reduced and hold parameters in place     H/o tobacco abuse  H/o chronic alcohol use  No alcohol and cigarettes since admission to [facility] on 9/14. At risk for dependence  -minimize narcotics as able  -nicotine luis prn     Shortness of breath  Hypoxia  Possible COPD/CARMENZA  Has not had sleep study in the past. Does not use o2 at home, some wheezing, chf may be contributing  -o2 as needed  -duoneb four times a day and albuterol prn  -cont diuretics  -cxr on 9/27 with edema     Essential hypertension  -cont norvasc, lopressor     Seizure disorder (H)  -cont keppra and lamictal  -seizures precautions     Borderline personality disorder  -cont abilify, effexor  -zyprexa prn  -psych consult if needed     Hemiplegia (H)  Cerebral artery occlusion with cerebral infarction (H)  Secondary to ruptured aneurysm  -PT/OT  -antivert prn for dizziness     DM  Hgbaic 5.3 on 9/24  -monitor blood glucose - start ssi if needed  -hypoglycemia protocol     Morbid obesity  -bariatric equipment  -skin cares - woc to follow - micotin three times a day and prn, aquaphor, calmoseptine  -cont folvite      hypokalemia  Due to diuretic use  Start K replacement protocol    Code status:  Full Code - d/w patient at bedside. Father can make decisions if pt unable to do so     Other meds: narcan  GI prophylaxis:pepcid, zofran prn, tums prn  DVT prophylaxis:HSQ  Bowel regiment: dulcolax prn, culturelle bid         Subjective:  Pt says doing ok    Objective:  Vital signs in last 24 hours:  Temp:  [97.8  F (36.6  C)-98.7  F (37.1  C)] 97.8  F (36.6  C)  Heart Rate:  [52-71] 56  Resp:  [18-24] 24  BP: (108-135)/(53-68) 133/68  Weight:   (!) 468 lb 1.6 oz (212.3 kg)    Intake/Output last 3 shifts:  I/O last 3 completed shifts:  In: 100 [P.O.:100]  Out: -   Intake/Output this  shift:         Physical Exam:  General Appearance:  appears comfortable.   Alert, cooperative, no distress,   Throat: Oropharynx is clear, moist oral mucosa.   Lungs: Clear to auscultation bilaterally, respirations unlabored.  Heart: Regular rate and rhythm, S1 and S2 normal, no murmur, rubs or gallop, bilat lower extremity edema.  Abdomen: Soft, non-tender to palpation, bowel sounds present.  Extremities: wound vac on right lower ext  Pulses: DP pulses are 1-2+ bilat.    Skin: No rashes or lesions.  Neuro: AAO X 3, mild right sided weakness, difficulty  Moving right leg due to pain and vac      Imaging and Lab Results - I have personally  reviewed the latest labs and imaging test results    CXR 9/27/18  FINDINGS: Heart and mediastinal size are within normal limits given AP portable technique. There is indistinctness of the pulmonary interstitium, compatible with edema. No lobar infiltrate. No obvious pleural effusion or pneumothorax.    Disposition/Follow up pending    Aura Preston MD, MPH  [facility]

## 2021-06-20 NOTE — PROGRESS NOTES
Pharmacy Note: Admission Drug Regimen Review    Admission drug regimen review has been completed. The following medication issues were identified.    1. No issues noted.    Thank you,  Maverick Prieto RPh 9/26/2018 2:13 PM

## 2021-06-20 NOTE — PROGRESS NOTES
[facility] Hospitalist Daily Progress Note    Summary  Marlo Kearns is a 37 y.o. 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 Wound was cleaned at bedside . Mri done and didn't show  septic arthropathy, or abscess. Vascular  studies with normal thomas and right great toe pressures adequate for healing . Infectious Disease recommended a combination of cipro flagyl and vancomycin pending cultures( prelim with mssa and Gram negative bacilli).  She underwent debridement by podiatry in the operating room on 9/21/18.   because of her diastolic dysfunction, she was cleared for the procedure beforehand by cardiology. She was transitioned to clindamycin prior to transfer.     Postoperatively, she is strict non weight bearing right lower extremity. Her CRP has decreased from 15.36 at admission to 5.90 on 9/22/18. She was transferred to [facility] on 9/26/18.    Assessment/Plan  Right foot infection S/p debridement by podiatry in OR on 9/21, discontinued abx on 10/2 due to rash, s/p bedside debridement with Dr. Casanova 10/9.  -WOC, wet to moist dressings recommended  -NWB right foot until seen by podiatry as outpt, ok for weight bearing for bathroom privileges only, off loading boot for commode transfers  - has appt with vascular center/podiatry on 10/18 with Dr. Kristina Rich  -continue hydrocortisone cream for pruritic rash    Diastolic dysfunction  Bradycardia  Echo in 11/2017 with grade 3 diastolic dysfunction, normal EF, wt 367lb (down 13 lbs since admission).  BMP remains stable.  -outpt cardiology follow up, stress test as  outpt  -Daily weights, I and O  -continue torsemide 40 mg twice daily  -Continue Lopressor and potassium  -Recheck BMP prn    H/o tobacco abuse  H/o chronic alcohol use  No alcohol and cigarettes since admission to [facility] on 9/14.  At risk for dependence.  -minimize narcotics as able  -nicotine patch prn     Possible COPD/CARMENZA  Has not had sleep study in the past. Does not use O2 at home.  -albuterol nebs prn  -cont diuretics to keep dry (see above)  -sleep study as outpatient     Essential hypertension  Acceptable control.   anticipation/anxiety/pain.  -continue current meds     Seizure disorder (H)  -cont keppra and lamictal  -seizure precautions     Borderline personality disorder  -cont abilify, effexor, Zyprexa prn      Hemiplegia (H)  Cerebral artery occlusion with cerebral infarction (H)  Secondary to ruptured aneurysm  -PT/OT  -antivert prn for dizziness     Other medical issues:   Morbid obesity  Extensive maculopapular Rash-improved.  Remains itchy, likely due to clindamycin, s/p prednisone.  -continue scheduled benadryl and hydrocortisone cream  Left arm cellulitis, improved.  DM2.  Hgbaic 5.3 on 9/24, requiring minimal insulin sliding scale.  Accucheck as needed.        Code status:  Full Code - d/w patient at bedside. Father can make decisions if pt unable to do so       GI prophylaxis:pepcid  DVT prophylaxis:HSQ    Subjective:  Feels well, ready for PT, skin still itchy, no other questions.    Objective:  Vital signs in last 24 hours:  Temp:  [98.2  F (36.8  C)-98.6  F (37  C)] 98.2  F (36.8  C)  Heart Rate:  [71-86] 77  Resp:  [18-19] 18  BP: (107-141)/(59-84) 140/72  Weight:   (!) 367 lb 1.6 oz (166.5 kg)    Intake/Output last 3 shifts:  I/O last 3 completed shifts:  In: 1421 [P.O.:1401; I.V.:20]  Out: 1350 [Urine:1350]  Intake/Output this shift:  I/O this shift:  In: 1220 [P.O.:1200; I.V.:20]  Out: -     Physical Exam:  General Appearance: NAD, sitting up in chair, just got off commode.  Throat:  Moist oral mucosa  Lungs: CTAB  Heart: Regular rate and rhythm, bilat LE with chronic lymphedema  Abdomen: Soft, non-tender to palpation, nd  Extremities: Dressing on right lower ext CDI  Skin: Excoriated, dry, erythematous patches on chest and arms evidence of recent scratching mildly improved.  Neuro: Alert and oriented x 3, nonfocal      Imaging and Lab Results - I have personally  reviewed the latest labs and imaging test results  Recent Results (from the past 24 hour(s))   POCT Glucose   Result Value Ref Range    Glucose,  mg/dL     D/w RN    Disposition/Follow up:  Medically ready for dc to TCU, search underway.  Obesity will likely limit her options.    All medication issues identified within the last 24 hours have been addressed and completed as appropriate.    Latonya De La Rosa MD  Internal Medicine  [facility]

## 2021-06-20 NOTE — PROGRESS NOTES
[facility]ist Daily Progress Note    Summary  Marlo Kearns is a 37 y.o. 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 Wound was cleaned at bedside . Mri done and didn't show  septic arthropathy, or abscess. Vascular  studies with normal thomas and right great toe pressures adequate for healing . Infectious Disease recommended a combination of cipro flagyl and vancomycin pending cultures( prelim with mssa and Gram negative bacilli).  She underwent debridement by podiatry in the operating room on 9/21/18.   because of her diastolic dysfunction, she was cleared for the procedure beforehand by cardiology. She was transitioned to clindamycin prior to transfer.     Postoperatively, she is strict non weight bearing right lower extremity. Her CRP has decreased from 15.36 at admission to 5.90 on 9/22/18. She was transferred to [facility] on 9/26/18.    Assessment/Plan  Right foot infection S/p debridement by podiatry in OR on 9/21, discontinued abx on 10/2 due to rash, s/p bedside debridement with Dr. Casanova 10/9.  -WOC, wet to moist dressings recommended  -NWB right foot until seen by podiatry as outpt, ok for weight bearing for bathroom privileges only, off loading boot for commode transfers  - has appt with vascular center/podiatry on 10/18 with Dr. Kristina Rich  -continue hydrocortisone cream for pruritic rash    Diastolic dysfunction  Bradycardia  Echo in 11/2017 with grade 3 diastolic dysfunction, normal EF, wt 367lb (down 13 lbs since admission).  BMP remains stable.  -outpt cardiology follow up, stress test as  outpt  -Daily weights, I and O  -continue torsemide 40 mg twice daily  -Continue Lopressor and potassium  -Recheck BMP prn    H/o tobacco abuse /  H/o chronic alcohol use  No alcohol and cigarettes since admission to [facility] on 9/14.  At risk for dependence.  -minimize narcotics as able  -nicotine patch prn     Possible COPD/CARMENZA  Has not had sleep study in the past. Does not use O2 at home.  -albuterol nebs prn  -cont diuretics to keep dry (see above)  -sleep study as outpatient     Essential hypertension  Acceptable control.   anticipation/anxiety/pain.  -continue current meds     Seizure disorder (H)  -cont keppra and lamictal  -seizure precautions     Borderline personality disorder  -cont abilify, effexor, Zyprexa prn      Hemiplegia (H)  Cerebral artery occlusion with cerebral infarction (H)  Secondary to ruptured aneurysm  -PT/OT  -antivert prn for dizziness    Extensive maculopapular Rash-improved.  Remains itchy, likely due to clindamycin, s/p prednisone.  -continue scheduled benadryl and hydrocortisone cream    Left arm cellulitis, improved.    DM2.  Hgbaic 5.3 on 9/24, requiring minimal insulin sliding scale.  Accucheck as needed.        Code status:  Full Code - d/w patient at bedside. Father can make decisions if pt unable to do so       GI prophylaxis:pepcid  DVT prophylaxis:HSQ    Subjective:  No overnight events, denies shortness of breath or chest pain.  Discussed with her about the care plan, aware about the difficulties of finding transitional care unit.     Objective:  Vital signs in last 24 hours:  Temp:  [97.2  F (36.2  C)-98.4  F (36.9  C)] 97.2  F (36.2  C)  Heart Rate:  [69-94] 88  Resp:  [18] 18  BP: ()/(52-93) 137/93  Weight:   (!) 367 lb 1.6 oz (166.5 kg)    Intake/Output last 3 shifts:  I/O last 3 completed shifts:  In: 1240 [P.O.:1200; I.V.:40]  Out: -   Intake/Output this shift:  I/O this shift:  In: 990 [P.O.:970; I.V.:20]  Out: 275 [Urine:275]    Physical Exam:  General  Appearance: NAD, sitting up in chair, just got off commode.  Throat: Moist oral mucosa  Lungs: CTAB  Heart: Regular rate and rhythm, bilat LE with chronic lymphedema  Abdomen: Soft, non-tender to palpation, nd  Extremities: Dressing on right lower ext CDI  Skin: Excoriated, dry, erythematous patches on chest and arms evidence of recent scratching mildly improved.  Neuro: Alert and oriented x 3, nonfocal      Imaging and Lab Results - I have personally  reviewed the latest labs and imaging test results  No results found for this or any previous visit (from the past 24 hour(s)).  D/w RN    Disposition/Follow up:  Medically ready for dc to TCU, when available.  Obesity will likely limit her options.    All medication issues identified within the last 24 hours have been addressed and completed as appropriate.    Idania Brothers MD  Internal Medicine

## 2021-06-20 NOTE — PROGRESS NOTES
INFECTIOUS DISEASE FOLLOW UP NOTE    Date: 10/3/2018    ASSESSMENT:  1. Skin and soft tissue infection R foot with MSSA, anaerobes. She has had adequate duration of antibiotics for this.   2. Drug rash likely clindamycin. Now stopped. Improving.   3. Early thrush  4. obesity    PLAN:  Prednisone for rash  Oral nystatin -- add fluconazole if not improving.   Now off antibiotics  Remove PICC when no longer needed.  I will sign off. Please call with questions.      Damien Wang MD  [facility]   On-Call: [phone]     ______________________________________________________________________    SUBJECTIVE / INTERVAL HISTORY: rash a little better. No fever. Mouth feels better. Foot feels the same.    ROS: All other systems negative except as listed above.        OBJECTIVE:  Vitals:    10/03/18 0844   BP: 162/90   Pulse: 66   Resp:    Temp:    SpO2: 94%          Vital Signs  Temp: 98  F (36.7  C)  Temp Source: Oral  Heart Rate: 66  Heart Rate Source: Machine/Monitor  Resp: 12  BP: 162/90  MAP (mmHg) (Calculated): 114  BP Location: Left forearm  BP Method: Machine/Monitor  Patient Position: Sitting    Temp (24hrs), Av.2  F (36.8  C), Min:98  F (36.7  C), Max:98.5  F (36.9  C)      GEN: No acute distress.    HEENT: thrush, angular cheilitis  RESPIRATORY:  Normal breathing pattern.   CARDIOVASCULAR:  Regular rate and rhythm. Normal S1 and S2. No murmur, click, gallop or rub.   ABDOMEN:  Soft, normal bowel sounds, obese  EXTREMITIES: No edema.  SKIN/HAIR/NAILS:  Diffused maculopapular rash on trunk, neck, arms, thighs, somewhat less red today.   Wound R foot not viewed today.   IV: PICC        Antibiotics:  last clindamycin   Vancomycin -10/2    Pertinent labs:      Results from last 7 days  Lab Units 10/02/18  0630 18  0643 18  0951 18  0953   LN-WHITE BLOOD CELL COUNT thou/uL  --  7.4  --  6.3   LN-HEMOGLOBIN g/dL  --  11.5*  --  11.9*   LN-HEMATOCRIT %  --  35.6   --  36.5   LN-PLATELET COUNT thou/uL 171 176 181 215        Results from last 7 days  Lab Units 10/02/18  0630 10/01/18  0742 09/30/18  0643  09/28/18  0706  09/27/18  0953   LN-SODIUM mmol/L  --   --  136  --  140  --  139   LN-POTASSIUM mmol/L 4.3 3.7 4.5  < > 4.0  4.0  < > 3.1*   LN-CHLORIDE mmol/L  --   --  98  --  99  --  93*   LN-CO2 mmol/L  --   --  27  --  30  --  36*   LN-BLOOD UREA NITROGEN mg/dL 11  --  11  --  10  --  11   LN-CREATININE mg/dL 0.70  --  0.73  --  0.69  --  0.75   LN-CALCIUM mg/dL  --   --  9.3  --  9.2  --  9.7   < > = values in this interval not displayed.   No results found for: CRP   No results found for: SEDRATE    Lab Results   Component Value Date    ALT 18 09/27/2018    AST 24 09/27/2018    ALKPHOS 48 09/27/2018    BILITOT 0.5 09/27/2018         MICROBIOLOGY DATA:  Reviewed from [facility]    RADIOLOGY:  Xr Chest 1 View Portable    Result Date: 9/27/2018  XR CHEST 1 VIEW PORTABLE 9/27/2018 8:20 AM INDICATION: SOB. COMPARISON: None. FINDINGS: Heart and mediastinal size are within normal limits given AP portable technique. There is indistinctness of the pulmonary interstitium, compatible with edema. No lobar infiltrate. No obvious pleural effusion or pneumothorax.     Xr Chest 1 View For Picc Placement Portable    Result Date: 9/29/2018  XR CHEST 1 VIEW FOR PICC LINE PLACEMENT PORTABLE 9/29/2018 8:08 PM INDICATION: Verify catheter placement COMPARISON: 09/27/2018 FINDINGS: PICC line in the SVC. Chest otherwise negative.

## 2021-06-20 NOTE — PROGRESS NOTES
[facility]ist Daily Progress Note    Summary  Marlo Kearns is a 37 y.o. 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 Wound was cleaned at bedside . Mri done and didn't show  septic arthropathy, or abscess. Vascular  studies with normal thomas and right great toe pressures adequate for healing . Infectious Disease recommended a combination of cipro flagyl and vancomycin pending cultures( prelim with mssa and Gram negative bacilli).  She underwent debridement by podiatry in the operating room on 9/21/18.   because of her diastolic dysfunction, she was cleared for the procedure beforehand by cardiology. She was transitioned to clindamycin prior to transfer.     Postoperatively, she is strict non weight bearing right lower extremity. Her CRP has decreased from 15.36 at admission to 5.90 on 9/22/18. She was transferred to [facility] on 9/26/18.    Assessment/Plan  Right foot infection S/p debridement by podiatry in OR on 9/21, discontinued abx on 10/2 due to rash/  S/p bedside debridement with Dr. Casanova 10/9.  -WOC, wet to moist dressings recommended  -NWB right foot until seen by podiatry as outpt, ok for weight bearing for bathroom privileges only. Off loading boot for commode transfers  - has appt with vascular center/podiatry on 10/18 with Dr. Kristina Rich  -continue hydrocortisone cream for pruritic rash    Diastolic dysfunction  Bradycardia  Echo in 11/2017 with grade 3 diastolic dysfunction, normal EF, wt 362lb (down 18lbs since admission).  -outpt cardiology follow up, stress test as outpt  -Daily weights, I and  O  -continue torsemide 40 mg twice daily  -Continue Lopressor and potassium  -Recheck BMP in a few days    H/o tobacco abuse  H/o chronic alcohol use  No alcohol and cigarettes since admission to [facility] on 9/14. At risk for dependence  -minimize narcotics as able  -nicotine patch prn     Possible COPD/CARMENZA  Has not had sleep study in the past. Does not use O2 at home.  -duoneb four times a day and albuterol prn  -cont diuretics to keep dry (see above)  -sleep study as outpatient     Essential hypertension  Acceptable control overall.  One high measurement this morning likely secondary to wound debridement anticipation/anxiety/pain.  -continue current meds     Seizure disorder (H)  -cont keppra and lamictal  -seizure precautions     Borderline personality disorder  -cont abilify, effexor, Zyprexa prn      Hemiplegia (H)  Cerebral artery occlusion with cerebral infarction (H)  Secondary to ruptured aneurysm  -PT/OT  -antivert prn for dizziness     Other medical issues:   Morbid obesity  Extensive maculopapular Rash-improved.  Itchy.  Likely due to clindamycin, s/p prednisone.  -schedule benadryl and hydrocortisone cream  Left arm cellulitis, improved.  DM2.  Hgbaic 5.3 on 9/24, requiring minimal insulin sliding scale.  Accucheck as needed.        Code status:  Full Code - d/w patient at bedside. Father can make decisions if pt unable to do so       GI prophylaxis:pepcid  DVT prophylaxis:HSQ    Subjective:  Tired, wants to sleep, had foot wound debrided today and no VAC was recommended at this time.  Skin still itchy.    Objective:  Vital signs in last 24 hours:  Temp:  [97.7  F (36.5  C)-98.2  F (36.8  C)] 97.7  F (36.5  C)  Heart Rate:  [68-81] 81  Resp:  [18] 18  BP: (106-141)/(52-95) 140/95  Weight:   (!) 362 lb 14.4 oz (164.6 kg)    Intake/Output last 3 shifts:  I/O last 3 completed shifts:  In: 1136 [P.O.:1116; I.V.:20]  Out: 1300 [Urine:1300]  Intake/Output this shift:  I/O this shift:  In: 496 [P.O.:476;  I.V.:20]  Out: -     Physical Exam:  General Appearance: NAD, lying in bed resting  Throat: Moist oral mucosa  Lungs: CTAB  Heart: Regular rate and rhythm, bilat LE with chronic lymphedema  Abdomen: Soft, non-tender to palpation, nd  Extremities: dressing on right lower ext CDI  Skin: Excoriated, dry, erythematous patches on chest and arms evidence of recent scratching  Neuro: alert and oriented x 3, nonfocal      Imaging and Lab Results - I have personally  reviewed the latest labs and imaging test results  No results found for this or any previous visit (from the past 24 hour(s)).  Sodium 138 potassium 3.7 chloride 97 CO2 31 BUN 18 creatinine 0.76 calcium 9.6 glucose 100    Disposition/Follow up:  Medically ready for dc to TCU, search underway.  Obesity will likely limit her options.    All medication issues identified within the last 24 hours have been addressed and completed as appropriate.    Latonya De La Rosa MD  Internal Medicine  [facility]

## 2021-06-20 NOTE — PROGRESS NOTES
"Pharmacy Consult: Vancomycin Dosing          Other current antimicrobials             vancomycin 2 g in sodium chloride 0.9% 500 mL (VANCOCIN)  Every 18 hours             Subjective/Objective:    Patient was admitted for Right foot infection on 9/26/2018    Height: 5' 2\" (1.575 m)    Actual Body Weight (ABW): (!) 175.2 kg (386 lb 3.2 oz)    Ideal body weight: 50.1 kg (110 lb 7.2 oz)  Adjusted ideal body weight: 100.1 kg (220 lb 12 oz)    BMI: Body mass index is 70.64 kg/(m^2).    Allergies   Allergen Reactions     Fish Containing Products Anaphylaxis     Can eat seafood or salt water, but reacts to freshwater fish.  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     Hemiplegia (H)     Need for prophylactic measure     Physical deconditioning     Malnutrition (H)     TBI (traumatic brain injury) (H)     Unspecified episodic mood disorder     Diastolic dysfunction     Seizure disorder (H)     Right foot infection     Foot infection    Past Medical History:   Diagnosis Date     Acute renal failure (H)      MEGHA (acute kidney injury) (H)      Aphasia      AV malformation, acquired (H)      AVM (arteriovenous malformation) brain     left, ruptured     Bipolar affective (H)      Borderline personality disorder      Cerebral edema (H)      Elevated glucose      Encephalopathy      Encephalopathy      Headache      Hemiparesis (H)      Hypertension      Intraventricular hemorrhage " (H)      Morbid obesity (H)      Pneumonia      Polysubstance abuse      Polysubstance abuse      PTSD (post-traumatic stress disorder)      Renal failure      Stroke (H)      Suicidal ideation      UTI (urinary tract infection)         Temp Readings from Current Encounter:     09/30/18 1517 09/30/18 2304 10/01/18 0734   Temp: 98.8  F (37.1  C) 97.9  F (36.6  C) 97.9  F (36.6  C)     Net Intake/Output (last 24 hours):  I/O last 3 completed shifts:  In: 20 [I.V.:20]  Out: -     Recent Labs      09/30/18   0643   WBC  7.4   BUN  11   CREATININE  0.73     Estimated Creatinine Clearance: 163.5 mL/min (by C-G formula based on Cr of 0.73).    No results for input(s): CULTURE in the last 72 hours.    Results for orders placed or performed during the hospital encounter of 09/26/18   Obtain MRSA screen of nares    Collection Time: 09/27/18 10:30 AM   Result Value Status    Culture No MRSA isolated Final       Recent labs: (last 7 days)      10/01/18   1248   VANCOMYCIN  20.5       Vancomycin administrations: (last 120 hours)     Date/Time Action Medication Dose Rate    10/01/18 1300 New Bag    vancomycin 2 g in sodium chloride 0.9% 500 mL (VANCOCIN) 2 g 180 mL/hr    10/01/18 0107 New Bag    vancomycin 2 g in sodium chloride 0.9% 500 mL (VANCOCIN) 2 g 180 mL/hr    09/30/18 1350 New Bag    vancomycin 2 g in sodium chloride 0.9% 500 mL (VANCOCIN) 2 g 180 mL/hr    09/30/18 0103 New Bag    vancomycin 2 g in sodium chloride 0.9% 500 mL (VANCOCIN) 2 g 180 mL/hr    09/29/18 1300 New Bag    vancomycin 2 g in sodium chloride 0.9% 500 mL (VANCOCIN) 2 g 180 mL/hr          Assessment/Plan:    Cellulitis of right foot.   Multiple abx allergies  ID consult pending  (per Dr. Preston note,  Discussed w/ ID to change from Clindamycin to Vanco due to possible drug reaction)  10/01/18: Vanco trough level = 20.5 mcg/ml (prior to 5th dose)  This is at steady state.  Will change frequency to Vanco 2gm IV  q18h and recheck level prior to 3rd dose  after dose change to assess if level staying higher. Patient receiving max dose= Vanco 2gm (weight= 175.2 kg)    1. Change to  vancomycin 2g iv q18h   (Abw = 175.2kg)  2. vanco level 10/3 at 1945 (prior to 3rd dose after frequency change)  3. Pharmacist will continue to follow.    Thank you for the consult.  Rachel Chatman RPh 10/1/2018 3:10 PM

## 2021-06-20 NOTE — PROGRESS NOTES
[facility] Hospitalist Daily Progress Note    Summary  Marlo Kearns is a 37 y.o. 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 Wound was cleaned at bedside . Mri done and didn't show  septic arthropathy, or abscess. Vascular  studies with normal thomas and right great toe pressures adequate for healing . Infectious Disease recommended a combination of cipro flagyl and vancomycin pending cultures( prelim with mssa and Gram negative bacilli).  She underwent debridement by podiatry in the operating room on 9/21/18.   because of her diastolic dysfunction, she was cleared for the procedure beforehand by cardiology. She was transitioned to clindamycin prior to transfer.     Postoperatively, she is strict non weight bearing right lower extremity. Her CRP has decreased from 15.36 at admission to 5.90 on 9/22/18. She was transferred to [facility] on 9/26/18.    Assessment/Plan  Right foot infection S/p debridement by podiatry in OR on 9/21, discontinued abx on 10/2 due to rash  -WOC  -NWB right foot until seen by podiatry as outpt- ok for weight bearing for bathroom privileges only. Off loading boot for commode transfers  - has appt with vascular center/podiatry on 10/18 with Dr. Kristina Rich    Extensive maculopapular Rash-improved  Likely due to clindamycin, s/p prednisone.  -Change Benadryl to as needed  -Discontinue hydroxyzine as she is not using    Left arm cellulitis  Continues to improve  -monitor    Diastolic dysfunction  Bradycardia  Echo in 11/2017 with grade 3 diastolic dysfunction, normal EF, wt down to 378lb (last  weight 10/3), net positive fluid balance of 3.8 L, BMP ok  -outpt cardiology follow up, stress test as outpt  -Daily weights, I and O  -Increase torsemide to 60 mg twice daily  -Continue Lopressor and potassium  -Recheck BMP in a few days    H/o tobacco abuse  H/o chronic alcohol use  No alcohol and cigarettes since admission to [facility] on 9/14. At risk for dependence  -minimize narcotics as able  -nicotine patch prn     Possible COPD/CARMENZA  Has not had sleep study in the past. Does not use o2 at home,  -duoneb four times a day and albuterol prn  -cont diuretics to keep dry (see above)     Essential hypertension  Acceptable control overall.  -continue current meds     Seizure disorder (H)  -cont keppra and lamictal  -seizure precautions     Borderline personality disorder  -cont abilify, effexor, Zyprexa prn      Hemiplegia (H)  Cerebral artery occlusion with cerebral infarction (H)  Secondary to ruptured aneurysm  -PT/OT  -antivert prn for dizziness     DM2  Hgbaic 5.3 on 9/24, requiring minimal insulin sliding scale  -Discontinue Accu-Cheks and sliding scale, check as needed.     Morbid obesity        Code status:  Full Code - d/w patient at bedside. Father can make decisions if pt unable to do so       GI prophylaxis:pepcid  DVT prophylaxis:HSQ    Subjective:  Nothing new, rash is nearly resolved, no more itching, no other questions.    Objective:  Vital signs in last 24 hours:  Temp:  [97.9  F (36.6  C)-98.4  F (36.9  C)] 98  F (36.7  C)  Heart Rate:  [54-68] 68  Resp:  [18-19] 19  BP: (108-137)/(52-76) 122/59  Weight:   (!) 378 lb 3.2 oz (171.6 kg)    Intake/Output last 3 shifts:  I/O last 3 completed shifts:  In: 980 [P.O.:960; I.V.:20]  Out: 950 [Urine:950]  Intake/Output this shift:       Physical Exam:  General Appearance: NAD lying in bed  Throat: Moist oral mucosa  Lungs: CTAB  Heart: Regular rate and rhythm, bilat LE with chronic lymphedema  Abdomen: Soft, non-tender to palpation, nd  Extremities: dressing  on right lower ext  Skin: Near resolution of erythematous rash throughout  Neuro: alert and oriented x 3, nonfocal       Imaging and Lab Results - I have personally  reviewed the latest labs and imaging test results  Recent Results (from the past 24 hour(s))   POCT Glucose   Result Value Ref Range    Glucose, POC 97 mg/dL   POCT Glucose   Result Value Ref Range    Glucose,  mg/dL   POCT Glucose   Result Value Ref Range    Glucose,  mg/dL   POCT Glucose   Result Value Ref Range    Glucose,  mg/dL   POCT Glucose   Result Value Ref Range    Glucose, POC 98 mg/dL     Discussed with RN    Disposition/Follow up:  Medically ready for dc to TCU, search underway, anticipate next week.  Obesity will likely limit her options.    All medication issues identified within the last 24 hours have been addressed and completed as appropriate.    Latonya De La Rosa MD  Internal Medicine  [facility]

## 2021-06-20 NOTE — H&P
[facility] Admission History and Physical     Marlo Kearns,  1980, MRN [id number]    PCP: Kia Villalpando PA-C, [phone]      Transferring facility  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     Hemiplegia (H)     Need for prophylactic measure     Physical deconditioning     Malnutrition (H)     TBI (traumatic brain injury) (H)     Unspecified episodic mood disorder     Diastolic dysfunction     Seizure disorder (H)     Right foot infection     Foot infection          PMH Past Medical History:   Diagnosis Date     Acute renal failure (H)      MEGHA (acute kidney injury) (H)      Aphasia      AV malformation, acquired (H)      AVM (arteriovenous malformation) brain      Bipolar affective (H)      Borderline personality disorder      Cerebral edema (H)      Elevated glucose      Encephalopathy      Encephalopathy      Headache      Hemiparesis (H)      Hypertension      Intraventricular hemorrhage (H)      Morbid obesity (H)      Pneumonia      Polysubstance abuse      Polysubstance abuse      PTSD (post-traumatic stress disorder)      Renal failure      Stroke (H)      Suicidal ideation      UTI (urinary tract infection)      Past Surgical History:   Procedure Laterality Date     kidney stone removal       OVARIAN CYST REMOVAL       TONSILLECTOMY          HPI:    Marlo Kearns is a 37 y.o. 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 Wound was cleaned at bedside . Mri done and didn't show  septic arthropathy, or abscess. Vascular  studies with normal thomas and right great toe pressures adequate for healing . Infectious Disease recommended a combination of cipro flagyl and vancomycin pending cultures( prelim with mssa and Gram negative bacilli).  She underwent debridement by podiatry in the operating room on 9/21/18.   because of her diastolic dysfunction, she was cleared for the procedure beforehand by cardiology. She was transitioned to clindamycin prior to transfer.     Postoperatively, she is strict non weight bearing right lower extremity. Her CRP has decreased from 15.36 at admission to 5.90 on 9/22/18. She was transferred to [facility] on 9/26/18.    Review of Systems:      Pertinent items are noted in HPI.    Constitutional: negative for chills, fatigue, fevers and night sweats  Eyes: negative  Ears, nose, mouth, throat, and face: negative for hearing loss, hoarseness, snoring, sore throat and tinnitus  Respiratory:  shortness of breath + or cough  Cardiovascular: negative for chest pain, chest pressure/discomfort, dyspnea, exertional chest pressure/discomfort, irregular heart beat and lower extremity edema +  Gastrointestinal: negative for abdominal pain, constipation, diarrhea, melena and vomiting  Genitourinary:negative for dysuria and hematuria  Integument/breast: negative for pruritus and rash  Hematologic/lymphatic: negative for bleeding  Musculoskeletal:negative for arthralgias, back pain, myalgias and neck pain  Neurological: negative for dizziness,  headaches, paresthesia, seizures and tremors  Behavioral/Psych: anxiety +, no depression and irritability      Social History Social History     Social History     Marital status: Single     Spouse name: N/A     Number of children: N/A     Years of education: N/A     Social History Main Topics     Smoking status: Former Smoker     Smokeless tobacco: Not on file     Alcohol use Yes - 14 shots of liquor/week     Drug use: Yes     Special: Methamphetamines, Cocaine     Sexual activity: Not Currently     Other Topics Concern     Not on file     Social History Narrative     Patient is single, currently lives with her father and uncle.            Family History Problem Relation Age of Onset     Heart Disease Mother         Heart Disease Brother              Allergies Allergies   Allergen Reactions     Fish Containing Products Anaphylaxis     Can eat seafood or salt water, but reacts to freshwater fish.  Anaphylaxis        Ancef [Cefazolin] Rash     Rash       Keflex [Cephalexin] Rash     Rash      Sulfa (Sulfonamide Antibiotics) Rash     Rash         Medications   Current Facility-Administered Medications:      acetaminophen tablet 650 mg (TYLENOL), 650 mg, Oral, Q4H PRN **OR** acetaminophen solution 650 mg (TYLENOL), 650 mg, Enteral Tube, Q4H PRN, Aura Preston MD     acetaminophen tablet 500 mg (TYLENOL), 500 mg, Oral, Q6H PRN, Aura Preston MD     albuterol nebulizer solution 2.5 mg (PROVENTIL), 2.5 mg, Nebulization, Q4H PRN **AND** Nebulizer treatment intermittent, , , Q4H PRN, Aura Preston MD     [START ON 2018] amLODIPine tablet 10 mg (NORVASC), 10 mg, Oral, QAM, Aura Preston MD     [START ON 2018] ARIPiprazole tablet 5 mg (ABILIFY), 5 mg, Oral, DAILY, Aura Preston MD     bisacodyl suppository 10 mg (DULCOLAX), 10 mg, Rectal, Daily PRN, Aura Preston MD     clindamycin capsule 300 mg (CLEOCIN), 300 mg, Oral, QID, Aura Preston MD      [START ON 9/27/2018] collagenase ointment 1 application, 1 application, Topical, DAILY, Aura Preston MD     dextrose 50 % (D50W) syringe 20-50 mL, 20-50 mL, Intravenous, Once PRN **OR** glucagon (human recombinant) injection 1 mg, 1 mg, Subcutaneous, Once PRN, Aura Preston MD     famotidine tablet 20 mg (PEPCID), 20 mg, Oral, BID, Aura Preston MD     [START ON 9/27/2018] folic acid tablet 1 mg (FOLVITE), 1 mg, Oral, QAM, Aura Preston MD     heparin (PF) subcutaneous injection 5,000 Units, 5,000 Units, Subcutaneous, Q12H 09-21, Aura Preston MD     ipratropium-albuterol 0.5-2.5 mg/3 mL nebulizer solution 3 mL (DUO-NEB), 3 mL, Nebulization, QID **AND** Nebulizer treatment intermittent, , , QID - RT, Aura Preston MD     Lactobacillus rhamnosus GG 1 capsule (CULTURELLE), 1 capsule, Oral, BID CC lunch & supper, Aura Preston MD     lamoTRIgine tablet 150 mg (LaMICtal), 150 mg, Oral, BID, Aura Preston MD     levETIRAcetam tablet 750 mg (KEPPRA), 750 mg, Oral, BID, Aura Preston MD     meclizine tablet 25 mg (ANTIVERT), 25 mg, Oral, TID PRN, Aura Preston MD     metoprolol tartrate tablet 50 mg (LOPRESSOR), 50 mg, Oral, BID, Aura Preston MD     miconazole 2 % barrier cream (SECURA EXTRA THICK), , Topical, BID PRN, Arua Preston MD     miconazole 2 % powder 1 application (MICOTIN), 1 application, Topical, BID, Aura Preston MD     naloxone injection 0.2-0.4 mg (NARCAN), 0.2-0.4 mg, Intravenous, PRN **OR** naloxone injection 0.2-0.4 mg (NARCAN), 0.2-0.4 mg, Intramuscular, PRN, Aura Preston MD     OLANZapine tablet 2.5 mg (zyPREXA), 2.5 mg, Oral, Q8H PRN, Aura Preston MD     ondansetron disintegrating tablet 4 mg (ZOFRAN-ODT), 4 mg, Oral, Q6H PRN, Aura Preston MD     oxyCODONE-acetaminophen 5-325 mg 1-2 tablet (PERCOCET/ENDOCET), 1-2 tablet, Oral, Q4H PRN, Aura Preston MD     potassium chloride CR  "tablet 20 mEq (K-DUR,KLOR-CON), 20 mEq, Oral, BID with meals, Aura Preston MD     torsemide tablet 40 mg (DEMADEX), 40 mg, Oral, BID - diuretic, Aura Preston MD     [START ON 9/27/2018] venlafaxine 24 hr capsule 37.5 mg (EFFEXOR-XR), 37.5 mg, Oral, Daily with brkfst, Aura Preston MD     white petrolatum ointment (AQUAPHOR NATURAL HEALING), , Topical, BID PRN, Aura Preston MD       Physical Exam:  /60 (Patient Position: Lying)  Pulse (!) 55  Temp 98.4  F (36.9  C) (Oral)   Resp 20  Ht 5' 2\" (1.575 m)  SpO2 90%        General Appearance:  appears comfortable, Alert, cooperative, no distress,   Head: Normocephalic, without obvious abnormality, atraumatic  Eyes: PERRL, conjunctiva/corneas clear, EOM's intact, both eyes   Nose: Nares normal, no drainage   Throat: Lips, mucosa, and tongue normal; teeth and gums normal  Neck: Supple, symmetrical, trachea midline, no adenopathy;    Lungs: mild wheezing +, respirations unlabored  Chest Wall: No tenderness or deformity  Heart: Regular rate and rhythm, S1 and S2 normal, no murmur, rubs or gallop, bilat pedal edema +  Abdomen: Soft, non-tender, bowel sounds active all four quadrants,   no masses, no organomegaly  Extremities: wound vac on right lower ext  Pulses: DP pulses are 1-2+ bilat.    Skin: no rashes or lesions  Neurologic: AAO X 3, mild right sided weakness, difficulty  Moving right leg due to pain and vac      LABORATORY/IMAGING  9/19/18  Wbc 6.5, hgb 11.7, plt 215    9/22  K 3.7, cr 0.75  9/18 Na 140, K 3.8, cl,103, bicarb 28, bun 12, cr 0.7, ca 9.1, mag 1.7    CRP 5.9 on 9/22    Imaging:   MR foot right 9/14/18  CONCLUSION:  1.  No evidence for osteomyelitis, septic arthropathy, or abscess.  2.  There is a surface wound along the plantar surface of the foot with  extensive surrounding edema and/or cellulitis. This does demonstrate a fluid  tract which extends to the plantar fascia and plantar musculature but this  appears to " spontaneously drain to the skin surface on series 12, image 9.  3.  There is infectious or inflammatory myositis involving the plantar  musculature.  4.  No foreign body is definitively identified. There is some low T2 signal  intensity with susceptibility artifact which may represent air extending into  the soft tissues from the surface wound. It would be helpful to know the nature  of the material that caused the surface wound and if there is suspicion of  retained foreign body. A metallic foreign body would cause much more  susceptibility artifact and is likely completely excluded.  5.  There is extensive but nonspecific edema or cellulitis along the dorsal  aspect of the foot extending into the lower aspect of the leg.  6.  There is no evidence for fracture.  7.  Extensor hallucis tendinopathy without tearing.  8.  Thickening of the Achilles tendon. This is incompletely visualized but  suggestive of tendinopathy.  9.  Plantar fasciitis.  10.  Exam otherwise negative.    US arterial lower ext  (IMPRESSION:  1. RIGHT LOWER EXTREMITY: Normal resting ankle-brachial index of 1.36. Great toe  pressure adequate for wound healing. Arterial duplex demonstrates widely patent  right lower extremity arterial vasculature. Transition from multiphasic to  monophasic waveforms in the infrapopliteal vessels. Inability to image the  anterior tibial artery as the patient was unable to turn her leg.    2. LEFT LOWER EXTREMITY: Normal resting ankle-brachial index of 1.07. Great toe  pressure adequate for wound healing.    EKG/Echo:   EKG 9/14/18  Normal sinus rhythm  Incomplete right bundle branch block  Nonspecific T wave abnormality  Prolonged QT  Abnormal ECG  When compared with ECG of 19-NOV-2017 23:01,  No significant change was found    Echo 11/21/17  Final Conclusion   Technically challenging echocardiogram. Contrast was used.   Normal left ventricular chamber size.   Calculated left ventricular ejection fraction (modified  Dasilva technique) is 58 %.   Grade 3 left ventricular diastolic dysfunction consistent with severely increased left   ventricular filling pressure.   Trivial mitral valve regurgitation.   Estimated right ventricular systolic pressure is 56 mmHg.   No pericardial effusion.    Microbiology:   tissue cx - 9/21 - gm positive bacilli, anaerobic cx - 1+ gm positive cocci  cx 9/15 - MSSA    Antibiotic -clindamycin    Anticoagulation -HSQ    Assessment and Plan:     Active Hospital Problems   Right foot infection  Wound culture: mostly MSSA. Also has Anaerobic gram positive cocci. Stepped on glass few weeks ago; no foreign bodies on imaging. No osteomyelitis per imaging (MRI). Vascular  studies with normal thomas and right great toe pressures adequate for healing . Initially cipro flagyl and vancomycin. S/p debridement by podiatry in OR on 9/21, antibiotics changed to clindamycin. Wound vac placed  -woc consult  -continue clindamycin X 7 days  -pain meds - tylenol prn, percocet 1 - 2 tab q 4 prn  -collagenase  -NWB right foot - ok for weight bearing for bathroom privileges only     Diastolic dysfunction  Echo in 11/2017 with grade 3 diastolic dysfunction, normal EF   -outpt cardiology follow up   -Daily weights, I and O  -cont torsemide and potassium  -follow electrolytes  -stress test as outpt  -on lopressor    H/o tobacco abuse  H/o chronic alcohol use  No alcohol and cigarettes since admission to [facility] on 9/14. At risk for dependence  -minimize narcotics as able  -nicotine luis prn    Shortness of breath  Hypoxia  Possible COPD/CARMENZA  Has not had sleep study in the past. Does not use o2 at home, some wheezing, chf may be contributing  -o2 as needed  -duoneb four times a day and albuterol prn  -cont diuretics  -cxr in am    Essential hypertension  -cont norvasc, lopressor    Seizure disorder (H)  -cont keppra and lamictal    Borderline personality disorder  -cont abilify, effexor  -zyprexa prn  -psych consult if  needed    Hemiplegia (H)  Cerebral artery occlusion with cerebral infarction (H)  Secondary to ruptured aneurysm  -PT/OT  -antivert prn for dizziness    DM  Hgbaic 5.3 on 9/24  -monitor blood glucose - start ssi if needed  -hypoglycemia protocol    Morbid obesity  -bariatric equipment  -skin cares - woc to follow - micotin three times a day and prn, aquaphor  -cont folvite      Code status:  Full Code - d/w patient at bedside. Father can make decisions if pt unable to do so    Other meds: narcan  GI prophylaxis:pepcid, zofran prn  DVT prophylaxis:HSQ  Bowel regiment: dulcolax prn, culturelle bid  Nutrition: cardiac diet  Activity: as per therapy  Wounds:woc consult    All medication issues identified in the admission drug regimen review have been addressed and completed as appropriate.      Total time spent was 75 minutes in which greater than 50% of the time was spent on face to face time with the patient, patient care coordination, review of chart, and physician to physician phone call.      Aura Preston MD, MPH  [facility]

## 2021-06-20 NOTE — PROGRESS NOTES
"Clinical Nutrition Therapy Assessment Note    Reason for Assessment:   Marlo Kearns is a 37 y.o. female assessed by the registered Dietitian for consult, nutrition risk screen and MD referral.    Nutrition History:  Information from patient, chart and transfer facility.  Diet prior to admission: regular diet & trying to follow low sodium diet at home.  Recent food/fluid intake: good  RD note reviewed @[facility] on 9/24 & healthy diet encouraged  Food allergies or intolerances: fish containing products & banana    Current Nutrition Prescription:   Diet: Cardiac    Current Nutrition Intake:  Good appetite per patient & eating high protein foods with meals. >75% at meals.    Anthropometrics:  Height: 5' 2\" (157.5 cm)  Weight: (!) 468 lb 1.6 oz (212.3 kg)-? This wt  BMI indication: >40 extreme obesity (class 3)  Ideal body weight:110 lb  Weight History:258 lb(4/10/15),360 lb(12/2017),396 lb(9/26/18)    RD Nutrition Focused Physical Exam:  The patient has the following physical signs which could indicate malnutrition: no obvious signs of muscle/fat wasting. Pt with lymphedema    GI Status/Output:   GI symptoms include:  Loose BM noted yesterday    Skin/Wound:  Chidi score Cihdi Scale Score: 16    Right foot (cellulitis) s/p debridement on 9/21 was noted.    Medications:  Note:Torsemide, KCL, Culturelle  Folic Acid    Labs:  Lab Results   Component Value Date/Time    PREALBUMIN 28.4 05/06/2015 07:18 AM    ALBUMIN 3.2 (L) 09/27/2018 09:53 AM     09/27/2018 09:53 AM    K 3.1 (L) 09/27/2018 09:53 AM    BUN 11 09/27/2018 09:53 AM    CREATININE 0.75 09/27/2018 09:53 AM     (H) 09/27/2018 09:53 AM     HgbA1c:5.9(9/22/18)-WNL    Estimated Nutrition Needs:  Assessment weight is 82 kg, adjusted weight    Energy Needs: 0093-8964 kcals daily, 20-22 kcal/kg  Protein Needs: 100-125 g daily, 1.2-1.5 g/kg.  Fluid Needs: 2050 mls daily or per doctor, 25 mls/kg    Malnutrition: Not noted    Nutrition Risk Level: moderate " risk    Nutrition dx:   Obesity grade 3 r/t lifestyle as evidenced by BMI >41.    Goals:   No wt gain    Intervention:   Pt had no questions for me & declines further education at this time has had education before on low sodium and eating healthy which she tries to follow she reported.  Encouraged lean sources of protein that are low in sodium & fruit/vegetables to optimize nutrition for healing.   Adjust diet to 2 gram sodium diet as patient is wanting a little caffeine & she is not able to order on cardiac diet.    Monitoring/Evaluation:   Per protocol      Electronically signed by:  Jimena Patrick RD

## 2021-06-20 NOTE — PROGRESS NOTES
[facility] Hospitalist Daily Progress Note    Summary  Marlo Kearns is a 37 y.o. 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 Wound was cleaned at bedside . Mri done and didn't show  septic arthropathy, or abscess. Vascular  studies with normal thomas and right great toe pressures adequate for healing . Infectious Disease recommended a combination of cipro flagyl and vancomycin pending cultures( prelim with mssa and Gram negative bacilli).  She underwent debridement by podiatry in the operating room on 9/21/18.   because of her diastolic dysfunction, she was cleared for the procedure beforehand by cardiology. She was transitioned to clindamycin prior to transfer.     Postoperatively, she is strict non weight bearing right lower extremity. Her CRP has decreased from 15.36 at admission to 5.90 on 9/22/18. She was transferred to [facility] on 9/26/18.    Assessment/Plan  Right foot infection  Wound culture: mostly MSSA. Also has Anaerobic gram positive cocci. Tissue cx with corynebcterium. Stepped on glass few weeks ago; no foreign bodies on imaging. No osteomyelitis per imaging (MRI). Vascular  studies with normal thomas and right great toe pressures adequate for healing . Initially cipro flagyl and vancomycin. S/p debridement by podiatry in OR on 9/21, antibiotics changed to clindamycin. Wound vac placed  -woc following  -Pt was put on clindamycin 300 four times a day to be continued for 7 days from 9/26. Pt developed a rash on 9/29/18 and clindamycin d/pura and started IV vanco ( D/w ID) end date possibly 10/2  -pain meds -  tylenol prn, percocet 1 - 2 tab q 4 prn  -dakins  -NWB right foot - ok for weight bearing for bathroom privileges only. Off loading boot for commode transfers  -ID consult       Rash  Pt developed a new gen rash on back and chest, itching, possibly from antibiotics - clindamycin d/pura and added to allergy list  -benadryl po prn  -topical benadryl prn  -con consider prednisone if needed, but pt says makes her very anxious    Left arm cellulitis  Pt with scab on left arm with surrounding cellulitis  -started IV vanco  -monitor        Diastolic dysfunction  Bradycardia  Echo in 11/2017 with grade 3 diastolic dysfunction, normal EF   -outpt cardiology follow up   -Daily weights, I and O  -cont torsemide and potassium -  -follow electrolytes  -stress test as outpt  -on lopressor - dose reduced and hold parameters in place     H/o tobacco abuse  H/o chronic alcohol use  No alcohol and cigarettes since admission to [facility] on 9/14. At risk for dependence  -minimize narcotics as able  -nicotine patch prn     Shortness of breath  Hypoxia  Possible COPD/CARMENZA  Has not had sleep study in the past. Does not use o2 at home,  -o2 as needed  -duoneb four times a day and albuterol prn  -cont diuretics  -cxr on 9/27 with edema     Essential hypertension  -cont norvasc, lopressor     Seizure disorder (H)  -cont keppra and lamictal  -seizures precautions     Borderline personality disorder  -cont abilify, effexor  -zyprexa prn  -psych consult if needed     Hemiplegia (H)  Cerebral artery occlusion with cerebral infarction (H)  Secondary to ruptured aneurysm  -PT/OT  -antivert prn for dizziness     DM  Hgbaic 5.3 on 9/24, sugars stable, has not needed insulin  -d/c blood glucose checks  -hypoglycemia protocol     Morbid obesity  -bariatric equipment  -skin cares - woc to follow - micotin three times a day and prn, aquaphor, calmoseptine  -cont folvite      Code status:  Full Code - d/w patient at bedside. Father can make decisions if pt  unable to do so     Other meds: narcan  GI prophylaxis:pepcid, zofran prn, tums prn  DVT prophylaxis:HSQ  Bowel regiment: dulcolax prn, culturelle bid         Subjective:  Pt says doing ok, gen rash on chest and back, itching    Objective:  Vital signs in last 24 hours:  Temp:  [97.9  F (36.6  C)-98.2  F (36.8  C)] 98  F (36.7  C)  Heart Rate:  [58-72] 72  Resp:  [18-20] 20  BP: (109-139)/(53-74) 121/60  Weight:   (!) 380 lb 6.4 oz (172.5 kg)    Intake/Output last 3 shifts:  I/O last 3 completed shifts:  In: 767 [P.O.:240; I.V.:3; IV Piggyback:524]  Out: -   Intake/Output this shift:         Physical Exam:  General Appearance:  appears comfortable.   Alert, cooperative, no distress,   Throat: Oropharynx is clear, moist oral mucosa.   Lungs: Clear to auscultation bilaterally, respirations unlabored.  Heart: Regular rate and rhythm, S1 and S2 normal, no murmur, rubs or gallop, bilat lower extremity edema.  Abdomen: Soft, non-tender to palpation, bowel sounds present.  Extremities: dressing on right lower ext, scab on right arm with surrounding cellultits  Pulses: DP pulses are 1-2+ bilat.    Skin: rash on chest, back  Neuro: AAO X 3, mild right sided weakness, difficulty  Moving right leg due to pain       Imaging and Lab Results - I have personally  reviewed the latest labs and imaging test results    CXR 9/27/18  FINDINGS: Heart and mediastinal size are within normal limits given AP portable technique. There is indistinctness of the pulmonary interstitium, compatible with edema. No lobar infiltrate. No obvious pleural effusion or pneumothorax.    Lines: picc line right arm    Disposition/Follow up pending    Aura Preston MD, MPH  [facility]

## 2021-06-20 NOTE — CONSULTS
Consultation - Infectious Disease  Marlo Kearns,  1980, MRN [id number]    Admitting Dx: Ulceration of right foot    PCP: Kia Villalpando PA-C, [phone]   Code status:  Full Code       Extended Emergency Contact Information  Primary Emergency Contact: Toro Kearns   [facility]  Home Phone: [phone]  Mobile Phone: [phone]  Relation: Father  Secondary Emergency Contact: Shashi Chavez   [place]  Mobile Phone: [phone]  Relation: Significant Other       ASSESSMENT   1. Skin and soft tissue infection R foot with MSSA, anaerobes  2. Drug rash likely clindamycin. Now stopped.  3. Early thrush  4. obesity  Principal Problem:    Right foot infection  Active Problems:    Cerebral artery occlusion with cerebral infarction (H)    Borderline personality disorder    Essential hypertension    Hemiplegia (H)    Physical deconditioning    Diastolic dysfunction    Seizure disorder (H)    Foot infection       PLAN   Agree with vancomycin into tomorrow   Has likely had enough anaerobic coverage with the clindamycin  Agree with prednisone for rash  Oral nystatin -- add fluconazole if not improving.     Damien Wang MD  [facility]  On-Call: [phone]   ______________________________________________________________________        Reason For Consult: Right foot infection       HPI    We have been requested by Dr. Preston to evaluate Marlo Kearns who is a 37 y.o. year old female for the above. We had followed at [facility].   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 on 18 with right foot wound infection.She was started on vancomycin due to allergy to cephalexin, podiatry was  "consulted. Wound was cleaned at bedside. Cultures grew MSSA, and anaerobic infection was suspected as well. . Mri done and didn't show  septic arthropathy, or abscess. Vascular  studies with normal thomas and right great toe pressures adequate for healing . She underwent debridement by podiatry in the operating room on 9/21/18.   because of her diastolic dysfunction, she was cleared for the procedure beforehand by cardiology. She was transitioned to clindamycin prior to transfer.     Postoperatively, she is strict non weight bearing right lower extremity. Her CRP has decreased from 15.36 at admission to 5.90 on 9/22/18. She was transferred to [place] on 9/26/18.    Our last note from 9/25/18 Dr. Foster:  \"ASSESSMENT:   Right foot infection- cSSSI- S/P I&D. Vac in place.   Wound culture: mostly MSSA. Also has Anaerobic gram positive cocci. Should be covered with Clindamycin.   Stepped on glass few weeks ago; no foreign bodies on imaging  No osteomyelitis per imaging (MRI)  Morbid obesity Body mass index is 65.84 kg/m . Morbid obesity affects antibiotic dosing.  CVA, HTN, Bipolar disorder    PLAN:   Continue IV Clindamycin inpatient.   On 9/26, change to PO Clindamycin 300 mg TID for 7 days.   Okay to discharge to [place] from the ID prospective.\"    Developed rash over the past couple of days, so clindamycin was stopped and she was started on vancomycin, had picc placed.     Seen earlier today. The rash is pruritic. Her foot still hurts. 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     Malnutrition (H)     TBI (traumatic brain injury) (H)     Need for prophylactic measure     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     Encephalopathy     Intracerebral hemorrhage (H)     Acute renal failure (H)     Past " Medical History:   Diagnosis Date     Acute renal failure (H)      MEGHA (acute kidney injury) (H)      Aphasia      AV malformation, acquired (H)      AVM (arteriovenous malformation) brain      Bipolar affective (H)      Borderline personality disorder      Cerebral edema (H)      Elevated glucose      Encephalopathy      Encephalopathy      Headache      Hemiparesis (H)      Hypertension      Intraventricular hemorrhage (H)      Morbid obesity (H)      Pneumonia      Polysubstance abuse      Polysubstance abuse      PTSD (post-traumatic stress disorder)      Renal failure      Stroke (H)      Suicidal ideation      UTI (urinary tract infection)     Surgical History  She  has a past surgical history that includes Ovarian cyst removal; kidney stone removal; Tonsillectomy; and PICC (9/29/2018).   Social History  Reviewed, and she  reports that she has quit smoking. She does not have any smokeless tobacco history on file. She reports that she drinks alcohol. She reports that she uses illicit drugs, including Methamphetamines and Cocaine.   Allergies  Allergies   Allergen Reactions     Fish Containing Products Anaphylaxis     Can eat seafood or salt water, but reacts to freshwater fish.  Anaphylaxis        Banana Itching     Clindamycin      rash     Ancef [Cefazolin] Rash     Rash       Keflex [Cephalexin] Rash     Rash      Sulfa (Sulfonamide Antibiotics) Rash     Rash     Family History  family history not pertinent to presenting problem.    Psychosocial Needs  Social History     Social History Narrative     Additional psychosocial needs reviewed per nursing assessment.         Prior to Admission Medications   Prescriptions Prior to Admission   Medication Sig Dispense Refill Last Dose     amLODIPine (NORVASC) 10 MG tablet Take 10 mg by mouth daily.        clindamycin (CLEOCIN) 150 MG capsule Take 300 mg by mouth 4 (four) times a day. x7 days        collagenase ointment Apply 1 application topically daily. To right  foot        Lactobacillus rhamnosus GG (CULTURELLE) 15 billion cell CpSP Take 1 capsule by mouth 2 (two) times a day with meals.        meclizine (ANTIVERT) 25 mg tablet Take 25 mg by mouth 3 (three) times a day as needed for dizziness.        oxyCODONE-acetaminophen (PERCOCET/ENDOCET) 5-325 mg per tablet Take 1-2 tablets by mouth every 4 (four) hours as needed for pain (pain scale 1-5=1 tablet, pain scale 6-10=2 tablets).        potassium chloride (K-DUR,KLOR-CON) 20 MEQ tablet Take 20 mEq by mouth 2 (two) times a day with meals.        torsemide (DEMADEX) 20 MG tablet Take 40 mg by mouth 2 (two) times a day at 9am and 6pm.        acetaminophen (TYLENOL) 500 MG tablet Take 500 mg by mouth every 6 (six) hours as needed.    Taking     ARIPiprazole (ABILIFY) 5 MG tablet Take 1 tablet (5 mg total) by mouth daily. 30 tablet 6      famotidine (PEPCID) 20 MG tablet Take 20 mg by mouth 2 (two) times a day.    Taking     folic acid (FOLVITE) 1 MG tablet Take 1 mg by mouth.   Taking     lamoTRIgine (LAMICTAL) 150 MG tablet Take 150 mg by mouth 2 (two) times a day.   Taking     levETIRAcetam (KEPPRA) 500 MG tablet Take 750 mg by mouth 2 (two) times a day.    Taking     metoprolol tartrate (LOPRESSOR) 50 MG tablet Take 50 mg by mouth 2 (two) times a day.   Taking     OLANZapine (ZYPREXA) 2.5 MG tablet TAKE 1 TABLET BY MOUTH THREE TIMES A DAY AS NEEDED FOR AGITATION 90 tablet 3      venlafaxine (EFFEXOR-XR) 37.5 MG 24 hr capsule Take 1 capsule (37.5 mg total) by mouth daily. 30 capsule 6           Anti-infectives: last clindamycin 9/29  Vancomycin 9/29-  Cultures: reviewed from [place]  Imaging:           Review of Systems:  All other systems negative in detail except what is noted above. Physical Exam:  Temp:  [97.9  F (36.6  C)-98.8  F (37.1  C)] 97.9  F (36.6  C)  Heart Rate:  [68-90] 90  Resp:  [18-20] 20  BP: (108-152)/(53-75) 152/75    GENERAL:  In no acute distress, is alert and oriented to person, place, time.  EYES: No  conjunctival injection, pupils equally reactive to light, extra-ocular movement intact  HEAD, EARS, NOSE, MOUTH, AND THROAT: mouth erythematous without clear thrush but there is mild angular cheilitis.   RESPIRATORY: Clear breath sounds to auscultation bilaterally.   CARDIOVASCULAR: Regular rate and rhythm, normal S1 and S2, no murmurs, rubs, or gallops.  ABDOMEN: Soft, nontender, obese. Normal bowel sounds.  MUSCULOSKELETAL: No synovitis.  LYMPHATIC: No cervical, supraclavicular, axillary, or inguinal lymphadenopathy.  SKIN/HAIR/NAILS: diffuse rash maculopapular--trunk, upper and lower extremties  Wound on R foot -- into soft tissue without pus or erythema.   NEUROLOGIC: Grossly intact.  PICC R UE       Pertinent Labs      Results from last 7 days  Lab Units 09/30/18  0643 09/28/18  0951 09/27/18  0953   LN-WHITE BLOOD CELL COUNT thou/uL 7.4  --  6.3   LN-HEMOGLOBIN g/dL 11.5*  --  11.9*   LN-HEMATOCRIT % 35.6  --  36.5   LN-PLATELET COUNT thou/uL 176 181 215            Results from last 7 days  Lab Units 10/01/18  0742 09/30/18  0643 09/29/18  0828 09/28/18  0706  09/27/18  0953   LN-SODIUM mmol/L  --  136  --  140  --  139   LN-POTASSIUM mmol/L 3.7 4.5 4.0 4.0  4.0  < > 3.1*   LN-CHLORIDE mmol/L  --  98  --  99  --  93*   LN-CO2 mmol/L  --  27  --  30  --  36*   LN-BLOOD UREA NITROGEN mg/dL  --  11  --  10  --  11   LN-CREATININE mg/dL  --  0.73  --  0.69  --  0.75   LN-CALCIUM mg/dL  --  9.3  --  9.2  --  9.7   < > = values in this interval not displayed.    Lab Results   Component Value Date    ALT 18 09/27/2018    AST 24 09/27/2018    ALKPHOS 48 09/27/2018    BILITOT 0.5 09/27/2018        No results found for: CRP   No results found for: SEDRATE     Pertinent Radiology  Radiology Results: Reviewed  Xr Chest 1 View Portable    Result Date: 9/27/2018  XR CHEST 1 VIEW PORTABLE 9/27/2018 8:20 AM INDICATION: SOB. COMPARISON: None. FINDINGS: Heart and mediastinal size are within normal limits given AP portable  technique. There is indistinctness of the pulmonary interstitium, compatible with edema. No lobar infiltrate. No obvious pleural effusion or pneumothorax.     Xr Chest 1 View For Picc Placement Portable    Result Date: 9/29/2018  XR CHEST 1 VIEW FOR PICC LINE PLACEMENT PORTABLE 9/29/2018 8:08 PM INDICATION: Verify catheter placement COMPARISON: 09/27/2018 FINDINGS: PICC line in the SVC. Chest otherwise negative.

## 2021-06-20 NOTE — PROGRESS NOTES
[place] Hospitalist Daily Progress Note    Summary  Marlo Kearns is a 37 y.o. 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 Wound was cleaned at bedside . Mri done and didn't show  septic arthropathy, or abscess. Vascular  studies with normal thomas and right great toe pressures adequate for healing . Infectious Disease recommended a combination of cipro flagyl and vancomycin pending cultures( prelim with mssa and Gram negative bacilli).  She underwent debridement by podiatry in the operating room on 9/21/18.   because of her diastolic dysfunction, she was cleared for the procedure beforehand by cardiology. She was transitioned to clindamycin prior to transfer.     Postoperatively, she is strict non weight bearing right lower extremity. Her CRP has decreased from 15.36 at admission to 5.90 on 9/22/18. She was transferred to [place] on 9/26/18.    Assessment/Plan  Right foot infection S/p debridement by podiatry in OR on 9/21, discontinued abx on 10/2 due to rash  -WOC  -NWB right foot until seen by podiatry as outpt- ok for weight bearing for bathroom privileges only. Off loading boot for commode transfers  - has appt with vascular center/podiatry on 10/18 with Dr. Kristina Rich    Diastolic dysfunction  Bradycardia  Echo in 11/2017 with grade 3 diastolic dysfunction, normal EF, wt down to 363lb (down 17lbs since admission), great diuresis, O almost = I since admission, prerenal azotemia developing  -outpt cardiology follow up, stress test as outpt  -Daily weights, I and O  -decrease torsemide to 40 mg twice  daily  -Continue Lopressor and potassium  -Recheck BMP in am    H/o tobacco abuse  H/o chronic alcohol use  No alcohol and cigarettes since admission to [place] on 9/14. At risk for dependence  -minimize narcotics as able  -nicotine patch prn     Possible COPD/CARMENZA  Has not had sleep study in the past. Does not use O2 at home.  -duoneb four times a day and albuterol prn  -cont diuretics to keep dry (see above)     Essential hypertension  Acceptable control overall.  -continue current meds     Seizure disorder (H)  -cont keppra and lamictal  -seizure precautions     Borderline personality disorder  -cont abilify, effexor, Zyprexa prn      Hemiplegia (H)  Cerebral artery occlusion with cerebral infarction (H)  Secondary to ruptured aneurysm  -PT/OT  -antivert prn for dizziness     Other medical issues:   Morbid obesity  Extensive maculopapular Rash-improved.  Itchy.  Likely due to clindamycin, s/p prednisone.  -schedule benadryl  Left arm cellulitis, improved.  DM2.  Hgbaic 5.3 on 9/24, requiring minimal insulin sliding scale.  Accucheck as needed.        Code status:  Full Code - d/w patient at bedside. Father can make decisions if pt unable to do so       GI prophylaxis:pepcid  DVT prophylaxis:HSQ    Subjective:  Itchy, would like benadryl restarted and scheduled, wonders about dc plan and if she can get more pain meds with the debridement on Tuesday.    Objective:  Vital signs in last 24 hours:  Temp:  [98  F (36.7  C)-98.6  F (37  C)] 98.5  F (36.9  C)  Heart Rate:  [64-78] 78  Resp:  [16-18] 16  BP: (103-129)/(50-61) 129/61  Weight:   (!) 363 lb 1.6 oz (164.7 kg)    Intake/Output last 3 shifts:  I/O last 3 completed shifts:  In: 1545 [P.O.:1525; I.V.:20]  Out: 2250 [Urine:2250]  Intake/Output this shift:  I/O this shift:  In: 595 [P.O.:595]  Out: 1000 [Urine:1000]    Physical Exam:  General Appearance: NAD, sitting on side of bed  Throat: Moist oral mucosa  Lungs: CTAB  Heart: Regular rate and rhythm, bilat LE with  chronic lymphedema  Abdomen: Soft, non-tender to palpation, nd  Extremities: dressing on right lower ext  Skin: Excoriated, dry  Neuro: alert and oriented x 3, nonfocal      Imaging and Lab Results - I have personally  reviewed the latest labs and imaging test results  Recent Results (from the past 24 hour(s))   Basic Metabolic Panel   Result Value Ref Range    Sodium 139 136 - 145 mmol/L    Potassium 3.8 3.5 - 5.0 mmol/L    Chloride 97 (L) 98 - 107 mmol/L    CO2 32 (H) 22 - 31 mmol/L    Anion Gap, Calculation 10 5 - 18 mmol/L    Glucose 93 70 - 125 mg/dL    Calcium 9.5 8.5 - 10.5 mg/dL    BUN 14 8 - 22 mg/dL    Creatinine 0.77 0.60 - 1.10 mg/dL    GFR MDRD Af Amer >60 >60 mL/min/1.73m2    GFR MDRD Non Af Amer >60 >60 mL/min/1.73m2     Discussed with RN    Disposition/Follow up:  Medically ready for dc to TCU, search underway, anticipate next week.  Obesity will likely limit her options.    All medication issues identified within the last 24 hours have been addressed and completed as appropriate.    Latonya De La Rosa MD  Internal Medicine  [facility]

## 2021-06-20 NOTE — PROGRESS NOTES
INPATIENT CLINICAL SOCIAL WORK PSYCHOSOCIAL ASSESSMENT    PRIMARY DECISION MAKER FOR HEALTHCARE  Primary Decision Maker: Patient    EMERGENCY CONTACT/S  Name Home Phone Work Phone Mobile Phone Relationship Lgl Grd   AXEL KEARNS [phone] Father    SHASHI LO   [phone] Significant*        HEALTHCARE DIRECTIVE  Advance Directive: Patient does not have advance directive  Advance Directive Information Requested?: Patient would not like information (Pt stated she would like her father, Axel and her S/O Shashi as her surrogate decision makers if needed. Pt declined all HD information offered. )    PRESENTING REASON FOR HOSPITALIZATION:  Marlo Kearns is a 37 y.o. 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 Wound was cleaned at bedside . Mri done and didn't show  septic arthropathy, or abscess. Vascular  studies with normal thomas and right great toe pressures adequate for healing . Infectious Disease recommended a combination of cipro flagyl and vancomycin pending cultures( prelim with mssa and Gram negative bacilli).  She underwent debridement by podiatry in the operating room on 9/21/18.   because of her diastolic dysfunction, she was cleared for the procedure beforehand by cardiology. She was transitioned to clindamycin prior to transfer.     Postoperatively, she is strict non weight bearing right lower extremity. Her CRP has decreased from 15.36 at  admission to [facility] on 9/22/18. She was transferred to [place] on 9/26/18.    LEGAL  Legal Status: None    FINANCIAL  Payor/s     Payor Plan    MEDICAID [place]ICAID [place]      COBRA Eligible?: No  VA Benefits? : No     Medical Assistance: Yes  [facility] Financial Responsibility: [place] (Pt has an ARMS Worker through [employer] )  Referral to Financial SW: No    OCCUPATION / EDUCATION  Type of Employment: Disability   Type of Occupation: Due to Mental Health issues and history of stroke (3 years ago)  Highest Education: College (Some college from [employer])    SPIRITUAL / CULTURAL  Identified Scientology: Pentecostal (Pt watches [employer] services and [employer])  Traditions/Cultural Practices that Help: No    CHEMICAL /PSYCHIATRIC BEHAVIORAL HEALTH  Concerns of substance / chemical use? : Yes, No (Pt endorses using Meth and Alcohol by history, pt has been sober for nearly 4 years. )  Previous Treatment?: Yes Treatment Location and Date: 2014 Length of sobriety following treatment: Nearly 4 years  Concerns for mental/behavioral wellbeing? : No (Pt see's Psychiatrist at  and currently is on Abilify and Effexor for Bipolar disorder and personality disorder. )    HOME / LIVING ENVIRONMENT  Living Arrangements: Spouse/significant other, Family members (Pt lives with her father Toro, S/O Shashi and her uncle lives at the home at times as well. )     Type of Home/Layout: Single family home, can live on one level, 2 story house    DAILY ROUTINE / FUNCTIONAL STATUS  ADL / IADL Limits: Yes ADL Limits : gait disturbance, med assist, stairs, transferring, transportation, bathing, cognitive mobility  Drives?: No-Who drives you to appt? (Comment) (Father and S/O as well as Medicaid transportation benefit.)       COMMUNITY SERVICES  Community Services : Case Management (ARMS Worker: Winter Wallis at [employer] [phone])  Primary Care Provider: Kia Villalpando PA-C    FAMILY / SOCIAL  SUPPORT  Primary Caregiver: Self  Identified Primary Caregiver in anticipation of a discharge home with aftercare needs: Self, Family, Spouse  Support Systems: Spouse/significant other, Parent    ANTICIPATED DISCHARGE NEEDS  Anticipated discharge needs: TBD, TCU  SW will partner with pt & family on discharge plans. Will provide them a list of Medicare-certified SNFs and/or HHAs once post-acute care needs are determined.    SUBJECTIVE ASSESSMENT  A What to Expect Meeting was held today with pt, SCARLET Villaseñor and this writer were present. Discussed LTACH criteria and goals for pt's admission to . Explained Hospitalist team and schedule, nursing protocols and targeted discharge date of 10/16-10/20. AZEB Scheduled a Care Progression Meeting for 10/06/18 at 1430 hrs in pt's room.     SW remained with pt following WTE Meeting and completed a Psychosocial Assessment. Pt was pleasantly engaged in assessment with AZEB and stated she was here at  following her stroke three years ago. Pt stated her goal for discharge is to return home, pt did not believe her MA would cover TCU stay. SW advised that likely a TCU stay would be covered, SW will verify pt's SNF/TCU benefits and advise pt of information when known. Pt shared that she enjoys watching TV, coloring and socializing with people. Pt stated she likes to talk and when she is on her MH medication it is much easier to socialize and think. Pt is agreeable to partnering with AZEB for discharge planning. SW will remain available for resources and emotional support as well.     PLAN / FOLLOW-UP  SW will continue to follow pt and family for psychosocial and emotional support while pt is hospitalized at . SW will help coordinate discharge once pt is medically stable and no longer needing a LTACH level of care. SW will provide resources as needed or requested.    MARY Oneal

## 2021-06-20 NOTE — PROGRESS NOTES
Spiritual Health Services Note:    Spiritual Assessment:  I reviewed Marlo's chart as appropriate before going to see her. She was with another member of her care team when I went by her room and was unavailable to visit. I was unable to update her spiritual assessment at this time.    Care Provided:  Reviewed Marlo's chart. Attempted to provide spiritual support.    Plan of Care:  I will attempt to follow up with Marlo at a later time. I will continue to make additional visits as requested by Marlo, her family or other visitors, and as referred by staff.       Shay Khalil MDiv.  Lead    [phone]

## 2021-06-20 NOTE — PROGRESS NOTES
"Pharmacy Consult: Vancomycin Dosing          Other current antimicrobials             vancomycin 2 g in sodium chloride 0.9% 500 mL (VANCOCIN)  Every 12 hours             Subjective/Objective:    Patient was admitted for Right foot infection on 9/26/2018    Height: 5' 2\" (1.575 m)    Actual Body Weight (ABW): (!) 172.5 kg (380 lb 6.4 oz)    Ideal body weight: 50.1 kg (110 lb 7.2 oz)  Adjusted ideal body weight: 99.1 kg (218 lb 6.9 oz)    BMI: Body mass index is 69.58 kg/(m^2).    Allergies   Allergen Reactions     Fish Containing Products Anaphylaxis     Can eat seafood or salt water, but reacts to freshwater fish.  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     Hemiplegia (H)     Need for prophylactic measure     Physical deconditioning     Malnutrition (H)     TBI (traumatic brain injury) (H)     Unspecified episodic mood disorder     Diastolic dysfunction     Seizure disorder (H)     Right foot infection     Foot infection    Past Medical History:   Diagnosis Date     Acute renal failure (H)      MEGHA (acute kidney injury) (H)      Aphasia      AV malformation, acquired (H)      AVM (arteriovenous malformation) brain     left, ruptured     Bipolar affective (H)      Borderline personality disorder      Cerebral edema (H)      Elevated glucose      Encephalopathy      Encephalopathy      Headache      Hemiparesis (H)      Hypertension      Intraventricular hemorrhage " (H)      Morbid obesity (H)      Pneumonia      Polysubstance abuse      Polysubstance abuse      PTSD (post-traumatic stress disorder)      Renal failure      Stroke (H)      Suicidal ideation      UTI (urinary tract infection)         Temp Readings from Current Encounter:     09/28/18 1526 09/28/18 2316 09/29/18 0718   Temp: 97.9  F (36.6  C) 97.9  F (36.6  C) 97.6  F (36.4  C)     Net Intake/Output (last 24 hours):  I/O last 3 completed shifts:  In: 988 [P.O.:988]  Out: -     Recent Labs      09/27/18   0953  09/28/18   0706   WBC  6.3   --    BUN  11  10   CREATININE  0.75  0.69     Estimated Creatinine Clearance: 171.3 mL/min (by C-G formula based on Cr of 0.69).    Recent Labs      09/27/18   1030   CULTURE  No MRSA isolated       Results for orders placed or performed during the hospital encounter of 09/26/18   Obtain MRSA screen of nares    Collection Time: 09/27/18 10:30 AM   Result Value Status    Culture No MRSA isolated Final       No results for input(s): VANCOMYCIN in the last 168 hours.    Vancomycin administrations: (last 120 hours)     None          Assessment/Plan:    Cellulitis of right foot.   Multiple abx allergies  ID consult pending      1. Max dose vancomycin 2g iv q12h   (Abw = 172kg)  2. vanco level 10/1 afternoon  3. Pharmacist will continue to follow.    Thank you for the consult.  Ramiro Moon [facility] 9/29/2018 12:19 PM

## 2021-06-20 NOTE — PROGRESS NOTES
"Clinical Nutrition Therapy Reassessment Note    Reason for Reassessment:   Per protocol/pathway.    Subjective:  Pt resting however willing to talk.  Discussed protein modular Rusty with patient to help promote wound healing which she did agree to take. Otherwise patient had no concerns or questions for me pertaining her special diet.    Nutrition History:  Food allergies or intolerances: fish containing products & banana    Current Nutrition Prescription:   Diet: 2 gram Na+    Current Nutrition Intake:  Good appetite consuming 100% of meals. Average intake daily the past week ~2200 kcal, 85 grams protein   Meeting >75% of estimated protein needs.    Anthropometrics:  Height: 5' 2\" (157.5 cm)  Weight: (!) 378 lb 3.2 oz (171.6 kg),10/3-? This wt  BMI:69  BMI indication: >40 extreme obesity (class 3)  Ideal body weight:110 lb  Weight History:258 lb(4/10/15),360 lb(12/2017),396 lb(9/26/18)  Most recent wt's:386 lb(9/30), 388 lb(10/1)   *overall trending down with diuresing    GI Status/Output:   GI symptoms include:  Loose BM x 2 noted today. 2-3 BM's noted daily past few days    Skin/Wound:  Chdii score Chidi Scale Score: 17    Right foot (cellulitis) s/p debridement on 9/21 was noted. WOC note reviewed on 9/27    Medications:  Note:Torsemide, KCL, Culturelle  Folic Acid    Labs:  Reviewed.  FSBG  mg/dL in the past 24 hrs-in acceptable range  HgbA1c:5.9(9/22/18)-WNL    Estimated Nutrition Needs:  Assessment weight is 82 kg, adjusted weight    Energy Needs: 3927-0528 kcals daily, 20-22 kcal/kg  Protein Needs: 100-125 g daily, 1.2-1.5 g/kg.  Fluid Needs: 2050 mls daily or per doctor, 25 mls/kg    Malnutrition: Not noted    Nutrition Risk Level: moderate risk    Nutrition dx:   Obesity grade 3 r/t lifestyle as evidenced by BMI >41.  Increased protein needs r/t wound healing as evidenced by right plantar surgical foot wound.    Goals:   No wt gain-progressing  Wound healing-in progress    Intervention:   Add Rusty " daily which provides: 80 kcal, 16.5 grams protein, 4 grams CHO to help meet protein needs better for wound healing.  Recommend MVI w/minerals daily per wound care protocol-sticky note left for doctor    Monitoring/Evaluation:   Labs, wt, po/supplement intake, and wound care notes.      Electronically signed by:  Jimena Patrick RD

## 2021-06-20 NOTE — LETTER
Letter by Micki Santamaria MD at      Author: Micki Santamaria MD Service: -- Author Type: --    Filed:  Encounter Date: 7/15/2020 Status: (Other)         07/15/20      Marlo EVERT Kearns  [address]  [place] [address]    Dear Marlo,    Below you will find the details for your upcoming appointment with [facility]:     -Monday 10/26/20 at 2:45PM for a consultation with Dr. Micki Santamaria    We are located in the [facility] at: [address], [address], [place], [address]  When you arrive to the clinic, please call: [phone]. The staff will then virtually check you in and meet you at the door to escort you to a room. If you arrive early and a room is not yet ready for you staff may have you wait and can call you back when they are ready. We ask that you do not bring any visitors with you to the clinic and that you wear a mask to your appointment. If you are having any symptoms of a cough, fever, rash, loss of taste and smell, or shortness of breath we ask that you please call and reschedule your appointment.  If you have any questions or concerns, please call our office at: [phone].     Sincerely,      [facility]

## 2021-06-21 NOTE — PROGRESS NOTES
[facility] Hospitalist Daily Progress Note    Summary  Marlo Kearns is a 37 y.o. 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 Wound was cleaned at bedside . Mri done and didn't show  septic arthropathy, or abscess. Vascular  studies with normal thomas and right great toe pressures adequate for healing . Infectious Disease recommended a combination of cipro flagyl and vancomycin pending cultures( prelim with mssa and Gram negative bacilli).  She underwent debridement by podiatry in the operating room on 9/21/18.   because of her diastolic dysfunction, she was cleared for the procedure beforehand by cardiology. She was transitioned to clindamycin prior to transfer.     Postoperatively, she is strict non weight bearing right lower extremity. Her CRP has decreased from 15.36 at admission to 5.90 on 9/22/18. She was transferred to [place] on 9/26/18.    Assessment/Plan  Right foot infection S/p debridement by podiatry in OR on 9/21, discontinued abx on 10/2 due to rash, s/p bedside debridement with Dr. Casanova 10/9.  -WOC, wet to moist dressings recommended  -NWB right foot until seen by podiatry as outpt, ok for weight bearing for bathroom privileges only, off loading boot for commode transfers  - has appt with vascular center/podiatry on 10/18 with Dr. Kristina Rich  -continue hydrocortisone cream for pruritic rash    Diastolic dysfunction  Bradycardia  Echo in 11/2017 with grade 3 diastolic dysfunction, normal EF, wt 367lb (down 13 lbs since admission).  BMP remains stable.  -outpt cardiology follow up, stress test as  outpt  -Daily weights, I and O  -continue torsemide 40 mg twice daily  -Continue Lopressor and potassium    H/o tobacco abuse /  H/o chronic alcohol use  No alcohol and cigarettes since admission to [place] on 9/14.  At risk for dependence.  -minimize narcotics as able  -nicotine patch prn     Possible COPD/CARMENZA  Has not had sleep study in the past. Does not use O2 at home.  -albuterol nebs prn  -cont diuretics to keep dry (see above)  -sleep study as outpatient     Essential hypertension  Acceptable control.   anticipation/anxiety/pain.  -continue current meds     Seizure disorder (H)  -cont keppra and lamictal  -seizure precautions     Borderline personality disorder  -cont abilify, effexor, Zyprexa prn      Hemiplegia (H)  Cerebral artery occlusion with cerebral infarction (H)  Secondary to ruptured aneurysm  -PT/OT  -antivert prn for dizziness    Extensive maculopapular Rash-improved.  Remains itchy, likely due to clindamycin, s/p prednisone.  -continue scheduled benadryl and hydrocortisone cream    Left arm cellulitis, improved.    DM2.  Hgbaic 5.3 on 9/24, requiring minimal insulin sliding scale.  Accucheck as needed.        Code status:  Full Code - d/w patient at bedside. Father can make decisions if pt unable to do so       GI prophylaxis:pepcid  DVT prophylaxis:HSQ    Subjective:  Patient seen and examined, no active complaint.  States appetite is good and denies nausea or vomiting.  She is afebrile.  Wondering when she will be transferred to TCU, explained to the  still looking for a facility.    Objective:  Vital signs in last 24 hours:  Temp:  [98  F (36.7  C)-98.9  F (37.2  C)] 98  F (36.7  C)  Heart Rate:  [73-80] 73  Resp:  [16-18] 18  BP: (120-129)/(58-70) 121/58  Weight:   (!) 367 lb 1.6 oz (166.5 kg)    Intake/Output last 3 shifts:  I/O last 3 completed shifts:  In: 1365 [P.O.:1325; I.V.:40]  Out: 575 [Urine:575]  Intake/Output this shift:  I/O this shift:  In: 400 [P.O.:400]  Out: 200  [Urine:200]    Physical Exam:  General Appearance: NAD, sitting up in chair, just got off commode.  Throat: Moist oral mucosa  Lungs: CTAB  Heart: Regular rate and rhythm, bilat LE with chronic lymphedema  Abdomen: Soft, non-tender to palpation, nd  Extremities: Dressing on right lower ext CDI  Skin: Excoriated, dry, erythematous patches on chest and arms evidence of recent scratching mildly improved.  Neuro: Alert and oriented x 3, nonfocal      Imaging and Lab Results - I have personally  reviewed the latest labs and imaging test results  No results found for this or any previous visit (from the past 24 hour(s)).    D/w patient, , and covering RN    Disposition/Follow up:  Medically ready for dc to TCU, when available.  Obesity will likely limit her options.    All medication issues identified within the last 24 hours have been addressed and completed as appropriate.    Idania Brothers MD  Internal Medicine

## 2021-06-21 NOTE — PROGRESS NOTES
[facility] Hospitalist Daily Progress Note    Summary  Marlo Kearns is a 37 y.o. 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 Wound was cleaned at bedside . Mri done and didn't show  septic arthropathy, or abscess. Vascular  studies with normal thomas and right great toe pressures adequate for healing . Infectious Disease recommended a combination of cipro flagyl and vancomycin pending cultures( prelim with mssa and Gram negative bacilli).  She underwent debridement by podiatry in the operating room on 9/21/18.   because of her diastolic dysfunction, she was cleared for the procedure beforehand by cardiology. She was transitioned to clindamycin prior to transfer.      Assessment/Plan  Right foot infection S/p debridement by podiatry in OR on 9/21, discontinued abx on 10/2 due to rash, s/p bedside debridement with Dr. Casanova 10/9.  -WOC, cont with acetic acid irrigation to wound, s/p bedside I&D by plastic on 10/16. appt with podiatry on 10/18 with Dr. Kristina Rich - started santyl to wound daily, they are applying with insurance for primatrix skin graft approval.  Follow up on 11/15 at 1pm, cont current nwb status  -NWB right foot, ok for weight bearing for bathroom privileges only, off loading boot for commode transfers    bilat shoulder pain  - lidoderm patch didn't work, started on diclofenac gel-which seemed to help    Diastolic dysfunction  H/o Bradycardia  Echo in 11/2017 with grade 3 diastolic dysfunction, normal EF,  -outpt cardiology follow up, stress test as outpt  -continue torsemide 40 mg twice daily, check bmp  in am  -Continue Lopressor and potassium    H/o tobacco abuse /  H/o chronic alcohol use  No alcohol and cigarettes since admission to [place] on 9/14.  At risk for dependence.  -minimize narcotics as able  -nicotine patch prn     Possible COPD/CARMENZA  Has not had sleep study in the past. Does not use O2 at home.  -albuterol nebs prn  -cont diuretics to keep dry (see above)  -sleep study as outpatient     Essential hypertension  Acceptable control.   anticipation/anxiety/pain.  -continue current meds     Seizure disorder (H)  -cont keppra and lamictal  -seizure precautions     Borderline personality disorder  -cont abilify, effexor, Zyprexa prn      Hemiplegia (H)  Cerebral artery occlusion with cerebral infarction (H)  Secondary to ruptured aneurysm  -PT/OT  -antivert prn for dizziness    Extensive maculopapular Rash-resolved.  Improving, likely due to clindamycin, s/p prednisone.  DM2.  Hgbaic 5.3 on 9/24, requiring minimal insulin sliding scale.  Accucheck as needed.        Code status:  Full Code - d/w patient at bedside. Father can make decisions if pt unable to do so       GI prophylaxis:pepcid  DVT prophylaxis:HSQ    Subjective:  Shoulder pain is improving with diclofenac gel.        Objective:  Vital signs in last 24 hours:  Temp:  [98.1  F (36.7  C)-98.5  F (36.9  C)] 98.1  F (36.7  C)  Heart Rate:  [70-92] 72  Resp:  [16-18] 18  BP: (116-174)/(56-84) 129/61  Weight:   (!) 376 lb (170.6 kg)    Intake/Output last 3 shifts:  I/O last 3 completed shifts:  In: 1440 [P.O.:1440]  Out: -   Intake/Output this shift:       Physical Exam:  General Appearance: NAD in chair   Throat: no thrush   Lungs: CTAB  Heart: Regular rate and rhythm, bilat LE with chronic lymphedema  Abdomen: Soft, nttp, ND  Extremities: Dressing on right lower ext  Skin:dry skin on arms, no other new rashes  Neuro: Alert and oriented x 3, nonfocal      Imaging and Lab Results - I have personally  reviewed the latest labs and imaging test results  No  results found for this or any previous visit (from the past 24 hour(s)).    D/w patient, and covering RN    Disposition/Follow up:  Medically ready for dc to TCU, when available.        All medication issues identified within the last 24 hours have been addressed and completed as appropriate.

## 2021-06-21 NOTE — PROGRESS NOTES
[place] Hospitalist Daily Progress Note    Summary  Marlo Kearns is a 37 y.o. 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 Wound was cleaned at bedside . Mri done and didn't show  septic arthropathy, or abscess. Vascular  studies with normal thomas and right great toe pressures adequate for healing . Infectious Disease recommended a combination of cipro flagyl and vancomycin pending cultures( prelim with mssa and Gram negative bacilli).  She underwent debridement by podiatry in the operating room on 9/21/18.   because of her diastolic dysfunction, she was cleared for the procedure beforehand by cardiology. She was transitioned to clindamycin prior to transfer.     Postoperatively, she is strict non weight bearing right lower extremity. Her CRP has decreased from 15.36 at admission to 5.90 on 9/22/18. She was transferred to [place] on 9/26/18.    Assessment/Plan  Right foot infection S/p debridement by podiatry in OR on 9/21, discontinued abx on 10/2 due to rash, s/p bedside debridement with Dr. Casanova 10/9.  -WOC, cont with acetic acid irrigation to wound, s/p bedside I&D by plastic on 10/16. keep appt with podiatry on 10/18 to address wb status  -NWB right foot until seen by podiatr, ok for weight bearing for bathroom privileges only, off loading boot for commode transfers  -has appt with vascular center/podiatry on 10/18 with Dr. Kristina Rich    Diastolic dysfunction  Bradycardia  Echo in 11/2017 with grade 3 diastolic dysfunction, normal EF,  -outpt cardiology follow up, stress test as outpt  -continue torsemide 40  mg twice daily, check bmp in am  -Continue Lopressor and potassium    H/o tobacco abuse /  H/o chronic alcohol use  No alcohol and cigarettes since admission to [place] on 9/14.  At risk for dependence.  -minimize narcotics as able  -nicotine patch prn     Possible COPD/CARMENZA  Has not had sleep study in the past. Does not use O2 at home.  -albuterol nebs prn  -cont diuretics to keep dry (see above)  -sleep study as outpatient     Essential hypertension  Acceptable control.   anticipation/anxiety/pain.  -continue current meds     Seizure disorder (H)  -cont keppra and lamictal  -seizure precautions     Borderline personality disorder  -cont abilify, effexor, Zyprexa prn      Hemiplegia (H)  Cerebral artery occlusion with cerebral infarction (H)  Secondary to ruptured aneurysm  -PT/OT  -antivert prn for dizziness    Extensive maculopapular Rash-improved.  Improving, likely due to clindamycin, s/p prednisone.    DM2.  Hgbaic 5.3 on 9/24, requiring minimal insulin sliding scale.  Accucheck as needed.        Code status:  Full Code - d/w patient at bedside. Father can make decisions if pt unable to do so       GI prophylaxis:pepcid  DVT prophylaxis:HSQ    Subjective:  Wound was debrided today nothing new.        Objective:  Vital signs in last 24 hours:  Temp:  [97.3  F (36.3  C)-97.6  F (36.4  C)] 97.3  F (36.3  C)  Heart Rate:  [75-94] 83  Resp:  [12-24] 18  BP: (118-138)/(59-68) 126/59  Weight:   (!) 369 lb 1.6 oz (167.4 kg)    Intake/Output last 3 shifts:  I/O last 3 completed shifts:  In: 440 [P.O.:440]  Out: 500 [Urine:500]  Intake/Output this shift:       Physical Exam:  General Appearance: NAD lying in bed  Throat: Moist oral mucosa no thrush   Lungs: CTAB  Heart: Regular rate and rhythm, bilat LE with chronic lymphedema-overall improved  Abdomen: Soft, nttp, ND  Extremities: Dressing on right lower ext  Skin: no new skin rash noted on limited exam   Neuro: Alert and oriented x 3, nonfocal      Imaging and Lab  Results - I have personally  reviewed the latest labs and imaging test results  No results found for this or any previous visit (from the past 24 hour(s)).    D/w patient, and covering RN    Disposition/Follow up:  Medically ready for dc to TCU, when available.  Obesity will likely limit her options.    All medication issues identified within the last 24 hours have been addressed and completed as appropriate.

## 2021-06-21 NOTE — PROGRESS NOTES
[facility] Hospitalist Daily Progress Note    Summary  Marlo Kearns is a 37 y.o. 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 Wound was cleaned at bedside . Mri done and didn't show  septic arthropathy, or abscess. Vascular  studies with normal thomas and right great toe pressures adequate for healing . Infectious Disease recommended a combination of cipro flagyl and vancomycin pending cultures( prelim with mssa and Gram negative bacilli).  She underwent debridement by podiatry in the operating room on 9/21/18.   because of her diastolic dysfunction, she was cleared for the procedure beforehand by cardiology. She was transitioned to clindamycin prior to transfer.     Postoperatively, she is strict non weight bearing right lower extremity. Her CRP has decreased from 15.36 at admission to 5.90 on 9/22/18. She was transferred to [facility] on 9/26/18.    Assessment/Plan  Right foot infection S/p debridement by podiatry in OR on 9/21, discontinued abx on 10/2 due to rash, s/p bedside debridement with Dr. Casanova 10/9.  -WOC, cont with acetic acid irrigation to wound, s/p bedside I&D by plastic on 10/16. appt with podiatry on 10/18 with Dr. Kristina Rich to address wb status  -NWB right foot until seen by podiatr, ok for weight bearing for bathroom privileges only, off loading boot for commode transfers    Diastolic dysfunction  H/o Bradycardia  Echo in 11/2017 with grade 3 diastolic dysfunction, normal EF,  -outpt cardiology follow up, stress test as outpt  -continue torsemide 40 mg twice daily, bmp wnl  -Continue Lopressor and  Patient is a 55y Female who reports no complaints as new. no cp or sob.     MEDICATIONS  (STANDING):  amoxicillin  500 milliGRAM(s)/clavulanate 1 Tablet(s) Oral two times a day  ARIPiprazole 20 milliGRAM(s) Oral daily  aspirin 81 milliGRAM(s) Oral daily  atorvastatin 40 milliGRAM(s) Oral at bedtime  budesonide  80 MICROgram(s)/formoterol 4.5 MICROgram(s) Inhaler 2 Puff(s) Inhalation two times a day  calcium carbonate    500 mG (Tums) Chewable 1 Tablet(s) Chew two times a day  calcium carbonate   1250 mG (OsCal) 1 Tablet(s) Oral two times a day  cholestyramine Powder (Sugar-Free) 4 Gram(s) Oral daily  dextrose 50% Injectable 25 Gram(s) IV Push once  dextrose 50% Injectable 25 Gram(s) IV Push once  dicyclomine 20 milliGRAM(s) Oral two times a day before meals  heparin   Injectable 5000 Unit(s) SubCutaneous every 8 hours  insulin lispro (HumaLOG) corrective regimen sliding scale   SubCutaneous Before meals and at bedtime  levothyroxine 125 MICROGram(s) Oral daily  metoprolol tartrate 25 milliGRAM(s) Oral two times a day  pantoprazole    Tablet 40 milliGRAM(s) Oral daily    MEDICATIONS  (PRN):  acetaminophen   Tablet .. 650 milliGRAM(s) Oral every 6 hours PRN Temp greater or equal to 38C (100.4F), Mild Pain (1 - 3)  dextrose 40% Gel 15 Gram(s) Oral once PRN Blood Glucose LESS THAN 70 milliGRAM(s)/deciliter  glucagon  Injectable 1 milliGRAM(s) IntraMuscular once PRN Glucose LESS THAN 70 milligrams/deciliter  HYDROmorphone  Injectable 1.5 milliGRAM(s) IV Push every 4 hours PRN Severe Pain (7 - 10)  HYDROmorphone  Injectable 0.5 milliGRAM(s) IV Push every 2 hours PRN Moderate Pain (4 - 6)        T(C): , Max: 36.7 (10-23-20 @ 15:42)  T(F): , Max: 98 (10-23-20 @ 15:42)  HR: 90 (10-24-20 @ 07:50)  BP: 157/71 (10-24-20 @ 07:50)  BP(mean): --  RR: 18 (10-24-20 @ 07:50)  SpO2: 96% (10-24-20 @ 07:50)  Wt(kg): --    10-23 @ 07:01  -  10-24 @ 07:00  --------------------------------------------------------  IN: 808 mL / OUT: 420 mL / NET: 388 mL          PHYSICAL EXAM:    Constitutional: NAD, frail  HEENT: dry MM  Neck: No LAD, No JVD  Respiratory: dist BS  Cardiovascular: S1 and S2  Extremities: le amp, peripheral edema  Neurological: Asleep but arsouable  : No Black  Skin: No rashes  Access: Not applicable        LABS:                        8.9    8.66  )-----------( 114      ( 24 Oct 2020 07:39 )             28.2     23 Oct 2020 08:10    138    |  107    |  29     ----------------------------<  96     3.5     |  22     |  1.95   22 Oct 2020 11:54    135    |  105    |  30     ----------------------------<  194    3.2     |  20     |  1.94   22 Oct 2020 07:37    136    |  107    |  30     ----------------------------<  209    3.3     |  21     |  1.89   21 Oct 2020 08:04    132    |  105    |  32     ----------------------------<  104    3.6     |  18     |  1.99   20 Oct 2020 22:27    x      |  x      |  x      ----------------------------<  46     x       |  x      |  x        Ca    6.7        23 Oct 2020 08:10  Ca    6.9        22 Oct 2020 11:54  Ca    6.3        22 Oct 2020 07:37  Ca    6.1        21 Oct 2020 08:04  Phos  3.7       23 Oct 2020 08:10  Phos  3.5       22 Oct 2020 07:37  Mg     1.8       23 Oct 2020 08:10  Mg     1.4       22 Oct 2020 07:37    TPro  5.0    /  Alb  1.3    /  TBili  0.3    /  DBili  x      /  AST  30     /  ALT  21     /  AlkPhos  108    23 Oct 2020 08:10  TPro  4.6    /  Alb  1.1    /  TBili  0.3    /  DBili  x      /  AST  53     /  ALT  19     /  AlkPhos  132    22 Oct 2020 07:37          Urine Studies:          RADIOLOGY & ADDITIONAL STUDIES:               potassium    H/o tobacco abuse /  H/o chronic alcohol use  No alcohol and cigarettes since admission to [facility] on 9/14.  At risk for dependence.  -minimize narcotics as able  -nicotine patch prn     Possible COPD/CARMENZA  Has not had sleep study in the past. Does not use O2 at home.  -albuterol nebs prn  -cont diuretics to keep dry (see above)  -sleep study as outpatient     Essential hypertension  Acceptable control.   anticipation/anxiety/pain.  -continue current meds     Seizure disorder (H)  -cont keppra and lamictal  -seizure precautions     Borderline personality disorder  -cont abilify, effexor, Zyprexa prn      Hemiplegia (H)  Cerebral artery occlusion with cerebral infarction (H)  Secondary to ruptured aneurysm  -PT/OT  -antivert prn for dizziness    Extensive maculopapular Rash-improved.  Improving, likely due to clindamycin, s/p prednisone.    DM2.  Hgbaic 5.3 on 9/24, requiring minimal insulin sliding scale.  Accucheck as needed.        Code status:  Full Code - d/w patient at bedside. Father can make decisions if pt unable to do so       GI prophylaxis:pepcid  DVT prophylaxis:HSQ    Subjective:  Denies any new issues.      Objective:  Vital signs in last 24 hours:  Temp:  [97.5  F (36.4  C)-98.4  F (36.9  C)] 97.5  F (36.4  C)  Heart Rate:  [75-81] 75  Resp:  [20] 20  BP: (117-133)/(56-63) 121/58  Weight:   (!) 369 lb 1.6 oz (167.4 kg)    Intake/Output last 3 shifts:  I/O last 3 completed shifts:  In: 1866 [P.O.:1866]  Out: 700 [Urine:700]  Intake/Output this shift:  I/O this shift:  In: 1170 [P.O.:1170]  Out: -     Physical Exam:  General Appearance: NAD lying in bed  Throat: no thrush   Lungs: CTAB  Heart: Regular rate and rhythm, bilat LE with chronic lymphedema  Abdomen: Soft, nttp, ND  Extremities: Dressing on right lower ext  Skin: no new skin rash noted   Neuro: Alert and oriented x 3, nonfocal      Imaging and Lab Results - I have personally  reviewed the latest labs and imaging test results  Recent  Results (from the past 24 hour(s))   Basic Metabolic Panel   Result Value Ref Range    Sodium 139 136 - 145 mmol/L    Potassium 3.9 3.5 - 5.0 mmol/L    Chloride 98 98 - 107 mmol/L    CO2 29 22 - 31 mmol/L    Anion Gap, Calculation 12 5 - 18 mmol/L    Glucose 121 70 - 125 mg/dL    Calcium 9.9 8.5 - 10.5 mg/dL    BUN 17 8 - 22 mg/dL    Creatinine 0.68 0.60 - 1.10 mg/dL    GFR MDRD Af Amer >60 >60 mL/min/1.73m2    GFR MDRD Non Af Amer >60 >60 mL/min/1.73m2   Magnesium   Result Value Ref Range    Magnesium 2.0 1.8 - 2.6 mg/dL       D/w patient, and covering RN    Disposition/Follow up:  Medically ready for dc to TCU, when available.  Obesity will likely limit her options.    All medication issues identified within the last 24 hours have been addressed and completed as appropriate.

## 2021-06-21 NOTE — PROGRESS NOTES
[facility] Hospitalist Daily Progress Note    Summary  Marlo Kearns is a 37 y.o. 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 Wound was cleaned at bedside . Mri done and didn't show  septic arthropathy, or abscess. Vascular  studies with normal thomas and right great toe pressures adequate for healing . Infectious Disease recommended a combination of cipro flagyl and vancomycin pending cultures( prelim with mssa and Gram negative bacilli).  She underwent debridement by podiatry in the operating room on 9/21/18.   because of her diastolic dysfunction, she was cleared for the procedure beforehand by cardiology. She was transitioned to clindamycin prior to transfer.      Assessment/Plan  Right foot infection S/p debridement by podiatry in OR on 9/21, discontinued abx on 10/2 due to rash, s/p bedside debridement with Dr. Casanova 10/9.  -WOC, cont with acetic acid irrigation to wound, s/p bedside I&D by plastic on 10/16. appt with podiatry on 10/18 with Dr. Kristina Rich - start santyl to wound daily, they are applying with insurance for primatrix skin graft approval.  Follow up on 11/15 at 1pm, cont current nwb status  -NWB right foot, ok for weight bearing for bathroom privileges only, off loading boot for commode transfers    bilat shoulder pain  - lidoderm patch didn't work, start on diclofenac gel    Diastolic dysfunction  H/o Bradycardia  Echo in 11/2017 with grade 3 diastolic dysfunction, normal EF,  -outpt cardiology follow up, stress test as outpt  -continue torsemide 40 mg twice daily  -Continue Lopressor and  potassium    H/o tobacco abuse /  H/o chronic alcohol use  No alcohol and cigarettes since admission to [facility] on 9/14.  At risk for dependence.  -minimize narcotics as able  -nicotine patch prn     Possible COPD/CARMENZA  Has not had sleep study in the past. Does not use O2 at home.  -albuterol nebs prn  -cont diuretics to keep dry (see above)  -sleep study as outpatient     Essential hypertension  Acceptable control.   anticipation/anxiety/pain.  -continue current meds     Seizure disorder (H)  -cont keppra and lamictal  -seizure precautions     Borderline personality disorder  -cont abilify, effexor, Zyprexa prn      Hemiplegia (H)  Cerebral artery occlusion with cerebral infarction (H)  Secondary to ruptured aneurysm  -PT/OT  -antivert prn for dizziness    Extensive maculopapular Rash-resolved.  Improving, likely due to clindamycin, s/p prednisone.    DM2.  Hgbaic 5.3 on 9/24, requiring minimal insulin sliding scale.  Accucheck as needed.        Code status:  Full Code - d/w patient at bedside. Father can make decisions if pt unable to do so       GI prophylaxis:pepcid  DVT prophylaxis:HSQ    Subjective:  C/o of lidoderm patch not helping with bilat shoulder pain. Otherwise no new issues      Objective:  Vital signs in last 24 hours:  Temp:  [97.6  F (36.4  C)-98.1  F (36.7  C)] 97.6  F (36.4  C)  Heart Rate:  [72-85] 85  Resp:  [18-20] 18  BP: (109-136)/(55-69) 121/58  Weight:   (!) 370 lb 1.6 oz (167.9 kg)    Intake/Output last 3 shifts:  I/O last 3 completed shifts:  In: 1200 [P.O.:1200]  Out: -   Intake/Output this shift:       Physical Exam:  General Appearance: NAD in chair   Throat: no thrush noted  Lungs: CTAB  Heart: Regular rate and rhythm, bilat LE with chronic lymphedema, improved today   Abdomen: Soft, nttp, ND  Extremities: Dressing on right lower ext  Skin: no new skin rash noted on limited skin exam   Neuro: Alert and oriented x 3, nonfocal      Imaging and Lab Results - I have personally  reviewed  the latest labs and imaging test results  No results found for this or any previous visit (from the past 24 hour(s)).    D/w patient, and covering RN    Disposition/Follow up:  Medically ready for dc to TCU, when available.  Obesity will likely limit her options.    All medication issues identified within the last 24 hours have been addressed and completed as appropriate.

## 2021-06-21 NOTE — PROGRESS NOTES
"Plan:SW will continue to expand TCU placement search until placement is found. SW will continue to remain available to pt and family for emotional support, resources and discharge planning.    Subjective Data: SW met with pt 10/16/18 to discuss the progress of her placement at TCU. Pt was sitting at the edge of her bed, dressed in a hospital gown, alert and pleasantly engaged in conversation.    Objective Data: SW advised pt that all of pt's TCU choices have been exhausted, unfortunately pt has not been accepted for TCU placement due to her bariatric status and high care needs. Pt was disappointed however pt stated \"I can't go home from here.\" Pt is has realistic expectations and fully understands her high care needs at the present moment. Pt is agreeable to SW expanding the TCU search, pt did not offer further choices and advise SW to expand search as needed.      BARRIERS  1. Hypertensive stroke (3/20/15) with functional mobility deficits, decreased ADLs, decreased cognition and dysphagia.     2. TCU placement        Disposition: TCU   "

## 2021-06-21 NOTE — PROGRESS NOTES
"Clinical Nutrition Therapy Reassessment Note    Reason for Reassessment:   Per protocol/pathway.    Nutrition History:  Food allergies or intolerances: fish containing products & banana    Current Nutrition Prescription:   Diet: 2 gram Na+  Supplement/Modulars: Rusty BID    Current Nutrition Intake:  Good appetite consuming 100% of meals. Average intake daily the past week ~2120 kcal, 80 grams protein   Rusty provides: 80 kcal, 16.5 grams protein  Meeting estimated needs including all sources.    Anthropometrics:  Height: 5' 2\" (157.5 cm)  Weight: (!) 372 lb 4.8 oz (168.9 kg),10/17  BMI:68  BMI indication: >40 extreme obesity (class 3)  Ideal body weight:110 lb  Weight History:258 lb(4/10/15),360 lb(12/2017),396 lb(9/26/18)  Most recent wt's:386 lb(9/30), 388 lb(10/1),363 lb(10/8),367 lb(10/9)    GI Status/Output:   GI symptoms include:  WDL noted per nursing    Skin/Wound:  Chidi score Chidi Scale Score: 18  Right plantar foot surgical wound & right arm puncture wound from an IV infiltrate. WOC note reviewed 10/18 & wounds improving.    Medications:  Note:Torsemide, KCL, Culturelle  Folic Acid, MVI w/minerals    Labs:  Reviewed.    Estimated Nutrition Needs:  Assessment weight is 82 kg, adjusted weight    Energy Needs: 2162-8861 kcals daily, 20-22 kcal/kg  Protein Needs: 100-125 g daily, 1.2-1.5 g/kg.  Fluid Needs: 2050 mls daily or per doctor, 25 mls/kg    Nutrition Risk Level: moderate-stable risk    Nutrition dx:   Obesity grade 3 r/t lifestyle as evidenced by BMI >41.  Increased protein needs r/t wound healing as evidenced by right plantar surgical foot wound.    Goals:   No wt gain-progressing  Wound healing-progressing    Intervention:  Continue Rusty BID for wound healing    Monitoring/Evaluation:   Per protocol      Electronically signed by:  Jimena Patrick RD  "

## 2021-06-21 NOTE — PROGRESS NOTES
[facility] Hospitalist Daily Progress Note    Summary  Marlo Kearns is a 37 y.o. 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 Wound was cleaned at bedside . Mri done and didn't show  septic arthropathy, or abscess. Vascular  studies with normal thomas and right great toe pressures adequate for healing . Infectious Disease recommended a combination of cipro flagyl and vancomycin pending cultures( prelim with mssa and Gram negative bacilli).  She underwent debridement by podiatry in the operating room on 9/21/18.   because of her diastolic dysfunction, she was cleared for the procedure beforehand by cardiology. She was transitioned to clindamycin prior to transfer.     Postoperatively, she is strict non weight bearing right lower extremity. Her CRP has decreased from 15.36 at admission to 5.90 on 9/22/18. She was transferred to [facility] on 9/26/18.    Assessment/Plan  Right foot infection S/p debridement by podiatry in OR on 9/21, discontinued abx on 10/2 due to rash, s/p bedside debridement with Dr. Casanova 10/9.  -WOC, cont with acetic acid irrigation to wound. keep appt with podiatry on 10/18 given with change in wound status  -NWB right foot until seen by podiatr, ok for weight bearing for bathroom privileges only, off loading boot for commode transfers  -has appt with vascular center/podiatry on 10/18 with Dr. Kristina Rich    Diastolic dysfunction  Bradycardia  Echo in 11/2017 with grade 3 diastolic dysfunction, normal EF,  -outpt cardiology follow up, stress test as outpt  -continue torsemide 40 mg twice  daily  -Continue Lopressor and potassium    H/o tobacco abuse /  H/o chronic alcohol use  No alcohol and cigarettes since admission to [facility] on 9/14.  At risk for dependence.  -minimize narcotics as able  -nicotine patch prn     Possible COPD/CARMENZA  Has not had sleep study in the past. Does not use O2 at home.  -albuterol nebs prn  -cont diuretics to keep dry (see above)  -sleep study as outpatient     Essential hypertension  Acceptable control.   anticipation/anxiety/pain.  -continue current meds     Seizure disorder (H)  -cont keppra and lamictal  -seizure precautions     Borderline personality disorder  -cont abilify, effexor, Zyprexa prn      Hemiplegia (H)  Cerebral artery occlusion with cerebral infarction (H)  Secondary to ruptured aneurysm  -PT/OT  -antivert prn for dizziness    Extensive maculopapular Rash-improved.  Improving, likely due to clindamycin, s/p prednisone.    DM2.  Hgbaic 5.3 on 9/24, requiring minimal insulin sliding scale.  Accucheck as needed.        Code status:  Full Code - d/w patient at bedside. Father can make decisions if pt unable to do so       GI prophylaxis:pepcid  DVT prophylaxis:HSQ    Subjective:  Denies any new issues      Objective:  Vital signs in last 24 hours:  Temp:  [98.2  F (36.8  C)-98.7  F (37.1  C)] 98.2  F (36.8  C)  Heart Rate:  [72-86] 84  Resp:  [20] 20  BP: (105-150)/(53-82) 150/74  Weight:   (!) 360 lb 14.4 oz (163.7 kg)    Intake/Output last 3 shifts:  I/O last 3 completed shifts:  In: 1200 [P.O.:1200]  Out: -   Intake/Output this shift:  I/O this shift:  In: 240 [P.O.:240]  Out: -     Physical Exam:  General Appearance: NAD lying in bed  Throat: Moist oral mucosa no thrush   Lungs: CTAB  Heart: Regular rate and rhythm, bilat LE with chronic lymphedema  Abdomen: Soft, nttp, ND  Extremities: Dressing on right lower ext, there is a wound on the right plantar area.  Area is with some edema and erythema, but not significant today on exam.  No drainage or purulence.     Skin: no new skin rash noted on limited exam   Neuro: Alert and oriented x 3, nonfocal      Imaging and Lab Results - I have personally  reviewed the latest labs and imaging test results  Recent Results (from the past 24 hour(s))   C-Reactive Protein   Result Value Ref Range    CRP 5.3 (H) 0.0 - 0.8 mg/dL   HM1 (CBC with Diff)   Result Value Ref Range    WBC 9.4 4.0 - 11.0 thou/uL    RBC 4.33 3.80 - 5.40 mill/uL    Hemoglobin 12.4 12.0 - 16.0 g/dL    Hematocrit 37.9 35.0 - 47.0 %    MCV 88 80 - 100 fL    MCH 28.6 27.0 - 34.0 pg    MCHC 32.7 32.0 - 36.0 g/dL    RDW 14.0 11.0 - 14.5 %    Platelets 168 140 - 440 thou/uL    MPV 10.1 8.5 - 12.5 fL    Neutrophils % 67 50 - 70 %    Lymphocytes % 23 20 - 40 %    Monocytes % 6 2 - 10 %    Eosinophils % 4 0 - 6 %    Basophils % 0 0 - 2 %    Neutrophils Absolute 6.3 2.0 - 7.7 thou/uL    Lymphocytes Absolute 2.1 0.8 - 4.4 thou/uL    Monocytes Absolute 0.6 0.0 - 0.9 thou/uL    Eosinophils Absolute 0.3 0.0 - 0.4 thou/uL    Basophils Absolute 0.0 0.0 - 0.2 thou/uL       D/w patient, and covering RN    Disposition/Follow up:  Medically ready for dc to TCU, when available.  Obesity will likely limit her options.    All medication issues identified within the last 24 hours have been addressed and completed as appropriate.

## 2021-06-21 NOTE — PROGRESS NOTES
[facility] Hospitalist Daily Progress Note    Summary  Marlo Kearns is a 37 y.o. 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 Wound was cleaned at bedside . Mri done and didn't show  septic arthropathy, or abscess. Vascular  studies with normal thomas and right great toe pressures adequate for healing . Infectious Disease recommended a combination of cipro flagyl and vancomycin pending cultures( prelim with mssa and Gram negative bacilli).  She underwent debridement by podiatry in the operating room on 9/21/18.   because of her diastolic dysfunction, she was cleared for the procedure beforehand by cardiology. She was transitioned to clindamycin prior to transfer.     Postoperatively, she is strict non weight bearing right lower extremity. Her CRP has decreased from 15.36 at admission to 5.90 on 9/22/18. She was transferred to [facility] on 9/26/18.    Assessment/Plan  Right foot infection S/p debridement by podiatry in OR on 9/21, discontinued abx on 10/2 due to rash, s/p bedside debridement with Dr. Casanova 10/9.  -WOC, wet to moist dressings recommended  -NWB right foot until seen by podiatry as outpt, ok for weight bearing for bathroom privileges only, off loading boot for commode transfers  -has appt with vascular center/podiatry on 10/18 with Dr. Kristina Rich  -continue hydrocortisone cream for pruritic rash    Diastolic dysfunction  Bradycardia  Echo in 11/2017 with grade 3 diastolic dysfunction, normal EF, wt 367lb (down 13 lbs since admission).  BMP remains stable.  -outpt cardiology follow up, stress test as  outpt  -Daily weights, I and O  -continue torsemide 40 mg twice daily  -Continue Lopressor and potassium    H/o tobacco abuse /  H/o chronic alcohol use  No alcohol and cigarettes since admission to [facility] on 9/14.  At risk for dependence.  -minimize narcotics as able  -nicotine patch prn     Possible COPD/CARMENZA  Has not had sleep study in the past. Does not use O2 at home.  -albuterol nebs prn  -cont diuretics to keep dry (see above)  -sleep study as outpatient     Essential hypertension  Acceptable control.   anticipation/anxiety/pain.  -continue current meds     Seizure disorder (H)  -cont keppra and lamictal  -seizure precautions     Borderline personality disorder  -cont abilify, effexor, Zyprexa prn      Hemiplegia (H)  Cerebral artery occlusion with cerebral infarction (H)  Secondary to ruptured aneurysm  -PT/OT  -antivert prn for dizziness    Extensive maculopapular Rash-improved.  Remains itchy, likely due to clindamycin, s/p prednisone.  -continue scheduled benadryl and hydrocortisone cream    Left arm cellulitis, improved.    DM2.  Hgbaic 5.3 on 9/24, requiring minimal insulin sliding scale.  Accucheck as needed.        Code status:  Full Code - d/w patient at bedside. Father can make decisions if pt unable to do so       GI prophylaxis:pepcid  DVT prophylaxis:HSQ    Subjective:  No overnight events, states she slept well and appetite is good.  Awaiting transfer to TCU when available.    Objective:  Vital signs in last 24 hours:  Temp:  [97.6  F (36.4  C)-98.6  F (37  C)] 97.6  F (36.4  C)  Heart Rate:  [76-88] 88  Resp:  [18-20] 18  BP: (119-144)/(61-89) 144/89  Weight:   (!) 368 lb 4.8 oz (167.1 kg)    Intake/Output last 3 shifts:  I/O last 3 completed shifts:  In: 1710 [P.O.:1710]  Out: 500 [Urine:500]  Intake/Output this shift:  I/O this shift:  In: 500 [P.O.:500]  Out: 300 [Urine:300]    Physical Exam:  General Appearance: NAD, sitting up in chair, just got off commode.  Throat: Moist oral  mucosa  Lungs: CTAB  Heart: Regular rate and rhythm, bilat LE with chronic lymphedema  Abdomen: Soft, non-tender to palpation, nd  Extremities: Dressing on right lower ext CDI  Skin: Excoriated, dry, erythematous patches on chest and arms evidence of recent scratching mildly improved.  Neuro: Alert and oriented x 3, nonfocal      Imaging and Lab Results - I have personally  reviewed the latest labs and imaging test results  No results found for this or any previous visit (from the past 24 hour(s)).    D/w patient, and covering RN    Disposition/Follow up:  Medically ready for dc to TCU, when available.  Obesity will likely limit her options.    All medication issues identified within the last 24 hours have been addressed and completed as appropriate.    Idania Brothers MD  Internal Medicine

## 2021-06-21 NOTE — PROGRESS NOTES
[facility] Hospitalist Daily Progress Note    Summary  Marlo Kearns is a 37 y.o. 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 on 9/14/18 with right foot wound infection.She was started on vancomycin due to allergy to cephalexin, podiatry was consulted. Wound was cleaned at bedside . Mri done and didn't show  septic arthropathy, or abscess. Vascular  studies with normal thomas and right great toe pressures adequate for healing . Infectious Disease recommended a combination of cipro flagyl and vancomycin pending cultures( prelim with mssa and Gram negative bacilli).  She underwent debridement by podiatry in the operating room on 9/21/18.   because of her diastolic dysfunction, she was cleared for the procedure beforehand by cardiology. She was transitioned to clindamycin prior to transfer.      Assessment/Plan  Right foot infection   S/p debridement by podiatry in OR on 9/21, discontinued abx on 10/2 due to rash, s/p bedside debridement with Dr. Casanova  -IGNACIO, cont with acetic acid irrigation to wound, s/p bedside I&D by plastic   - appt with podiatry on 10/18 with Dr. Kristina Rich - started santyl to wound daily, they are applying with insurance for primatrix skin graft approval.  Follow up on 11/15 at 1pm, cont current nwb status  -NWB right foot, ok for weight bearing for bathroom privileges only, off loading boot for commode transfers  -collagenase daily  -pain meds - tylenol prn, percocet prn    bilat shoulder pain  - lidoderm patch didn't work, started on diclofenac gel-which seemed to help  -cont therapy    Diastolic dysfunction  H/o Bradycardia  Echo in 11/2017 with grade 3 diastolic dysfunction, normal EF,  -outpt cardiology follow up,  stress test as outpt  -continue torsemide 40 mg twice daily, bmp stable - check intermittently. Cont potassium  -Continue Lopressor    H/o tobacco abuse /  H/o chronic alcohol use  No alcohol and cigarettes since admission to [facility] on 9/14.  At risk for dependence.  -minimize narcotics as able  -nicotine patch prn     Possible COPD/CARMENZA  Has not had sleep study in the past. Does not use O2 at home.  -albuterol nebs prn, duonebs prn  -cont diuretics to keep dry (see above)  -sleep study as outpatient     Essential hypertension  Acceptable control.   anticipation/anxiety/pain.  -continue current meds - norvasc, lopressor     Seizure disorder (H)  -cont keppra and lamictal  -seizure precautions     Borderline personality disorder  -cont abilify, effexor, Zyprexa prn        Other issues  Hemiplegia (H)  Cerebral artery occlusion with cerebral infarction (H)  Secondary to ruptured aneurysm  -PT/OT  -antivert prn for dizziness    Extensive maculopapular Rash-resolved.  Improving, likely due to clindamycin, s/p prednisone.- benadryl prn, benadryl -zn prn, hydrocortisone cream prn    DM2.  Hgbaic 5.3 on 9/24, requiring minimal insulin sliding scale.  Accucheck as needed.        Code status:  Full Code - d/w patient at bedside. Father can make decisions if pt unable to do so       GI prophylaxis:pepcid, tums prn, zofran prn  DVT prophylaxis:HSQ  Other meds - narcan, micotin, hypoglycemia protocol, laila, aquaphor, folvite, MVT  Bowel meds - dulcolax prn    Subjective:  Doing ok, no complaints      Objective:  Vital signs in last 24 hours:  Temp:  [98.1  F (36.7  C)-98.3  F (36.8  C)] 98.3  F (36.8  C)  Heart Rate:  [72-88] 75  Resp:  [18-21] 21  BP: (107-139)/(58-69) 139/65  Weight:   (!) 376 lb (170.6 kg)    Intake/Output last 3 shifts:  I/O last 3 completed shifts:  In: 1880 [P.O.:1830; Other:50]  Out: -   Intake/Output this shift:  I/O this shift:  In: 1050 [P.O.:1050]  Out: -     Physical Exam:  General Appearance: NAD  in chair   Throat: no thrush   Lungs: CTAB  Heart: Regular rate and rhythm, bilat LE with chronic lymphedema  Abdomen: Soft, nttp, ND  Extremities: Dressing on right lower ext  Skin:dry skin on arms, no other new rashes  Neuro: Alert and oriented x 3, nonfocal      Imaging and Lab Results - I have personally  reviewed the latest labs and imaging test results      D/w patient, and covering RN    Disposition/Follow up:  Medically ready for dc to TCU, when available.        All medication issues identified within the last 24 hours have been addressed and completed as appropriate.    Aura Preston MD, MPH  [facility]

## 2021-06-21 NOTE — PROGRESS NOTES
Clinical Social Work - Discharge Note    Plan:       Discharge Date: 10/25/18     Disposition (include location/agency name, phone #, fax # and additional info):      [x]  Transitional Care Unit:  [facility] ([phone] Fax: [phone]) at 1300        Additional Discharge Information / Recommendation: PAS Completed, PCS Stretcher Form Completed       [x]Primary Community MD / Clinic (include phone #): Dr. Kia Villalpando [phone]  Subjective Data:    SW has been meeting with pt and family over the past several weeks to coordinate discharge from . Pt and family have been pleasantly engaged in discharge planning and demonstrated good insight to pt. s post-acute care needs.  Pt will discharge today toTCU, pt. and family are excited and eager to discharge from [facility] to a lower level of cares.     Objective Data:  Primary Decision Maker: Patient    Per MD and  Care Teams pt is medically stable to discharge to a lower-level of care for ongoing rehabilitation and cares, including wound cares. AZEB has been meeting with pt. and family for the past several weeks in order to prepare pt for discharge from . AZEB provided pt. and family a Medicare Certified TCU/SNF list for review. After reviewing list, pt offered several choices for referrals. Unfortunately pt was not accepted at their preferred TCU choice. AZEB expanded the TCU search to 26 Facilities, pt was ultimately accepted by [facility]. AZEB discussed transportation with pt and family; pt is unable to safely travel in a personal vehicle. SW discussed possible out-of-pocket cost for transportation, pt verbalized understanding and agreement with information. AZEB placed referral to [employer]; pt will discharge to [facility] ([phone] Fax: [phone]) at 1300 via [employer] Stretcher (due to weight limit) Transportation.    Funding Information: Medicaid      Resources Provided:   []  Honoring Choices   []  Guardianship Info   []  MA  Info & Mike   []  Waiver Programs   []  SSI/SSDI   []  B[employer] Injury Association   [x]  TCU/SNF List   []  Assisted Living List   []  Group Home List   []  Community Services List   []  Legal Assistance   []  Driving Evaluation Info   []   Services   []  Home Care   []  County Contact Info   [x]  Other:     Maryellen Blunt LGSW

## 2021-06-21 NOTE — PROGRESS NOTES
[facility] Hospitalist Daily Progress Note    Summary  Marlo Kearns is a 37 y.o. 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 Wound was cleaned at bedside . Mri done and didn't show  septic arthropathy, or abscess. Vascular  studies with normal thomas and right great toe pressures adequate for healing . Infectious Disease recommended a combination of cipro flagyl and vancomycin pending cultures( prelim with mssa and Gram negative bacilli).  She underwent debridement by podiatry in the operating room on 9/21/18.   because of her diastolic dysfunction, she was cleared for the procedure beforehand by cardiology. She was transitioned to clindamycin prior to transfer.      Assessment/Plan  Right foot infection   S/p debridement by podiatry in OR on 9/21, discontinued abx on 10/2 due to rash, s/p bedside debridement with Dr. Casanova  -IGNACIO, cont with acetic acid irrigation to wound, s/p bedside I&D by plastic   - appt with podiatry on 10/18 with Dr. Kristina Rich - started santyl to wound daily, they are applying with insurance for primatrix skin graft approval.  Follow up on 11/15 at 1pm, cont current nwb status  -NWB right foot, ok for weight bearing for bathroom privileges only, off loading boot for commode transfers  -collagenase daily  -pain meds - tylenol prn, percocet prn    bilat shoulder pain  - lidoderm patch didn't work, started on diclofenac gel-which seemed to help  -cont therapy    Diastolic dysfunction  H/o Bradycardia  Echo in 11/2017 with grade 3 diastolic dysfunction, normal EF,  -outpt cardiology follow up,  stress test as outpt  -continue torsemide 40 mg twice daily, bmp stable - check intermittently. Cont potassium  -Continue Lopressor    H/o tobacco abuse /  H/o chronic alcohol use  No alcohol and cigarettes since admission to [place] on 9/14.  At risk for dependence.  -minimize narcotics as able  -nicotine patch prn     Possible COPD/CARMENZA  Has not had sleep study in the past. Does not use O2 at home.  -albuterol nebs prn, duonebs prn  -cont diuretics to keep dry (see above)  -sleep study as outpatient     Essential hypertension  Acceptable control.   anticipation/anxiety/pain.  -continue current meds - norvasc, lopressor     Seizure disorder (H)  -cont keppra and lamictal  -seizure precautions     Borderline personality disorder  -cont abilify, effexor, Zyprexa prn        Other issues  Hemiplegia (H)  Cerebral artery occlusion with cerebral infarction (H)  Secondary to ruptured aneurysm  -PT/OT  -antivert prn for dizziness    Extensive maculopapular Rash-resolved.  Improving, likely due to clindamycin, s/p prednisone.- benadryl prn, benadryl -zn prn, hydrocortisone cream prn    DM2.  Hgbaic 5.3 on 9/24, requiring minimal insulin sliding scale.  Accucheck as needed.        Code status:  Full Code - d/w patient at bedside. Father can make decisions if pt unable to do so       GI prophylaxis:pepcid, tums prn, zofran prn  DVT prophylaxis:HSQ  Other meds - narcan, micotin, hypoglycemia protocol, laila, aquaphor, folvite, MVT  Bowel meds - dulcolax prn    Subjective:  Doing ok, no complaints      Objective:  Vital signs in last 24 hours:  Temp:  [97.7  F (36.5  C)-98.1  F (36.7  C)] 98  F (36.7  C)  Heart Rate:  [69-84] 79  Resp:  [20] 20  BP: ()/(49-83) 134/83  Weight:   (!) 376 lb (170.6 kg)    Intake/Output last 3 shifts:  I/O last 3 completed shifts:  In: 2210 [P.O.:2210]  Out: -   Intake/Output this shift:  I/O this shift:  In: 200 [P.O.:200]  Out: -     Physical Exam:  General Appearance: NAD in chair   Throat:  no thrush   Lungs: CTAB  Heart: Regular rate and rhythm, bilat LE with chronic lymphedema  Abdomen: Soft, nttp, ND  Extremities: Dressing on right lower ext  Skin:dry skin on arms, no other new rashes  Neuro: Alert and oriented x 3, nonfocal      Imaging and Lab Results - I have personally  reviewed the latest labs and imaging test results      D/w patient, and covering RN    Disposition/Follow up:  Medically ready for dc to TCU, when available.        All medication issues identified within the last 24 hours have been addressed and completed as appropriate.    Aura Preston MD, MPH  [facility]

## 2021-06-21 NOTE — DISCHARGE SUMMARY
HOSPITAL DISCHARGE SUMMARY    Patient Name: Marlo Kearns    YOB: 1980 Age: 37 y.o.    Medical Record Number: [id number]    Primary Physician: Kia Villalpando PA-C    Phone: [phone]    Admission Date: 9/26/2018    Discharge Date: 10/25/2018     The patient will be discharged from [facility] to TCU. I saw the patient on the day of discharge.    PRINCIPAL DISCHARGE DIAGNOSIS:   Right foot infection  Diastolic dysfunction  H/o bradycarda  H/o tobacco use, h/o chronic alcohol use  Possible COPD/CARMENZA  Essential HTN  Seizure disorder  Borderline personality disorder        BRIEF HOSPITAL COURSE:     Prior hospital course:   Marlo Kearns is a 37 y.o. 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 Wound was cleaned at bedside . Mri done and didn't show  septic arthropathy, or abscess. Vascular  studies with normal thomas and right great toe pressures adequate for healing . Infectious Disease recommended a combination of cipro flagyl and vancomycin pending cultures( prelim with mssa and Gram negative bacilli).  She underwent debridement by podiatry in the operating room on 9/21/18.   because of her diastolic dysfunction, she was cleared for the procedure beforehand by cardiology. She was transitioned to clindamycin prior to transfer.     Postoperatively, she was strict non weight bearing right lower extremity. Her CRP has decreased from 15.36 at admission to 5.90 on 9/22/18. She was transferred to [place] on 9/26/18.    [place] [facility]  During her hospitalization at [place] she  did well. She did develop a rash, possibly due to clindamycin which was stopped and added to her medication list and she recd few doses of prednisone with improvement. She was seen by ID and wound care. Her antibiotics were transitioned to vancomycin to complete a course. Her course is as follows    Right foot infection   S/p debridement by podiatry in OR on 9/21, discontinued abx on 10/2 due to rash, s/p bedside debridement with Dr. Casanova  -[facility], cont with acetic acid irrigation to wound,   - appt with podiatry on 10/18 with Dr. Kristina Rich - started santyl to wound daily, they are applying with insurance for primatrix skin graft approval.  Follow up on 11/15 at 1pm, cont current nwb status  -NWB right foot, ok for weight bearing for bathroom privileges only, off loading boot for commode transfers  -collagenase daily  -pain meds - tylenol prn, percocet prn     bilat shoulder pain  -cont therapy     Diastolic dysfunction  H/o Bradycardia  Echo in 11/2017 with grade 3 diastolic dysfunction, normal EF,  -outpt cardiology follow up, stress test as outpt  -continue torsemide 40 mg twice daily, bmp stable - check intermittently. Cont potassium  -Continue Lopressor     H/o tobacco abuse /  H/o chronic alcohol use  No alcohol and cigarettes since admission to [place] on 9/14.  At risk for dependence.  -minimize narcotics as able - cont to taper narcotics  -nicotine patch prn  -cont FA, MVT      Possible COPD/CARMENZA  Has not had sleep study in the past. Does not use O2 at home.  -albuterol nebs prn,  -cont diuretics to keep dry (see above)  -sleep study as outpatient      Essential hypertension  Acceptable control.   anticipation/anxiety/pain.  -continue current meds - norvasc, lopressor      Seizure disorder (H)  -cont keppra and lamictal  -seizure precautions      Borderline personality disorder  -cont abilify, effexor, Zyprexa prn          Other issues  Hemiplegia (H)  Cerebral artery occlusion with cerebral infarction  (H)  Secondary to ruptured aneurysm  -PT/OT  -antivert prn for dizziness     Extensive maculopapular Rash-resolved.  Improving, likely due to clindamycin, s/p prednisone     DM2.  Hgbaic 5.3 on 9/24, requiring minimal insulin sliding scale.  Accucheck as needed.          Code status:  Full Code - d/w patient at bedside. Father can make decisions if pt unable to do so         GI prophylaxis:pepcid,   DVT prophylaxis;lovenox  Other meds -  micotin, laila, aquaphor,   Bowel meds - dulcolax prn           PROCEDURES PERFORMED DURING HOSPITALIZATION:   Labs  CXR      COMPLICATIONS IN HOSPITAL:   complications were noted as: rash    PERTINENT FINDINGS/RESULTS AT DISCHARGE:     Vitals:    10/25/18 0806   BP: 110/53   Pulse: 75   Resp: 19   Temp: 98  F (36.7  C)   SpO2: 98%       Physical Exam:  General Appearance: NAD in chair   Throat: no thrush   Lungs: CTAB  Heart: Regular rate and rhythm, bilat LE with chronic lymphedema  Abdomen: Soft, nttp, ND  Extremities: Dressing on right lower ext  Skin:dry skin on arms, no other new rashes  Neuro: Alert and oriented x 3, nonfocal    Imaging  CXR 9/27/18  FINDINGS: Heart and mediastinal size are within normal limits given AP portable technique. There is indistinctness of the pulmonary interstitium, compatible with edema. No lobar infiltrate. No obvious pleural effusion or pneumothorax.    Labs    Results from last 7 days  Lab Units 10/23/18  1046   LN-WHITE BLOOD CELL COUNT thou/uL 7.1   LN-HEMOGLOBIN g/dL 11.9*   LN-HEMATOCRIT % 36.9   LN-PLATELET COUNT thou/uL 197       Results from last 7 days  Lab Units 10/23/18  1046   LN-SODIUM mmol/L 138   LN-POTASSIUM mmol/L 3.8   LN-CHLORIDE mmol/L 99   LN-CO2 mmol/L 28   LN-BLOOD UREA NITROGEN mg/dL 21   LN-CREATININE mg/dL 0.74   LN-CALCIUM mg/dL 9.6         CONDITION AT DISCHARGE: Stable    DISCHARGE ORDERS       Medication List      START taking these medications          albuterol 2.5 mg /3 mL (0.083 %) nebulizer solution   Commonly  known as:  PROVENTIL   Take 3 mL (2.5 mg total) by nebulization every 4 (four) hours as needed for wheezing.       bisacodyl 10 mg suppository   Commonly known as:  DULCOLAX   Daily as needed for constipation       enoxaparin 40 mg/0.4 mL syringe   Commonly known as:  LOVENOX   Inject 0.4 mL (40 mg total) under the skin every 12 (twelve) hours.       Rusty Therapuetic Nutrition 7-7-1.5 gram Pwpk   Commonly known as:  RUSTY   Take 1 packet by mouth 2 (two) times a day.       * miconazole 2 % powder   Commonly known as:  MICOTIN   For moisture or rash:  Apply to folds and/or inner thigh region.       * miconazole nitrate 2 % Oint ointment   Commonly known as:  CRITIC-AID AF   Apply to perianal rash, anterior thigh       multivitamin with minerals 9 mg iron-400 mcg Tab tablet   Commonly known as:  THERA-M   Take 1 tablet by mouth daily.       nicotine 14 mg/24 hr   Commonly known as:  NICODERM CQ   Place 1 patch on the skin daily as needed.       white petrolatum 41 % Oint   Commonly known as:  AQUAPHOR NATURAL HEALING   To dry skin       * Notice:  This list has 2 medication(s) that are the same as other medications prescribed for you. Read the directions carefully, and ask your doctor or other care provider to review them with you.      CHANGE how you take these medications          amLODIPine 10 MG tablet   Commonly known as:  NORVASC   Take 1 tablet (10 mg total) by mouth every morning. Hold for SBP < 110   What changed:    - when to take this  - additional instructions       folic acid 1 MG tablet   Commonly known as:  FOLVITE   Take 1 tablet (1 mg total) by mouth daily.   What changed:  when to take this       metoprolol tartrate 25 MG tablet   Commonly known as:  LOPRESSOR   Take 0.5 tablets (12.5 mg total) by mouth 2 (two) times a day. Hold for SBP < 100, HR < 60   What changed:    - medication strength  - how much to take  - additional instructions       oxyCODONE-acetaminophen 5-325 mg per tablet   Commonly  known as:  PERCOCET/ENDOCET   Take 1-2 tablets by mouth every 6 (six) hours as needed (use 1 tab for pain 1 - 5 and 2 tab for pain 6- 10).   What changed:    - when to take this  - reasons to take this         CONTINUE taking these medications          acetaminophen 500 MG tablet   Commonly known as:  TYLENOL       ARIPiprazole 5 MG tablet   Commonly known as:  ABILIFY   Take 1 tablet (5 mg total) by mouth daily.       collagenase ointment       famotidine 20 MG tablet   Commonly known as:  PEPCID       lamoTRIgine 150 MG tablet   Commonly known as:  LaMICtal       levETIRAcetam 500 MG tablet   Commonly known as:  KEPPRA       meclizine 25 mg tablet   Commonly known as:  ANTIVERT       OLANZapine 2.5 MG tablet   Commonly known as:  zyPREXA   TAKE 1 TABLET BY MOUTH THREE TIMES A DAY AS NEEDED FOR AGITATION       potassium chloride 20 MEQ tablet   Commonly known as:  K-DUR,KLOR-CON       torsemide 20 MG tablet   Commonly known as:  DEMADEX       venlafaxine 37.5 MG 24 hr capsule   Commonly known as:  EFFEXOR-XR   Take 1 capsule (37.5 mg total) by mouth daily.         STOP taking these medications          clindamycin 150 MG capsule   Commonly known as:  CLEOCIN       Lactobacillus rhamnosus GG 15 billion cell Cpsp   Commonly known as:  CULTURELLE            Where to Get Your Medications      You can get these medications from any pharmacy     Bring a paper prescription for each of these medications      oxyCODONE-acetaminophen 5-325 mg per tablet         Information about where to get these medications is not yet available     ! Ask your nurse or doctor about these medications      albuterol 2.5 mg /3 mL (0.083 %) nebulizer solution     amLODIPine 10 MG tablet     bisacodyl 10 mg suppository     enoxaparin 40 mg/0.4 mL syringe     folic acid 1 MG tablet     Rusty Therapuetic Nutrition 7-7-1.5 gram Pwpk     metoprolol tartrate 25 MG tablet     miconazole 2 % powder     miconazole nitrate 2 % Oint ointment      multivitamin with minerals 9 mg iron-400 mcg Tab tablet     nicotine 14 mg/24 hr     white petrolatum 41 % Oint             FOLLOW UP APPOINTMENTS:   1) Appt with Dr. Kristina Rich on Thursday Nov. 15th at 1:00. [facility], [address] (p. [phone], f. [phone]).  2) Follow up with BLANE Fuentes, within 5 days of discharge from [place].   3) Follow up with Antwan Chairez regarding CHF after discharge from [place].      FOLLOW UP LABS/IMAGING/Recommendations:   1) NWB right foot, ok for weight bearing for bathroom privileges only, off loading boot for commode transfers  2) Follow up for stress test as outpt  3) Follow up with PMD for arranging sleep study as outpt  4) Encourage smoking cessation  5) Seizure precautions  6) Cont to taper pain meds as able  7) cbc, bmp, mag in 2 - 3 days with resuts to PMD/NH MD      WOUND CARE:   as directed    LINE CARE:   None    DIET:   2 gm Na diet    ACTIVITY:   as per therapy   Non weight bearing right foot, ok for weight bearing for bathroom priviliges only, odd loading boot for commode transfers    CODE STATUS:   full code      Please feel free to contact me if you have any questions.    Total time spent for discharge on date of discharge: 40 minutes    Aura Preston     Physician(s) in addition to primary physician who should receive a copy:    COMMENT:

## 2021-06-21 NOTE — PROGRESS NOTES
Plastic Surgery Assessment Note    Today's Visit:  Pt is doing well and has no concerns or complaints.  Pt is tolerating the dressings well.  Pt has no fever or chills.      Labs:  No results for input(s): CRP, HGBA1C, LABPRE, ALBUMIN in the last 72 hours.    Invalid input(s): CDIFF, HEM    Vitals:  Vitals:    10/23/18 0918   BP: 139/65   Pulse: 75   Resp:    Temp:    SpO2:        Chidi:  Chidi Scale Score: 18  Specialty Bed: [facility] 10A/arise 1000  (!) 376 lb (170.6 kg)  Body mass index is 68.77 kg/(m^2).    Output:  Urine Occurrence: 1   Urine: 1 mL  Urinary Incontinence:  (No pt used commode)  Stool Occurrence: 1          Physical Exam:  General Appearance:  Appears comfortable, Alert, cooperative, no distress,   Head: Normocephalic, without obvious abnormality, atraumatic  Eyes: PERRL, conjunctiva/corneas clear, EOM's intact, both eyes   Nose: Nares normal, no drainage   Throat: Lips, mucosa, and tongue normal; teeth and gums normal  Neck: Supple, symmetrical, trachea midline, no adenopathy;    Lungs: Clear to auscultation bilaterally, respirations unlabored  Chest Wall: No tenderness or deformity  Heart: Regular rate and rhythm, S1 and S2 normal, no murmur, rubs or gallop  Abdomen: Soft, non-tender, bowel sounds active all four quadrants,   no masses, no organomegaly  Extremities: Extremities normal, atraumatic, no cyanosis or edema  Pulses: DP pulses are 1-2+ bilat.    Skin: no rashes or lesions  Neurologic: normal and equal strength bilat in upper and lower extremities    Wound Ostomy Assessment/Plan:  Wound 09/26/18 Foot Right;Posterior (back) (Active)   Site Assessment Covered, not assessed 10/23/2018  2:36 AM   Surrounding Tissue Assessment Pink;Edema 10/22/2018  1:00 PM   Drainage Amount Moderate 10/22/2018  1:00 PM   Drainage Description Serosanguineous;Tan 10/22/2018  1:00 PM   Site Care Cleansed;Cream / Ointment 10/22/2018  1:00 PM   Dressing Dry gauze 10/22/2018  1:00 PM   Dressing Status  Reinforced  10/23/2018  2:50 AM       Wound 09/26/18 Laceration Arm Right;Anterior (front) (Active)   Site Assessment Covered, not assessed 10/23/2018  2:36 AM   Surrounding Tissue Assessment Covered, not assessed 10/22/2018  5:16 PM   Closure Covered, not assessed 10/20/2018  6:04 PM   Drainage Amount Scant 10/15/2018 11:26 AM   Drainage Description Serosanguineous 10/15/2018 11:26 AM   Site Care Cleansed 10/15/2018 11:26 AM   Dressing Foam 10/23/2018  2:36 AM   Dressing Status  Intact;Dry;Clean 10/23/2018  2:36 AM     Pt has a non healing surgical wound to the right plantar foot.  There is slough at the base.  There is less erythema and there is no pus.  There is no bone and tendon exposed.  The dressings were all removed.  Using a pickup and scalpel, excisional debridement of the non healing surgical wound to the right plantar foot was done. Excised 1x1 cm of necrotic sq to healthy bleeding tissue. Devitalized and non-viable tissue were removed to improve granulation tissue formation, stimulate wound healing, decrease bacteria load, decrease wound edge senescence and disrupt biofilm formation. Patient tolerated procedure well. Wound appears /healther after debridement.   Hemostasis was well obtained.  She tolerated it well and no complications were noted. Dresssings were reapplied. Continue wet to moist dressings.     Alban Casanova MD

## 2021-06-21 NOTE — PROGRESS NOTES
[place] Hospitalist Daily Progress Note    Summary  Marlo Kearns is a 37 y.o. 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 Wound was cleaned at bedside . Mri done and didn't show  septic arthropathy, or abscess. Vascular  studies with normal thomas and right great toe pressures adequate for healing . Infectious Disease recommended a combination of cipro flagyl and vancomycin pending cultures( prelim with mssa and Gram negative bacilli).  She underwent debridement by podiatry in the operating room on 9/21/18.   because of her diastolic dysfunction, she was cleared for the procedure beforehand by cardiology. She was transitioned to clindamycin prior to transfer.      Assessment/Plan  Right foot infection S/p debridement by podiatry in OR on 9/21, discontinued abx on 10/2 due to rash, s/p bedside debridement with Dr. Casanova 10/9.  -WOC, cont with acetic acid irrigation to wound, s/p bedside I&D by plastic on 10/16. appt with podiatry on 10/18 with Dr. Kristina Rich - start santyl to wound daily, they are applying with insurance for primatrix skin graft approval.  Follow up on 11/15 at 1pm, cont current nwb status  -NWB right foot, ok for weight bearing for bathroom privileges only, off loading boot for commode transfers    Diastolic dysfunction  H/o Bradycardia  Echo in 11/2017 with grade 3 diastolic dysfunction, normal EF,  -outpt cardiology follow up, stress test as outpt  -continue torsemide 40 mg twice daily, bmp acceptable  -Continue Lopressor and potassium    H/o tobacco abuse /  H/o chronic alcohol use  No alcohol  and cigarettes since admission to [place] on 9/14.  At risk for dependence.  -minimize narcotics as able  -nicotine patch prn     Possible COPD/CARMENZA  Has not had sleep study in the past. Does not use O2 at home.  -albuterol nebs prn  -cont diuretics to keep dry (see above)  -sleep study as outpatient     Essential hypertension  Acceptable control.   anticipation/anxiety/pain.  -continue current meds     Seizure disorder (H)  -cont keppra and lamictal  -seizure precautions     Borderline personality disorder  -cont abilify, effexor, Zyprexa prn      Hemiplegia (H)  Cerebral artery occlusion with cerebral infarction (H)  Secondary to ruptured aneurysm  -PT/OT  -antivert prn for dizziness    Extensive maculopapular Rash-improved.  Improving, likely due to clindamycin, s/p prednisone.    DM2.  Hgbaic 5.3 on 9/24, requiring minimal insulin sliding scale.  Accucheck as needed.        Code status:  Full Code - d/w patient at bedside. Father can make decisions if pt unable to do so       GI prophylaxis:pepcid  DVT prophylaxis:HSQ    Subjective:  No new issues.  Had appt yesterday       Objective:  Vital signs in last 24 hours:  Temp:  [97.3  F (36.3  C)-98.5  F (36.9  C)] 98.5  F (36.9  C)  Heart Rate:  [75-89] 87  Resp:  [18-20] 20  BP: (113-178)/(55-94) 144/80  Weight:   (!) 372 lb 4.8 oz (168.9 kg)    Intake/Output last 3 shifts:  I/O last 3 completed shifts:  In: 720 [P.O.:720]  Out: -   Intake/Output this shift:  I/O this shift:  In: 240 [P.O.:240]  Out: -     Physical Exam:  General Appearance: NAD in bed  Throat: no thrush noted  Lungs: CTAB  Heart: Regular rate and rhythm, bilat LE with chronic lymphedema  Abdomen: Soft, nttp, ND  Extremities: Dressing on right lower ext  Skin: no new skin rash noted on limited skin exam   Neuro: Alert and oriented x 3, nonfocal      Imaging and Lab Results - I have personally  reviewed the latest labs and imaging test results  No results found for this or any previous visit (from the  past 24 hour(s)).    D/w patient, and covering RN    Disposition/Follow up:  Medically ready for dc to TCU, when available.  Obesity will likely limit her options.    All medication issues identified within the last 24 hours have been addressed and completed as appropriate.

## 2021-06-21 NOTE — PROGRESS NOTES
[place] Hospitalist Daily Progress Note    Summary  Marlo Kearns is a 37 y.o. 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 Wound was cleaned at bedside . Mri done and didn't show  septic arthropathy, or abscess. Vascular  studies with normal thomas and right great toe pressures adequate for healing . Infectious Disease recommended a combination of cipro flagyl and vancomycin pending cultures( prelim with mssa and Gram negative bacilli).  She underwent debridement by podiatry in the operating room on 9/21/18.   because of her diastolic dysfunction, she was cleared for the procedure beforehand by cardiology. She was transitioned to clindamycin prior to transfer.      Assessment/Plan  Right foot infection S/p debridement by podiatry in OR on 9/21, discontinued abx on 10/2 due to rash, s/p bedside debridement with Dr. Casanova 10/9.  -WOC, cont with acetic acid irrigation to wound, s/p bedside I&D by plastic on 10/16. appt with podiatry on 10/18 with Dr. Kristina Rich - start santyl to wound daily, they are applying with insurance for primatrix skin graft approval.  Follow up on 11/15 at 1pm, cont current nwb status  -NWB right foot, ok for weight bearing for bathroom privileges only, off loading boot for commode transfers    Diastolic dysfunction  H/o Bradycardia  Echo in 11/2017 with grade 3 diastolic dysfunction, normal EF,  -outpt cardiology follow up, stress test as outpt  -continue torsemide 40 mg twice daily, give extra dose today  -Continue Lopressor and potassium    H/o tobacco abuse /  H/o chronic alcohol use  No  alcohol and cigarettes since admission to [place] on 9/14.  At risk for dependence.  -minimize narcotics as able  -nicotine patch prn     Possible COPD/CARMENZA  Has not had sleep study in the past. Does not use O2 at home.  -albuterol nebs prn  -cont diuretics to keep dry (see above)  -sleep study as outpatient     Essential hypertension  Acceptable control.   anticipation/anxiety/pain.  -continue current meds     Seizure disorder (H)  -cont keppra and lamictal  -seizure precautions     Borderline personality disorder  -cont abilify, effexor, Zyprexa prn      Hemiplegia (H)  Cerebral artery occlusion with cerebral infarction (H)  Secondary to ruptured aneurysm  -PT/OT  -antivert prn for dizziness    Extensive maculopapular Rash-improved.  Improving, likely due to clindamycin, s/p prednisone.    DM2.  Hgbaic 5.3 on 9/24, requiring minimal insulin sliding scale.  Accucheck as needed.        Code status:  Full Code - d/w patient at bedside. Father can make decisions if pt unable to do so       GI prophylaxis:pepcid  DVT prophylaxis:HSQ    Subjective:  C/o increasing edema in bilat legs, otherwise no new issues.       Objective:  Vital signs in last 24 hours:  Temp:  [98.2  F (36.8  C)-98.7  F (37.1  C)] 98.2  F (36.8  C)  Heart Rate:  [74-83] 83  Resp:  [16-18] 18  BP: (119-143)/(61-87) 143/78  Weight:   (!) 372 lb 4.8 oz (168.9 kg)    Intake/Output last 3 shifts:  I/O last 3 completed shifts:  In: 1555 [P.O.:1555]  Out: -   Intake/Output this shift:       Physical Exam:  General Appearance: NAD in bed  Throat: no thrush noted  Lungs: CTAB  Heart: Regular rate and rhythm, bilat LE with chronic lymphedema, slightly increased today   Abdomen: Soft, nttp, ND  Extremities: Dressing on right lower ext  Skin: no new skin rash noted on limited skin exam   Neuro: Alert and oriented x 3, nonfocal      Imaging and Lab Results - I have personally  reviewed the latest labs and imaging test results  No results found for this or any  previous visit (from the past 24 hour(s)).    D/w patient, and covering RN    Disposition/Follow up:  Medically ready for dc to TCU, when available.  Obesity will likely limit her options.    All medication issues identified within the last 24 hours have been addressed and completed as appropriate.

## 2021-06-21 NOTE — PROGRESS NOTES
Spiritual Health Services Note:    Spiritual Assessment:  I reviewed Marlo's chart as appropriate before going to see her. She was sleeping when I went by her room and she did not respond to my knock or calling her name. I was unable to update her spiritual assessment at this time.    Care Provided:  Reviewed Marlo's chart. Attempted to provide spiritual support. I chose not to wake her to allow for on-going rest and healing.    Plan of Care:  I will attempt to follow up with Marlo at a later time. I will continue to make additional visits as requested by Marlo, her family or other visitors, and as referred by staff.    Shay Khalil MDiv.  Lead    [phone]

## 2021-06-21 NOTE — PROGRESS NOTES
[facility]ist Daily Progress Note    Summary  Marlo Kearns is a 37 y.o. 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 Wound was cleaned at bedside . Mri done and didn't show  septic arthropathy, or abscess. Vascular  studies with normal thomas and right great toe pressures adequate for healing . Infectious Disease recommended a combination of cipro flagyl and vancomycin pending cultures( prelim with mssa and Gram negative bacilli).  She underwent debridement by podiatry in the operating room on 9/21/18.   because of her diastolic dysfunction, she was cleared for the procedure beforehand by cardiology. She was transitioned to clindamycin prior to transfer.     Postoperatively, she is strict non weight bearing right lower extremity. Her CRP has decreased from 15.36 at admission to 5.90 on 9/22/18. She was transferred to [facility] on 9/26/18.    Assessment/Plan  Right foot infection S/p debridement by podiatry in OR on 9/21, discontinued abx on 10/2 due to rash, s/p bedside debridement with Dr. Casanova 10/9.  -WOC, cont with acetic acid irrigation to wound, s/p bedside I&D by plastic on 10/16. appt with podiatry on 10/18 with Dr. Kristina Rich to address wb status  -NWB right foot until seen by podiatry, ok for weight bearing for bathroom privileges only, off loading boot for commode transfers    Diastolic dysfunction  H/o Bradycardia  Echo in 11/2017 with grade 3 diastolic dysfunction, normal EF,  -outpt cardiology follow up, stress test as outpt  -continue torsemide 40 mg twice daily, bmp acceptable  -Continue  Lopressor and potassium    H/o tobacco abuse /  H/o chronic alcohol use  No alcohol and cigarettes since admission to [facility] on 9/14.  At risk for dependence.  -minimize narcotics as able  -nicotine patch prn     Possible COPD/CARMENZA  Has not had sleep study in the past. Does not use O2 at home.  -albuterol nebs prn  -cont diuretics to keep dry (see above)  -sleep study as outpatient     Essential hypertension  Acceptable control.   anticipation/anxiety/pain.  -continue current meds     Seizure disorder (H)  -cont keppra and lamictal  -seizure precautions     Borderline personality disorder  -cont abilify, effexor, Zyprexa prn      Hemiplegia (H)  Cerebral artery occlusion with cerebral infarction (H)  Secondary to ruptured aneurysm  -PT/OT  -antivert prn for dizziness    Extensive maculopapular Rash-improved.  Improving, likely due to clindamycin, s/p prednisone.    DM2.  Hgbaic 5.3 on 9/24, requiring minimal insulin sliding scale.  Accucheck as needed.        Code status:  Full Code - d/w patient at bedside. Father can make decisions if pt unable to do so       GI prophylaxis:pepcid  DVT prophylaxis:HSQ    Subjective:  C/o bilat shoulder pain which she says is chronic for her.  No other new issues.      Objective:  Vital signs in last 24 hours:  Temp:  [97.9  F (36.6  C)-98.4  F (36.9  C)] 98.4  F (36.9  C)  Heart Rate:  [75-89] 89  Resp:  [18] 18  BP: (107-158)/(49-82) 144/77  Weight:   (!) 372 lb 4.8 oz (168.9 kg)    Intake/Output last 3 shifts:  I/O last 3 completed shifts:  In: 1170 [P.O.:1170]  Out: -   Intake/Output this shift:       Physical Exam:  General Appearance: NAD in chair   Throat: no thrush noted  Lungs: CTAB  Heart: Regular rate and rhythm, bilat LE with chronic lymphedema  Abdomen: Soft, nttp, ND  Extremities: Dressing on right lower ext  Skin: no new skin rash noted on limited skin exam   Neuro: Alert and oriented x 3, nonfocal      Imaging and Lab Results - I have personally  reviewed the latest  labs and imaging test results  No results found for this or any previous visit (from the past 24 hour(s)).    D/w patient, and covering RN    Disposition/Follow up:  Medically ready for dc to TCU, when available.  Obesity will likely limit her options.    All medication issues identified within the last 24 hours have been addressed and completed as appropriate.

## 2021-06-21 NOTE — PROGRESS NOTES
Plastic Surgery Assessment Note    Today's Visit:  Pt is doing well and has no concerns or complaints.  Pt is tolerating the dressings well.  Pt has no fever or chills.      Labs:  Recent Labs      10/15/18   0759   CRP  5.3*       Vitals:  Vitals:    10/16/18 0914   BP: 126/59   Pulse: 83   Resp:    Temp:    SpO2:        Chidi:  Chidi Scale Score: 16  Specialty Bed: [facility] 10A/arise 1000  (!) 369 lb 1.6 oz (167.4 kg)  Body mass index is 67.51 kg/(m^2).    Output:  Urine Occurrence: 1   Urine: 100 mL  Urinary Incontinence:  (No pt used commode)  Stool Occurrence: 1          Physical Exam:  General Appearance:  Appears comfortable, Alert, cooperative, no distress,   Head: Normocephalic, without obvious abnormality, atraumatic  Eyes: PERRL, conjunctiva/corneas clear, EOM's intact, both eyes   Nose: Nares normal, no drainage   Throat: Lips, mucosa, and tongue normal; teeth and gums normal  Neck: Supple, symmetrical, trachea midline, no adenopathy;    Lungs: Clear to auscultation bilaterally, respirations unlabored  Chest Wall: No tenderness or deformity  Heart: Regular rate and rhythm, S1 and S2 normal, no murmur, rubs or gallop  Abdomen: Soft, non-tender, bowel sounds active all four quadrants,   no masses, no organomegaly  Extremities: Extremities normal, atraumatic, no cyanosis or edema  Pulses: DP pulses are 1-2+ bilat.    Skin: no rashes or lesions  Neurologic: normal and equal strength bilat in upper and lower extremities    Wound Ostomy Assessment/Plan:  Wound 09/26/18 Foot Right;Posterior (back) (Active)   Site Assessment Covered, not assessed 10/16/2018  1:00 AM   Surrounding Tissue Assessment Covered, not assessed 10/16/2018  1:00 AM   Drainage Amount Moderate 10/15/2018  6:32 PM   Drainage Description Serosanguineous 10/15/2018  6:32 PM   Site Care Cleansed 10/15/2018  6:32 PM   Dressing Moist gauze;Dry gauze 10/15/2018  6:32 PM   Dressing Status  Intact 10/16/2018  1:00 AM       Wound 09/26/18 Laceration  Arm Right;Anterior (front) (Active)   Site Assessment Covered, not assessed 10/16/2018  1:00 AM   Surrounding Tissue Assessment Covered, not assessed 10/16/2018  1:00 AM   Closure Covered, not assessed 10/15/2018  6:32 PM   Drainage Amount Scant 10/15/2018 11:26 AM   Drainage Description Serosanguineous 10/15/2018 11:26 AM   Site Care Cleansed 10/15/2018 11:26 AM   Dressing Silver foam 10/15/2018  6:32 PM   Dressing Status  Intact 10/16/2018  1:00 AM     Pt has a non healing surgical wound to the right plantar foot.  There is slough at the base.  There is no erythema or pus.  There is no bone and tendon exposed.  The dressings were all removed.  Using a pickup and scalpel, excisional debridement of the non healing surgical wound to the right plantar foot was done. Excised 1x1 cm of necrotic sq to healthy bleeding tissue. Devitalized and non-viable tissue were removed to improve granulation tissue formation, stimulate wound healing, decrease bacteria load, decrease wound edge senescence and disrupt biofilm formation. Patient tolerated procedure well. Wound appears /healther after debridement.   Hemostasis was well obtained.  She tolerated it well and no complications were noted. Dresssings were reapplied. Continue wet to moist dressings.    Alban Casanova MD

## 2021-06-25 NOTE — PROGRESS NOTES
"Marlo Kearns is a 40 y.o. female who is being evaluated via a billable telephone visit.      The patient has been notified of following:     \"This telephone visit will be conducted via a call between you and your physician/provider. We have found that certain health care needs can be provided without the need for a physical exam.  This service lets us provide the care you need with a short phone conversation.  If a prescription is necessary we can send it directly to your pharmacy.  If lab work is needed we can place an order for that and you can then stop by our lab to have the test done at a later time.    Telephone visits are billed at different rates depending on your insurance coverage. During this emergency period, for some insurers they may be billed the same as an in-person visit.  Please reach out to your insurance provider with any questions.    If during the course of the call the physician/provider feels a telephone visit is not appropriate, you will not be charged for this service.\"    Patient has given verbal consent to a Telephone visit? Yes    What phone number would you like to be contacted at? [phone]    Patient would like to receive their AVS by AVS Preference: E-Mail (Inform patient AVS not encrypted with this option).    Phone call duration: 15 minutes    Odette Camargo RD          Medical  Weight Loss Follow-Up Diet Evaluation  Assessment:  Marlo is presenting today for a follow up weight management nutrition consultation. Pt has had an initial appointment with Dr. Naqvi.   Weight loss medication: Victoza .   Pt's No data recorded  BMI: There is no height or weight on file to calculate BMI.  Patient weight not recorded   Initial Weight: 374 lbs   Current Weight: 380 lbs     Estimated RMR ([place]-St Jeor equation):   2340 kcals x 1.2 (sedentary) = 2808 kcals (for weight maintenance)  Recommended Protein Intake: 60-80 grams of protein/day  Patient Active Problem List:  Patient " Active Problem List   Diagnosis     Cerebral edema (H)     Cerebral artery occlusion with cerebral infarction (H)     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     Borderline personality disorder (H)     Encephalopathy     Essential hypertension     Intraventricular hemorrhage (H)     Hemiplegia (H)     Need for prophylactic measure     Physical deconditioning     Malnutrition (H)     TBI (traumatic brain injury) (H)     Unspecified episodic mood disorder     Diastolic dysfunction     Seizure disorder (H)     Wound of right foot     Foot infection     Bipolar disorder (H)     Tobacco dependence     Septicemia (H)     S/P coil embolization of cerebral aneurysm     Pulmonary hypertension (H)     PTSD (post-traumatic stress disorder)     CARMENZA (obstructive sleep apnea)     Neuropathy     Morbid obesity with BMI of 60.0-69.9, adult (H)     Class 3 obesity with alveolar hypoventilation, serious comorbidity, and body mass index (BMI) of 60.0 to 69.9 in adult (H)     Microscopic hematuria     Lung nodules     Hyperglycemia     Hyperlipidemia, unspecified hyperlipidemia type     History of methamphetamine abuse (H)     Hemiparesis affecting right side as late effect of cerebrovascular accident (CVA) (H)     Heartburn     H/O spont intraparenchymal intracranial hemorrhage d/t cerebral AVM     Chest pain     Anxiety     Arteriovenous malformation of brain     Aphasia as late effect of cerebrovascular accident     Alcohol abuse, episodic     Adjustment disorder with depressed mood     Acute on chronic diastolic CHF (congestive heart failure) (H)     Lymphedema     Chronic pain     Hemiplegia of dominant side as late effect of cerebrovascular disease (H)     Impaired mobility     Mild persistent asthma without complication     Neuropathy of both feet     Shoulder joint pain     Diabetes: No     Progress on goals from last visit: Trying to get back on track with everything, still dealing with a recent breakup with her  boyfriend and noted that she is slowly healing from that.  Patient utilizes the MOM's program (Low Sodium meals) to help with meal planning/eating better, however notices that she eats more than what she should even if she isn't hungry.  Patient has been working on reducing her eating out frequency as well, now that she is using the MOM's meal program.   Patient reports that she is working on increased water consumption, however is not always consistent with this.      Exercise: arm exercises daily-could certainly increase, has a wound on her foot which makes it difficult to walk     Nutrition Diagnosis:    Overweight/Obesity (NC 3.3) related to overeating and poor lifestyle habits as evidenced by patient's subjective statements (excessive energy intake, lack of exercise) and BMI of 69.6 kg/m2.     Intervention:  1. Food and/or nutrient delivery: balanced meals, adequate protein   2. Nutrition education: portion sizes   3. Nutrition counseling: exercise regimen    Monitoring/Evaluation:    Goals:  1. Reduce portion sizes at meal, trying 1/2 MOM's meal vs whole meal.       Patient to follow up in 2 week(s) with ALBA

## 2021-06-26 NOTE — PROGRESS NOTES
"Marlo Kearns is a 40 y.o. female who is being evaluated via a billable telephone visit.      The patient has been notified of following:     \"This telephone visit will be conducted via a call between you and your physician/provider. We have found that certain health care needs can be provided without the need for a physical exam.  This service lets us provide the care you need with a short phone conversation.  If a prescription is necessary we can send it directly to your pharmacy.  If lab work is needed we can place an order for that and you can then stop by our lab to have the test done at a later time.    Telephone visits are billed at different rates depending on your insurance coverage. During this emergency period, for some insurers they may be billed the same as an in-person visit.  Please reach out to your insurance provider with any questions.    If during the course of the call the physician/provider feels a telephone visit is not appropriate, you will not be charged for this service.\"    Patient has given verbal consent to a Telephone visit? Yes    What phone number would you like to be contacted at? [phone]    Patient would like to receive their AVS by AVS Preference: E-Mail (Inform patient AVS not encrypted with this option).    Additional provider notes: insert own note template here     Phone call duration: 25 minutes    Odette Camargo RD          Medical  Weight Loss Follow-Up Diet Evaluation  Assessment:  Marlo is presenting today for a follow up weight management nutrition consultation. Pt has had an initial appointment with Dr. Naqvi  Weight loss medication: victoza.   Pt's No data recorded  BMI: There is no height or weight on file to calculate BMI.  Patient weight not recorded  Initial Weight: 374 lbs   Current Weight: 390 lbs (did mention that she is dealing with fluid retention)   Estimated RMR ([place]-St Jeor equation):   2385 kcals x 1.2 (sedentary) = 2862 kcals (for weight " maintenance  Recommended Protein Intake: 60-80 grams of protein/day  Patient Active Problem List:  Patient Active Problem List   Diagnosis     Cerebral edema (H)     Cerebral artery occlusion with cerebral infarction (H)     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     Borderline personality disorder (H)     Encephalopathy     Essential hypertension     Intraventricular hemorrhage (H)     Hemiplegia (H)     Need for prophylactic measure     Physical deconditioning     Malnutrition (H)     TBI (traumatic brain injury) (H)     Unspecified episodic mood disorder     Diastolic dysfunction     Seizure disorder (H)     Wound of right foot     Foot infection     Bipolar disorder (H)     Tobacco dependence     Septicemia (H)     S/P coil embolization of cerebral aneurysm     Pulmonary hypertension (H)     PTSD (post-traumatic stress disorder)     CARMENZA (obstructive sleep apnea)     Neuropathy     Morbid obesity with BMI of 60.0-69.9, adult (H)     Class 3 obesity with alveolar hypoventilation, serious comorbidity, and body mass index (BMI) of 60.0 to 69.9 in adult (H)     Microscopic hematuria     Lung nodules     Hyperglycemia     Hyperlipidemia, unspecified hyperlipidemia type     History of methamphetamine abuse (H)     Hemiparesis affecting right side as late effect of cerebrovascular accident (CVA) (H)     Heartburn     H/O spont intraparenchymal intracranial hemorrhage d/t cerebral AVM     Chest pain     Anxiety     Arteriovenous malformation of brain     Aphasia as late effect of cerebrovascular accident     Alcohol abuse, episodic     Adjustment disorder with depressed mood     Acute on chronic diastolic CHF (congestive heart failure) (H)     Lymphedema     Chronic pain     Hemiplegia of dominant side as late effect of cerebrovascular disease (H)     Impaired mobility     Mild persistent asthma without complication     Neuropathy of both feet     Shoulder joint pain     Diabetes: No     Progress on goals  from last visit: Has been working on increased vegetables and fruit.  Patient reports that she continues to receive MOM's meals (which are low sodium and portioned out) 7 days/week.  Patient reports that she has been trying to work on cutting the MOM's meals in 1/2, however isn't doing this consistently. Patient trying to increase her protein intake as well, noting that she has incorporated more eggs into her diet.     Exercise: Limited due to wounds on her feet-painful, arm exercises daily    Nutrition Diagnosis:    Overweight/Obesity (NC 3.3) related to overeating and poor lifestyle habits as evidenced by patient's subjective statements (excessive energy intake, lack of exercise) and BMI 71.5 kg/m2.      Intervention:  1. Food and/or nutrient delivery: balanced meals, adequate protein   2. Nutrition education: portion sizes, sodium intake   3. Nutrition counseling: exercise regimen     Monitoring/Evaluation:    Goals:  1. Monitor intake of water, focus on adequate hydration.   2. Try to cut back on high sodium food choices.      Patient to follow up in 2 week(s) with ALBA

## 2021-06-29 NOTE — PROGRESS NOTES
Progress Notes by Micki Santamaria MD at 10/26/2020  2:45 PM     Author: Micki Santamaria MD Service: -- Author Type: Physician    Filed: 10/26/2020  5:03 PM Encounter Date: 10/26/2020 Status: Signed    : Micki Santamaria MD (Physician)             [facility] Lymphedema/Swelling Consult    Referred By: Kia Villalpando PA-C    Date Of Service: 10/26/2020    Chief Complaint: bilateral leg swelling    History of Present Illness:    Marlo Kearns presents to the [facility] as a new consult to evaluate bilateral leg swelling.  The swelling began about 4 years ago.  She has a history of CARMENZA, bipolar disorder, CHF, AVM bleed with L CVA and right hemiplegia (2015),  asthma, borderline glucose and morbid obesity.  There was no associated trauma, medication change or major illness.  Previous treatments have included elevation, diuretics, short stretch compression bandaging, velcro compression and compression socks.  She says the stockings and compression have never worked as they keep falling down.  The swelling been stable since onset.  Discomfort from the swelling is described as achey.  The swelling is improved with elevation.  The patient sleeps in a bed.  She smokes 1 pack of cigarettes a day.  There has been no new numbness, tingling or weakness.  There have been no new masses, rashes, or swellings of any other joints. There has been no new decrease in appetite, unexplained weight loss, abdominal bloating, bowel or bladder changes and irregular bleeding.  She has gained over 100 pounds since the stroke.      Past Medical History:   Diagnosis Date   ? Acute renal failure (H)    ? MEGHA (acute kidney injury) (H)    ? Aphasia    ? AV malformation, acquired (H)    ? AVM (arteriovenous malformation) brain     left, ruptured   ? Bipolar affective (H)    ? Borderline personality disorder (H)    ? Cerebral edema (H)    ? Elevated glucose    ?  Encephalopathy    ? Encephalopathy    ? Headache    ? Hemiparesis (H)    ? Hypertension    ? Intraventricular hemorrhage (H)    ? Morbid obesity (H)    ? Pneumonia    ? Polysubstance abuse (H)    ? Polysubstance abuse (H)    ? PTSD (post-traumatic stress disorder)    ? Renal failure    ? Stroke (H)    ? Suicidal ideation    ? UTI (urinary tract infection)        Past Surgical History:   Procedure Laterality Date   ? INCISION AND DRAINAGE FOOT Right 2018   ? kidney stone removal     ? OVARIAN CYST REMOVAL     ? PICC  09/29/2018        ? TONSILLECTOMY           Current Outpatient Medications:   ?  acetaminophen (TYLENOL) 500 MG tablet, Take 500 mg by mouth every 6 (six) hours as needed. , Disp: , Rfl:   ?  albuterol (PROVENTIL) 2.5 mg /3 mL (0.083 %) nebulizer solution, Take 3 mL (2.5 mg total) by nebulization every 4 (four) hours as needed for wheezing., Disp: , Rfl: 0  ?  amLODIPine (NORVASC) 10 MG tablet, Take 1 tablet (10 mg total) by mouth every morning. Hold for SBP < 110, Disp: , Rfl: 0  ?  ARIPiprazole (ABILIFY) 5 MG tablet, TAKE 1 TABLET (5 MG TOTAL) BY MOUTH DAILY., Disp: 30 tablet, Rfl: 4  ?  bisacodyl (DULCOLAX) 10 mg suppository, Daily as needed for constipation, Disp: , Rfl: 0  ?  bumetanide (BUMEX) 2 MG tablet, Take 2 mg by mouth 2 (two) times a day., Disp: , Rfl:   ?  collagenase ointment, Apply 1 application topically daily. To right foot, Disp: , Rfl:   ?  diclofenac sodium (VOLTAREN) 1 % Gel, Apply topically 2 (two) times a day., Disp: , Rfl:   ?  fluticasone propion-salmeteroL (ADVAIR) 100-50 mcg/dose DISKUS, Inhale 1 puff every 12 (twelve) hours., Disp: , Rfl:   ?  folic acid (FOLVITE) 1 MG tablet, Take 1 tablet (1 mg total) by mouth daily., Disp: , Rfl: 0  ?  gabapentin (NEURONTIN) 300 MG capsule, Take 300 mg by mouth 2 (two) times a day. 300 mg in the morning an din the afternoon, Disp: , Rfl:   ?  gabapentin (NEURONTIN) 300 MG capsule, Take 600 mg by mouth at bedtime., Disp: , Rfl:   ?   lamoTRIgine (LAMICTAL) 150 MG tablet, Take 150 mg by mouth 2 (two) times a day., Disp: , Rfl:   ?  levETIRAcetam (KEPPRA) 500 MG tablet, Take 750 mg by mouth 2 (two) times a day. , Disp: , Rfl:   ?  meclizine (ANTIVERT) 25 mg tablet, Take 25 mg by mouth 3 (three) times a day as needed for dizziness., Disp: , Rfl:   ?  methocarbamoL (ROBAXIN) 500 MG tablet, Take 750 mg by mouth every 8 (eight) hours as needed. Shoulder pain, Disp: , Rfl:   ?  miconazole nitrate (CRITIC-AID AF) 2 % Oint ointment, Apply to perianal rash, anterior thigh, Disp: , Rfl: 0  ?  miscellaneous medical supply [facility], New CPAP machine for home use at pressure: 10-20 cmw , Heated humidifier x 1 q 5yr, water chamber x 1 q 6 mo,  chin strap x 1 q 6 mo,  Full Face Mask x 1 q 3mo, Full face cushion x 1 q mo, Heated PAP tubing x 1 q 3 mo, Headgear x 1 q 6 mo, non-disposable filters x 1 q 6 mo, disposable filter x 2 q mo; Length of Need: 99 months; Frequency of use: Daily, Disp: , Rfl:   ?  multivitamin (ONE A DAY) per tablet, Take 1 tablet by mouth daily., Disp: , Rfl:   ?  nicotine (NICODERM CQ) 14 mg/24 hr, Place 1 patch on the skin daily., Disp: , Rfl:   ?  spironolactone (ALDACTONE) 25 MG tablet, Take 25 mg by mouth daily before breakfast., Disp: , Rfl:   ?  venlafaxine (EFFEXOR-XR) 37.5 MG 24 hr capsule, TAKE 1 CAPSULE (37.5 MG TOTAL) BY MOUTH DAILY., Disp: 30 capsule, Rfl: 4  ?  white petrolatum (AQUAPHOR NATURAL HEALING) 41 % Oint, To dry skin, Disp: , Rfl: 0  ?  bisoprolol (ZEBETA) 5 MG tablet, Take 2.5 mg by mouth daily., Disp: , Rfl:   ?  famotidine (PEPCID) 20 MG tablet, Take 20 mg by mouth 2 (two) times a day. , Disp: , Rfl:   ?  fish oil-omega-3 fatty acids 300-1,000 mg capsule, Take 1 capsule by mouth 2 (two) times a day., Disp: , Rfl:   ?  ipratropium-albuteroL (DUO-NEB) 0.5-2.5 mg/3 mL nebulizer, Inhale every 4 (four) hours., Disp: , Rfl:   ?  Rusty Therapuetic Nutrition (RUSTY) 7-7-1.5 gram PwPk, Take 1 packet by mouth 2 (two) times a  day., Disp: , Rfl: 0  ?  metoprolol tartrate (LOPRESSOR) 25 MG tablet, Take 0.5 tablets (12.5 mg total) by mouth 2 (two) times a day. Hold for SBP < 100, HR < 60, Disp: , Rfl: 0  ?  multivitamin with minerals (THERA-M) 9 mg iron-400 mcg Tab tablet, Take 1 tablet by mouth daily., Disp: , Rfl: 0  ?  OLANZapine (ZYPREXA) 2.5 MG tablet, TAKE 1 TABLET BY MOUTH THREE TIMES A DAY AS NEEDED FOR AGITATION, Disp: 90 tablet, Rfl: 4  ?  potassium chloride (K-DUR,KLOR-CON) 20 MEQ tablet, Take 20 mEq by mouth 2 (two) times a day with meals., Disp: , Rfl:   ?  torsemide (DEMADEX) 20 MG tablet, Take 40 mg by mouth 2 (two) times a day at 9am and 6pm., Disp: , Rfl:     Allergies   Allergen Reactions   ? Fish Containing Products Anaphylaxis     Can eat seafood or salt water, but reacts to freshwater fish.  Anaphylaxis     Can eat seafood or salt water, but reacts to freshwater fish.  Anaphylaxis    ? Banana Itching   ? Clindamycin      rash   ? Lisinopril      Other reaction(s): kidney failure, Renal Failure   ? Prednisone      Other reaction(s): Insomnia, insomnia/mood swings   ? Ancef [Cefazolin] Rash     Rash     ? Keflex [Cephalexin] Rash     Rash    ? Sulfa (Sulfonamide Antibiotics) Rash     Rash        Social History     Socioeconomic History   ? Marital status: Single     Spouse name: Not on file   ? Number of children: Not on file   ? Years of education: Not on file   ? Highest education level: Not on file   Occupational History   ? Not on file   Social Needs   ? Financial resource strain: Not on file   ? Food insecurity     Worry: Not on file     Inability: Not on file   ? Transportation needs     Medical: Not on file     Non-medical: Not on file   Tobacco Use   ? Smoking status: Current Every Day Smoker     Packs/day: 0.50   ? Smokeless tobacco: Never Used   Substance and Sexual Activity   ? Alcohol use: Yes   ? Drug use: Yes     Types: Methamphetamines, Cocaine   ? Sexual activity: Not Currently   Lifestyle   ? Physical  activity     Days per week: Not on file     Minutes per session: Not on file   ? Stress: Not on file   Relationships   ? Social connections     Talks on phone: Not on file     Gets together: Not on file     Attends [employer] service: Not on file     Active member of club or organization: Not on file     Attends meetings of clubs or organizations: Not on file     Relationship status: Not on file   ? Intimate partner violence     Fear of current or ex partner: Not on file     Emotionally abused: Not on file     Physically abused: Not on file     Forced sexual activity: Not on file   Other Topics Concern   ? Not on file   Social History Narrative   ? Not on file       Family History   Problem Relation Age of Onset   ? Heart disease Mother    ? Hypertension Mother    ? Diabetes Mother    ? Arthritis Mother    ? Asthma Mother    ? Other Mother         bleeding disorder   ? Obesity Mother    ? Mental illness Mother    ? Hyperlipidemia Mother    ? Heart disease Brother    ? Alcohol abuse Brother         alcohol/drug problem   ? Asthma Brother    ? Hyperlipidemia Brother    ? Hypertension Father    ? Arthritis Father    ? Alcohol abuse Father         alcohol/drug problem   ? Asthma Maternal Grandmother    ? Arthritis Maternal Grandmother    ? Diabetes Maternal Grandfather    ? Heart disease Maternal Grandfather    ? Hypertension Maternal Grandfather    ? Kidney disease Maternal Grandfather    ? Hyperlipidemia Maternal Grandfather        Review of Systems:  Review of systems is otherwise negative, except as noted above.  Full 12 point review of systems was completed.    Radiology/Diagnostic Studies:    I personally reviewed the following imaging results today and those on care everywhere, if indicated    Echocardiogram 2/3/2020:      Final Conclusion        Previous Study:  11/25/2019        Technically challenging echocardiogram.        1. Borderline decreased left ventricular systolic function. Estimated left ventricular  ejection fraction is 50-55%.        2. Right ventricular chamber enlargement. Mildly decreased right ventricular systolic function.        3. Cardiac valves were not well visualized but there is no significant stenosis or regurgitation identified by Doppler interrogation.        4. Estimated right ventricular systolic pressure is 61 mmHg.        When compared to the previous echocardiographic images of 11/25/2019, there has been no significant change.        Estimated EF:               50-55%      EXAM: CT LOWER EXTREMITY LEFT W  LOCATION: [facility] MEDICAL IMAGING  DATE/TIME: 7/29/2020 5:50 PM    INDICATION: Cellulitis, evaluate for abscess.  COMPARISON: None.  TECHNIQUE: IV contrast. Axial, sagittal and coronal thin-section reconstruction.  Dose reduction techniques were used.   CONTRAST: Iohexol 350 mg Iodine/mL  mL Bottle: 100 mL.    FINDINGS:   No evidence of osteomyelitis. No fracture. No discrete fluid collection to  suggest an abscess. Moderate circumferential subcutaneous cellulitis surrounding  the entire length of the calf.    There are some more localized confluent subcutaneous soft tissue changes along  the anterior margin of the distal tibial diaphysis. No evidence of a  well-defined abscess.    IMPRESSION:  1.  No evidence of osteomyelitis.    2.  Moderate circumferential subcutaneous cellulitis throughout the calf with  some localized changes in the anterior subcutaneous soft tissues of the distal  tibial diaphysis without evidence of a well-defined abscess.    EXAM: US VENOUS LOWER EXTREMITY LEFT  LOCATION: [facility] MEDICAL IMAGING  DATE/TIME: 7/27/2020 12:58 PM    INDICATION: Left leg pain  COMPARISON: None.  TECHNIQUE: Venous Duplex ultrasound of the left lower extremity with and without  compression, augmentation and duplex. Color flow and spectral Doppler with  waveform analysis performed.    FINDINGS: Exam includes the common femoral, femoral, popliteal, and  contralateral common femoral veins as  well as segmentally visualized deep calf  veins and greater saphenous vein.     LEFT: No deep vein thrombosis. No superficial thrombophlebitis. No popliteal  cyst.    IMPRESSION:  1.  No deep venous thrombosis in the left lower extremity.     Laboratory Studies:  I personally reviewed the following lab results today and those on care everywhere, if indicated      No results found for: SEDRATE      Lab Results   Component Value Date    CRP 5.3 (H) 10/15/2018           Lab Results   Component Value Date    CREATININE 0.73 06/30/2020      Lab Results   Component Value Date    HGBA1C 5.3 06/30/2020           Lab Results   Component Value Date    BUN 9 06/30/2020              Lab Results   Component Value Date    ALBUMIN 3.7 06/30/2020       No results found for: [id number]    Lab Results   Component Value Date    TSH 2.99 02/22/2017     Lab Results   Component Value Date    WBC 7.9 10/29/2018    HGB 11.4 (L) 10/29/2018    HCT 36.7 10/29/2018    MCV 91 10/29/2018     10/29/2018       8/18/20  Glu 110   BUN 10  Cr 0.72  GFR >60 WBC 8.0  Hgb 12.6   plt 206    7/27/20   HgbA1c  5.6   SR 38   CRP  12.59        Physical Exam:  Vitals:    10/26/20 1514   Pulse: 60   Resp: 16   Temp: 98  F (36.7  C)     BMI 68.41   Weight 374 pounds     See flow chart for circumferential measures.    Vasc Edema 10/26/2020   Right just above MTP 25.5   Right Ankle 325   Right Widest Calf 68   Right Thigh Up 10cm 80.8   Left - just above MTP 25.5   Left Ankle 35.5   Left Widest Calf 63.5   Left Thigh Up 10cm 81       Wound 09/26/18 Foot Right;Posterior (back) (Active)       Wound 09/26/18 Laceration Arm Right;Anterior (front) (Active)        General:  39 y.o. female in no apparent distress.      Psych: Alert and oriented x 3.  Cooperative. Affect normal.    HEENT: Atraumatic, normocephalic    Respiratory:  Lungs have severely decreased breath sounds throughout.     Cardiovascular: Normal S1, S2 without murmur, gallop or rub.  No audible  carotid bruits. Regular rhythm.     Abdominal: Normal bowel sounds without any pain, guarding or rigidity. No inguinal lymphadenopathy palpated.  Exam compromised by morbid obesity.      Musculoskeletal:  Normal range of motion in elbows, wrists, hips, knees and ankles bilaterally.  Left shoulder with full range of motion.  Right to 145 deg forward flexion and abduction.  There is no active joint synovitis, erythema, swelling or joint laxity.     Neurological:  Sensation is decreased on right arm and leg.   Strength testing is normal in hip flexion, knee flexion, knee extension, ankle dorsiflexion and great toe extension bilaterally.   Right shoulder abduction, elbow flexion and extension is 4/5/  There is normal left shoulder abduction, elbow flexion/extension with bilateral normal strength in wrist extension and intrinisic function.  There is pain on ight shoulder range of motion and on right wrist range of motion with pain on compression right MTP joins of hand.  Deep tendon reflexes biceps, triceps, brachioradialis,  knee jerks and ankle jerks are decreased bilaterally.      Vascular: Dorsalis pedis and posterior tibialis pulses are decreased bilaterally. There are significant telangietasias, medial ankle venous flares, venous varicosities  and spider veins .     Integumentary: Skin of the legs is uniformly warm and dry, and hyperpigmentated, with hyperkeratosis and keratoderma and with positive Stemmer's Sign . No unusual skin or temperature changes in right hand. Nails are thickened and discolored      Impression:    1. Bilateral leg swelling  2. Venous hypertension of both legs  3. History of cellulitis  4. Leg pain and claudication?  5. Complications old left CVA with R hemiplegia (mainly decreased sensation) dominant side  6. Right shoulder and wrist pain.  (rotator cuff tear complicated by old CVA)  7. Morbid Obesity  8. CHF  9. Tobacco abuse    Plan:  1. Type of swelling was reviewed in detail with the  patient.  It is multifactorial with dependent swelling, resultant venous hypertension and acquired lymphedema.  This is further worsened by CHF, morbid obesity and decreased fucntion with right hemiparesis after left CVA.  Questions were answered and education was completed.  2. Continue lymphedema therapy.  Then to [facility] for measuring appropriate velcro compression.  May need customized.  3. VIVIAN's-will schedule.  4. Contribution of tobacco use to multiple medical problems reviewed in detail and resources for quitting provided during additional 5 minute discussion.  Patient interested in utilizing recommendations.    5. Discussed importance of and how to exercise on a regular basis.  Modifications reviewed with recommendations on using the internet and cable for additional options.  She will try [employer] with sitting exercise classes.  6. Bariatric medicine consultation.  She agrees.    7. Patient will follow up in 3-4 months, or when needed.  If any questions or concerns they are to contact the clinic.     Thank you very much for referring Marlo Kearns.  If you have any questions please feel free to contact me at [phone].    Time spent with patient 60 minutes with greater than 50% time in consultation, education and coordination of care, excluding procedures.     Micki Santamaria MD, Founding Diplomate ABMARY, FACCWS, FAAPMR  Medical Director Wound Care and Lymphedema  [facility]  [phone]    This note was dictated using a voice recognition software.  Any grammatical or context distortion are unintentional and inherent to the software.

## 2021-07-03 ENCOUNTER — HEALTH MAINTENANCE LETTER (OUTPATIENT)
Age: 41
End: 2021-07-03

## 2021-07-03 NOTE — ADDENDUM NOTE
Addendum Note by Fifi Baez CMA at 10/10/2017 10:38 AM     Author: Fifi Baez CMA Service: -- Author Type: Certified Medical Assistant    Filed: 10/10/2017 10:38 AM Date of Service: 10/10/2017 10:38 AM Status: Signed    : Fifi Baez CMA (Certified Medical Assistant)    Encounter addended by: Fifi Baez CMA on: 10/10/2017 10:38 AM<BR>     Actions taken: Charge Capture section accepted

## 2021-07-03 NOTE — ADDENDUM NOTE
Addendum Note by Fifi Baez CMA at 7/12/2018 10:17 AM     Author: Fifi Baez CMA Service: -- Author Type: Certified Medical Assistant    Filed: 7/12/2018 10:17 AM Date of Service: 7/12/2018 10:17 AM Status: Signed    : Fifi Baez CMA (Certified Medical Assistant)    Encounter addended by: Fifi Baez CMA on: 7/12/2018 10:17 AM<BR>     Actions taken: Charge Capture section accepted

## 2021-07-03 NOTE — ADDENDUM NOTE
Addendum Note by Fifi Baez CMA at 8/30/2017  1:10 PM     Author: Fifi Baez CMA Service: -- Author Type: Certified Medical Assistant    Filed: 8/30/2017  1:10 PM Date of Service: 8/30/2017  1:10 PM Status: Signed    : Fifi Baez CMA (Certified Medical Assistant)    Encounter addended by: Fifi Baez CMA on: 8/30/2017  1:10 PM<BR>     Actions taken: Charge Capture section accepted

## 2021-07-14 PROBLEM — J96.90 RESPIRATORY FAILURE (H): Status: RESOLVED | Noted: 2020-02-01 | Resolved: 2020-11-06

## 2021-07-14 PROBLEM — J45.21 MILD INTERMITTENT ASTHMA WITH ACUTE EXACERBATION: Status: RESOLVED | Noted: 2020-10-08 | Resolved: 2020-11-06

## 2021-07-14 PROBLEM — I11.0: Status: RESOLVED | Noted: 2020-10-26 | Resolved: 2020-11-06

## 2021-07-14 PROBLEM — E11.9 TYPE 2 DIABETES MELLITUS WITHOUT COMPLICATION, WITHOUT LONG-TERM CURRENT USE OF INSULIN (H): Status: RESOLVED | Noted: 2020-02-02 | Resolved: 2020-11-06

## 2021-07-14 PROBLEM — J45.909 ASTHMA, UNSPECIFIED ASTHMA SEVERITY, UNSPECIFIED WHETHER COMPLICATED, UNSPECIFIED WHETHER PERSISTENT: Status: RESOLVED | Noted: 2020-10-08 | Resolved: 2020-11-06

## 2021-07-28 ENCOUNTER — VIRTUAL VISIT (OUTPATIENT)
Dept: FAMILY MEDICINE | Facility: CLINIC | Age: 41
End: 2021-07-28
Payer: MEDICAID

## 2021-07-28 DIAGNOSIS — I10 ESSENTIAL HYPERTENSION: ICD-10-CM

## 2021-07-28 DIAGNOSIS — Z71.3 NUTRITIONAL COUNSELING: ICD-10-CM

## 2021-07-28 DIAGNOSIS — E66.813 CLASS 3 OBESITY WITH ALVEOLAR HYPOVENTILATION, SERIOUS COMORBIDITY, AND BODY MASS INDEX (BMI) OF 60.0 TO 69.9 IN ADULT (H): ICD-10-CM

## 2021-07-28 DIAGNOSIS — E66.2 CLASS 3 OBESITY WITH ALVEOLAR HYPOVENTILATION, SERIOUS COMORBIDITY, AND BODY MASS INDEX (BMI) OF 60.0 TO 69.9 IN ADULT (H): ICD-10-CM

## 2021-07-28 DIAGNOSIS — E11.9 TYPE 2 DIABETES MELLITUS WITHOUT COMPLICATION, WITHOUT LONG-TERM CURRENT USE OF INSULIN (H): ICD-10-CM

## 2021-07-28 DIAGNOSIS — I50.33 ACUTE ON CHRONIC DIASTOLIC CHF (CONGESTIVE HEART FAILURE) (H): ICD-10-CM

## 2021-07-28 DIAGNOSIS — E78.5 HYPERLIPIDEMIA, UNSPECIFIED HYPERLIPIDEMIA TYPE: Primary | ICD-10-CM

## 2021-07-28 PROCEDURE — 97803 MED NUTRITION INDIV SUBSEQ: CPT | Mod: 95 | Performed by: DIETITIAN, REGISTERED

## 2021-07-28 NOTE — PROGRESS NOTES
Marlo Kearns is a 40 year old who is being evaluated via a billable telephone visit.      What phone number would you like to be contacted at? [phone]   How would you like to obtain your AVS? Mail a copy      Medical  Weight Loss Follow-Up Diet Evaluation  Assessment:  Marlo is presenting today for a follow up weight management nutrition consultation. Pt has had an initial appointment with Dr. Naqvi.   Weight loss medication: Victoza .   Highest Weight: 391 lbs   Current Weight: 379 lbs  No flowsheet data found.  BMI: There is no height or weight on file to calculate BMI.  Patient weight not recorded    Estimated RMR (Lindside-St Jeor equation):   2336 kcals x 1.3 (light active) = 3037 kcals (for weight maintenance)     Recommended Protein Intake: 60-80 grams of protein/day  Patient Active Problem List:  Patient Active Problem List   Diagnosis     Cerebral edema (H)     Cerebral artery occlusion with cerebral infarction (H)     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     Borderline personality disorder (H)     Encephalopathy     Essential hypertension     Intraventricular hemorrhage (H)     Hemiplegia (H)     Need for prophylactic measure     Physical deconditioning     Malnutrition (H)     TBI (traumatic brain injury) (H)     Unspecified episodic mood disorder     Diastolic dysfunction     Seizure disorder (H)     Wound of right foot     Foot infection     Bipolar disorder (H)     Tobacco dependence     Septicemia (H)     S/P coil embolization of cerebral aneurysm     Pulmonary hypertension (H)     PTSD (post-traumatic stress disorder)     CARMENZA (obstructive sleep apnea)     Neuropathy     Morbid obesity with BMI of 60.0-69.9, adult (H)     Class 3 obesity with alveolar hypoventilation, serious comorbidity, and body mass index (BMI) of 60.0 to 69.9 in adult (H)     Microscopic hematuria     Lung nodules     Hyperglycemia     Hyperlipidemia, unspecified hyperlipidemia type     History of  methamphetamine abuse (H)     Hemiparesis affecting right side as late effect of cerebrovascular accident (CVA) (H)     Heartburn     H/O spont intraparenchymal intracranial hemorrhage d/t cerebral AVM     Chest pain     Anxiety     Arteriovenous malformation of brain     Aphasia as late effect of cerebrovascular accident     Alcohol abuse, episodic     Adjustment disorder with depressed mood     Acute on chronic diastolic CHF (congestive heart failure) (H)     Lymphedema     Chronic pain     Hemiplegia of dominant side as late effect of cerebrovascular disease (H)     Impaired mobility     Mild persistent asthma without complication     Neuropathy of both feet     Shoulder joint pain     Diabetes: No    Progress on goals from last visit: Has been working on reduced alcohol consumption as well as regular pop, trying to incorporate more water.  Patient reports that she feels the medication has been helping with appetite control.  Patient reports that she continues to utilize the MOM's meals for lunch, still getting the low sodium option.  Patient reports that she has been snacking on occasion, however has been able to eliminate eating in the middle of the night at this point, which she feels has been a big accomplishment .     Beverages: Water  Exercise: Treadmill up to 6 minutes at a time, frequency is as tolerated.    Nutrition Diagnosis:    Overweight/Obesity (NC 3.3) related to overeating and poor lifestyle habits as evidenced by patient's subjective statements (excessive energy intake, lack of exercise) and BMI of 69.5 kg/m2.     Intervention:  1. Food and/or nutrient delivery: balanced meals, adequate protein   2. Nutrition education: portion sizes  3. Nutrition counseling: exercise regimen     Monitoring/Evaluation:    Goals:  1. Continue to work on smaller portion sizes at meals, possibly dividing the MOM's meals in 1/2 as able.   2. Continue to increase exercise (walking on the treadmill) as able/tolerated.      Patient to follow up in 1 month(s) with RD      Phone call duration: 15 minutes      Odette Camargo RD

## 2021-08-17 ENCOUNTER — LAB REQUISITION (OUTPATIENT)
Dept: LAB | Facility: CLINIC | Age: 41
End: 2021-08-17
Payer: MEDICAID

## 2021-08-17 DIAGNOSIS — E78.5 HYPERLIPIDEMIA, UNSPECIFIED: ICD-10-CM

## 2021-08-17 DIAGNOSIS — E11.9 TYPE 2 DIABETES MELLITUS WITHOUT COMPLICATIONS (H): ICD-10-CM

## 2021-08-17 DIAGNOSIS — R59.9 ENLARGED LYMPH NODES, UNSPECIFIED: ICD-10-CM

## 2021-08-17 LAB
ALBUMIN SERPL-MCNC: 3.6 G/DL (ref 3.5–5)
ALP SERPL-CCNC: 70 U/L (ref 45–120)
ALT SERPL W P-5'-P-CCNC: 12 U/L (ref 0–45)
ANION GAP SERPL CALCULATED.3IONS-SCNC: 14 MMOL/L (ref 5–18)
AST SERPL W P-5'-P-CCNC: 14 U/L (ref 0–40)
BILIRUB SERPL-MCNC: 0.6 MG/DL (ref 0–1)
BUN SERPL-MCNC: 4 MG/DL (ref 8–22)
CALCIUM SERPL-MCNC: 9.8 MG/DL (ref 8.5–10.5)
CHLORIDE BLD-SCNC: 99 MMOL/L (ref 98–107)
CHOLEST SERPL-MCNC: 198 MG/DL
CO2 SERPL-SCNC: 27 MMOL/L (ref 22–31)
CREAT SERPL-MCNC: 0.73 MG/DL (ref 0.6–1.1)
GFR SERPL CREATININE-BSD FRML MDRD: >90 ML/MIN/1.73M2
GLUCOSE BLD-MCNC: 125 MG/DL (ref 70–125)
HDLC SERPL-MCNC: 49 MG/DL
LDLC SERPL CALC-MCNC: 127 MG/DL
POTASSIUM BLD-SCNC: 4.5 MMOL/L (ref 3.5–5)
PROT SERPL-MCNC: 7.3 G/DL (ref 6–8)
SODIUM SERPL-SCNC: 140 MMOL/L (ref 136–145)
TRIGL SERPL-MCNC: 110 MG/DL
TSH SERPL DL<=0.005 MIU/L-ACNC: 1.73 UIU/ML (ref 0.3–5)

## 2021-08-17 PROCEDURE — 84443 ASSAY THYROID STIM HORMONE: CPT | Mod: ORL | Performed by: PHYSICIAN ASSISTANT

## 2021-08-17 PROCEDURE — 80061 LIPID PANEL: CPT | Mod: ORL | Performed by: PHYSICIAN ASSISTANT

## 2021-08-17 PROCEDURE — 82040 ASSAY OF SERUM ALBUMIN: CPT | Performed by: PHYSICIAN ASSISTANT

## 2021-08-17 PROCEDURE — 83036 HEMOGLOBIN GLYCOSYLATED A1C: CPT | Mod: ORL | Performed by: PHYSICIAN ASSISTANT

## 2021-08-18 LAB — HBA1C MFR BLD: 5.1 %

## 2021-08-25 ENCOUNTER — VIRTUAL VISIT (OUTPATIENT)
Dept: FAMILY MEDICINE | Facility: CLINIC | Age: 41
End: 2021-08-25
Payer: MEDICAID

## 2021-08-25 DIAGNOSIS — E66.813 CLASS 3 OBESITY WITH ALVEOLAR HYPOVENTILATION, SERIOUS COMORBIDITY, AND BODY MASS INDEX (BMI) OF 60.0 TO 69.9 IN ADULT (H): ICD-10-CM

## 2021-08-25 DIAGNOSIS — I10 ESSENTIAL HYPERTENSION: ICD-10-CM

## 2021-08-25 DIAGNOSIS — Z71.3 NUTRITIONAL COUNSELING: ICD-10-CM

## 2021-08-25 DIAGNOSIS — E66.2 CLASS 3 OBESITY WITH ALVEOLAR HYPOVENTILATION, SERIOUS COMORBIDITY, AND BODY MASS INDEX (BMI) OF 60.0 TO 69.9 IN ADULT (H): ICD-10-CM

## 2021-08-25 DIAGNOSIS — E78.5 HYPERLIPIDEMIA, UNSPECIFIED HYPERLIPIDEMIA TYPE: Primary | ICD-10-CM

## 2021-08-25 DIAGNOSIS — I50.33 ACUTE ON CHRONIC DIASTOLIC CHF (CONGESTIVE HEART FAILURE) (H): ICD-10-CM

## 2021-08-25 PROCEDURE — 99207 PR DROP WITH A PROCEDURE: CPT | Mod: 25 | Performed by: DIETITIAN, REGISTERED

## 2021-08-25 PROCEDURE — 97803 MED NUTRITION INDIV SUBSEQ: CPT | Mod: 95 | Performed by: DIETITIAN, REGISTERED

## 2021-08-25 NOTE — PROGRESS NOTES
Marlo Kearns is a 40 year old who is being evaluated via a billable telephone visit.      What phone number would you like to be contacted at? [phone]  How would you like to obtain your AVS? Mail a copy        Medical  Weight Loss Follow-Up Diet Evaluation  Assessment:  Marlo is presenting today for a follow up weight management nutrition consultation. Pt has had an initial appointment with Dr. Naqvi.   Weight loss medication: Victoza.   Highest Weight: 391 lbs   Current Weight: 365 lbs   No flowsheet data found.  BMI: There is no height or weight on file to calculate BMI.  Patient weight not recorded    Estimated RMR ([place]-St Jeor equation):   2272 kcals x 1.3 (light active) = 2954 kcals (for weight maintenance)     Recommended Protein Intake: 60-80 grams of protein/day  Patient Active Problem List:  Patient Active Problem List   Diagnosis     Cerebral edema (H)     Cerebral artery occlusion with cerebral infarction (H)     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     Borderline personality disorder (H)     Encephalopathy     Essential hypertension     Intraventricular hemorrhage (H)     Hemiplegia (H)     Need for prophylactic measure     Physical deconditioning     Malnutrition (H)     TBI (traumatic brain injury) (H)     Unspecified episodic mood disorder     Diastolic dysfunction     Seizure disorder (H)     Wound of right foot     Foot infection     Bipolar disorder (H)     Tobacco dependence     Septicemia (H)     S/P coil embolization of cerebral aneurysm     Pulmonary hypertension (H)     PTSD (post-traumatic stress disorder)     CARMENZA (obstructive sleep apnea)     Neuropathy     Morbid obesity with BMI of 60.0-69.9, adult (H)     Class 3 obesity with alveolar hypoventilation, serious comorbidity, and body mass index (BMI) of 60.0 to 69.9 in adult (H)     Microscopic hematuria     Lung nodules     Hyperglycemia     Hyperlipidemia, unspecified hyperlipidemia type     History of  methamphetamine abuse (H)     Hemiparesis affecting right side as late effect of cerebrovascular accident (CVA) (H)     Heartburn     H/O spont intraparenchymal intracranial hemorrhage d/t cerebral AVM     Chest pain     Anxiety     Arteriovenous malformation of brain     Aphasia as late effect of cerebrovascular accident     Alcohol abuse, episodic     Adjustment disorder with depressed mood     Acute on chronic diastolic CHF (congestive heart failure) (H)     Lymphedema     Chronic pain     Hemiplegia of dominant side as late effect of cerebrovascular disease (H)     Impaired mobility     Mild persistent asthma without complication     Neuropathy of both feet     Shoulder joint pain     Diabetes: No     Progress on goals from last visit: Patient continues to use the MOM's meals for lunches.  Patient reports that she has been trying to stick with healthy food choices, noting that she continues to work on reduced sodium intake.      Dietary Recall:  Breakfast: fruit and greek yogurt, PB  Lunch: MOM's meals   Dinner:chicken, vegetable  Beverages: Water  Exercise: Treadmill-however nothing recently, house cleaning (trying to be more active), walking with her sister as well (8 minutes a time, depending upon the week in terms of frequency)     Nutrition Diagnosis:    Overweight/Obesity (NC 3.3) related to overeating and poor lifestyle habits as evidenced by patient's subjective statements (excessive energy intake, lack of exercise) and BMI of 66.9 kg/m2.     Intervention:  1. Food and/or nutrient delivery: balanced meals, adequate protein   2. Nutrition education: recap regarding protein intake, sodium consumption   3. Nutrition counseling: exercise regimen     Monitoring/Evaluation:    Goals:  1. Increase exercise as tolerated, striving for at least every other day.      Patient to follow up in 1 month(s) with RD      Phone call duration: 15 minutes      Odette Camargo RD

## 2021-09-03 ENCOUNTER — LAB REQUISITION (OUTPATIENT)
Dept: LAB | Facility: CLINIC | Age: 41
End: 2021-09-03
Payer: MEDICAID

## 2021-09-03 DIAGNOSIS — J02.9 ACUTE PHARYNGITIS, UNSPECIFIED: ICD-10-CM

## 2021-09-03 PROCEDURE — 87070 CULTURE OTHR SPECIMN AEROBIC: CPT | Mod: ORL | Performed by: PHYSICIAN ASSISTANT

## 2021-09-05 LAB
BACTERIA SPEC CULT: NORMAL
GRAM STAIN RESULT: NORMAL

## 2021-09-21 ENCOUNTER — VIRTUAL VISIT (OUTPATIENT)
Dept: SURGERY | Facility: CLINIC | Age: 41
End: 2021-09-21
Payer: MEDICAID

## 2021-09-21 DIAGNOSIS — I50.33 ACUTE ON CHRONIC DIASTOLIC CHF (CONGESTIVE HEART FAILURE) (H): ICD-10-CM

## 2021-09-21 DIAGNOSIS — E78.5 HYPERLIPIDEMIA, UNSPECIFIED HYPERLIPIDEMIA TYPE: Primary | ICD-10-CM

## 2021-09-21 DIAGNOSIS — E66.2 CLASS 3 OBESITY WITH ALVEOLAR HYPOVENTILATION, SERIOUS COMORBIDITY, AND BODY MASS INDEX (BMI) OF 60.0 TO 69.9 IN ADULT (H): ICD-10-CM

## 2021-09-21 DIAGNOSIS — Z71.3 NUTRITIONAL COUNSELING: ICD-10-CM

## 2021-09-21 DIAGNOSIS — E66.813 CLASS 3 OBESITY WITH ALVEOLAR HYPOVENTILATION, SERIOUS COMORBIDITY, AND BODY MASS INDEX (BMI) OF 60.0 TO 69.9 IN ADULT (H): ICD-10-CM

## 2021-09-21 PROCEDURE — 98967 PH1 ASSMT&MGMT NQHP 11-20: CPT | Performed by: DIETITIAN, REGISTERED

## 2021-09-21 NOTE — LETTER
9/21/2021         RE: Marlo Kearns  [address]  [place] [address]        Dear Colleague,    Thank you for referring your patient, Marlo Kearns, to the [facility] [place]. Please see a copy of my visit note below.    Marlo Kearns is a 40 year old who is being evaluated via a billable telephone visit.      What phone number would you like to be contacted at? [phone]  How would you like to obtain your AVS? [facility]      Medical  Weight Loss Follow-Up Diet Evaluation  Assessment:  Marlo is presenting today for a follow up weight management nutrition consultation. Pt has had an initial appointment with Dr. Naqvi  Weight loss medication: Victoza.   Current Weight: 375 lbs   No flowsheet data found.  BMI: There is no height or weight on file to calculate BMI.  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     Borderline personality disorder (H)     Encephalopathy     Essential hypertension     Intraventricular hemorrhage (H)     Hemiplegia (H)     Need for prophylactic measure     Physical deconditioning     Malnutrition (H)     TBI (traumatic brain injury) (H)     Unspecified episodic mood disorder     Diastolic dysfunction     Seizure disorder (H)     Wound of right foot     Foot infection     Bipolar disorder (H)     Tobacco dependence     Septicemia (H)     S/P coil embolization of cerebral aneurysm     Pulmonary hypertension (H)     PTSD (post-traumatic stress disorder)     CARMENZA (obstructive sleep apnea)     Neuropathy     Morbid obesity with BMI of 60.0-69.9, adult (H)     Class 3 obesity with  alveolar hypoventilation, serious comorbidity, and body mass index (BMI) of 60.0 to 69.9 in adult (H)     Microscopic hematuria     Lung nodules     Hyperglycemia     Hyperlipidemia, unspecified hyperlipidemia type     History of methamphetamine abuse (H)     Hemiparesis affecting right side as late effect of cerebrovascular accident (CVA) (H)     Heartburn     H/O spont intraparenchymal intracranial hemorrhage d/t cerebral AVM     Chest pain     Anxiety     Arteriovenous malformation of brain     Aphasia as late effect of cerebrovascular accident     Alcohol abuse, episodic     Adjustment disorder with depressed mood     Acute on chronic diastolic CHF (congestive heart failure) (H)     Lymphedema     Chronic pain     Hemiplegia of dominant side as late effect of cerebrovascular disease (H)     Impaired mobility     Mild persistent asthma without complication     Neuropathy of both feet     Shoulder joint pain     Diabetes: No     Progress on goals from last visit: fell off track these past couple of weeks, noting that prior to that she was doing well.  Patient notes that she has been doing way too many cheat days.  Patient reports that she has been eating larger portion sizes as well as eating when she is not hungry.  Patient reports that she has been eating her MOM's meals but other food in addition to that and knows she needs to get back on track. Patient reports that she has been consuming more regular pop, alcohol, and coffee than she should and not enough water.  Patient reports that she has not been using her compression stocking recently as well, noting that her feet/legs are swollen.      Exercise: no set regimen-has not been using her treadmill and knows she needs to.     Nutrition Diagnosis:    Overweight/Obesity (NC 3.3) related to overeating and poor lifestyle habits as evidenced by patient's subjective statements (excessive energy intake, lack of exercise) and BMI of 68.7 kg/m2.    Intervention:  1. Food and/or nutrient delivery: balanced meals, adequate protein  2. Nutrition education: calorie containing beverages, getting back on track with diet and exercise  3. Nutrition counseling: exercise regimen     Monitoring/Evaluation:    Goals:  1. Start using the treadmill more frequently again.  2. Reduce consumption of calorie containing beverages (I.e. pop, alcohol, etc...), increase overall water consumption.    3. Weight self 2-3 times/week.     Patient to follow up in 2 weeks with ALBA      Phone call duration: 15 minutes      Odette Camargo RD        Again, thank you for allowing me to participate in the care of your patient.        Sincerely,        Odette Camargo RD

## 2021-09-21 NOTE — PROGRESS NOTES
Marlo Kearns is a 40 year old who is being evaluated via a billable telephone visit.      What phone number would you like to be contacted at? [phone]  How would you like to obtain your AVS? [facility]  Weight Loss Follow-Up Diet Evaluation  Assessment:  Marlo is presenting today for a follow up weight management nutrition consultation. Pt has had an initial appointment with Dr. Naqvi  Weight loss medication: Victoza.   Current Weight: 375 lbs   No flowsheet data found.  BMI: There is no height or weight on file to calculate BMI.  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     Borderline personality disorder (H)     Encephalopathy     Essential hypertension     Intraventricular hemorrhage (H)     Hemiplegia (H)     Need for prophylactic measure     Physical deconditioning     Malnutrition (H)     TBI (traumatic brain injury) (H)     Unspecified episodic mood disorder     Diastolic dysfunction     Seizure disorder (H)     Wound of right foot     Foot infection     Bipolar disorder (H)     Tobacco dependence     Septicemia (H)     S/P coil embolization of cerebral aneurysm     Pulmonary hypertension (H)     PTSD (post-traumatic stress disorder)     CARMENZA (obstructive sleep apnea)     Neuropathy     Morbid obesity with BMI of 60.0-69.9, adult (H)     Class 3 obesity with alveolar hypoventilation, serious comorbidity, and body mass index (BMI) of 60.0 to 69.9 in adult (H)     Microscopic hematuria     Lung nodules     Hyperglycemia     Hyperlipidemia, unspecified hyperlipidemia type     History of methamphetamine abuse (H)     Hemiparesis  affecting right side as late effect of cerebrovascular accident (CVA) (H)     Heartburn     H/O spont intraparenchymal intracranial hemorrhage d/t cerebral AVM     Chest pain     Anxiety     Arteriovenous malformation of brain     Aphasia as late effect of cerebrovascular accident     Alcohol abuse, episodic     Adjustment disorder with depressed mood     Acute on chronic diastolic CHF (congestive heart failure) (H)     Lymphedema     Chronic pain     Hemiplegia of dominant side as late effect of cerebrovascular disease (H)     Impaired mobility     Mild persistent asthma without complication     Neuropathy of both feet     Shoulder joint pain     Diabetes: No     Progress on goals from last visit: fell off track these past couple of weeks, noting that prior to that she was doing well.  Patient notes that she has been doing way too many cheat days.  Patient reports that she has been eating larger portion sizes as well as eating when she is not hungry.  Patient reports that she has been eating her MOM's meals but other food in addition to that and knows she needs to get back on track. Patient reports that she has been consuming more regular pop, alcohol, and coffee than she should and not enough water.  Patient reports that she has not been using her compression stocking recently as well, noting that her feet/legs are swollen.      Exercise: no set regimen-has not been using her treadmill and knows she needs to.     Nutrition Diagnosis:    Overweight/Obesity (NC 3.3) related to overeating and poor lifestyle habits as evidenced by patient's subjective statements (excessive energy intake, lack of exercise) and BMI of 68.7 kg/m2.   Intervention:  1. Food and/or nutrient delivery: balanced meals, adequate protein  2. Nutrition education: calorie containing beverages, getting back on track with diet and exercise  3. Nutrition counseling: exercise regimen     Monitoring/Evaluation:    Goals:  1. Start using the treadmill  more frequently again.  2. Reduce consumption of calorie containing beverages (I.e. pop, alcohol, etc...), increase overall water consumption.    3. Weight self 2-3 times/week.     Patient to follow up in 2 weeks with RD      Phone call duration: 15 minutes      Odette Camargo RD

## 2021-09-29 ENCOUNTER — OFFICE VISIT (OUTPATIENT)
Dept: NEUROLOGY | Facility: CLINIC | Age: 41
End: 2021-09-29
Payer: MEDICAID

## 2021-09-29 VITALS
HEART RATE: 72 BPM | DIASTOLIC BLOOD PRESSURE: 87 MMHG | WEIGHT: 293 LBS | BODY MASS INDEX: 53.92 KG/M2 | HEIGHT: 62 IN | SYSTOLIC BLOOD PRESSURE: 159 MMHG

## 2021-09-29 DIAGNOSIS — G40.209 LOCALIZATION-RELATED PARTIAL EPILEPSY WITH COMPLEX PARTIAL SEIZURES (H): ICD-10-CM

## 2021-09-29 DIAGNOSIS — M25.522 PAIN IN JOINT INVOLVING UPPER ARM, LEFT: Primary | ICD-10-CM

## 2021-09-29 PROCEDURE — 99205 OFFICE O/P NEW HI 60 MIN: CPT | Performed by: PSYCHIATRY & NEUROLOGY

## 2021-09-29 RX ORDER — TIZANIDINE 2 MG/1
2 TABLET ORAL 3 TIMES DAILY
Qty: 90 TABLET | Refills: 1 | Status: SHIPPED | OUTPATIENT
Start: 2021-09-29 | End: 2021-11-30

## 2021-09-29 RX ORDER — LAMOTRIGINE 150 MG/1
150 TABLET ORAL 2 TIMES DAILY
Qty: 180 TABLET | Refills: 1 | Status: SHIPPED | OUTPATIENT
Start: 2021-09-29 | End: 2022-03-29

## 2021-09-29 RX ORDER — CYCLOBENZAPRINE HCL 10 MG
TABLET ORAL
COMMUNITY

## 2021-09-29 RX ORDER — LEVETIRACETAM 750 MG/1
750 TABLET ORAL 2 TIMES DAILY
Qty: 180 TABLET | Refills: 3 | Status: SHIPPED | OUTPATIENT
Start: 2021-09-29 | End: 2022-10-10

## 2021-09-29 ASSESSMENT — MIFFLIN-ST. JEOR: SCORE: 2318.77

## 2021-09-29 NOTE — LETTER
9/29/2021         RE: Marlo Kearns  [address]  [place] [address]        Dear Colleague,    Thank you for referring your patient, Marlo Kearns, to the [facility] [place]. Please see a copy of my visit note below.    NEUROLOGY OUTPATIENT PROGRESS NOTE   Sep 29, 2021     CHIEF COMPLAINT/REASON FOR VISIT/REASON FOR CONSULT  Patient presents with:  Seizures: Previous pt of Dr. Chávez. Follow up seizures and CVA from 2015. Pt denies seizure activity. Pain in right arm    REASON FOR CONSULTATION- Seizures    REFERRAL SOURCE  Dr. Jacobs  CC Dr. Jacobs    HISTORY OF PRESENT ILLNESS  Marlo Kearns is a 40 year old female seen today for evaluation of seizures.  Patient was previously established with Dr. Jacobs.  Patient had her initial stroke in 2015.  She had a ruptured cerebral AVM.  She had right-sided weakness with that.  She was on the toilet and then felt dizzy and fell forward.  She then does not remember exactly what happened but was found to have the AVM and was in coma.  Did have embolization with no recurrence.  Last MRI was in 2017.    Since then she has had seizures involving some shaking of the right arm and leg followed by generalized shaking and passing out.  She does get a premonition of having a seizure.  She was initially started on Keppra and then lamotrigine was added due to incomplete control of her seizures.  She has been taking the Keppra and lamotrigine without any side effects.  EEGs have shown abnormality in the left cerebral hemisphere.    Does complain of some pain in the right arm and leg as well as the left arm and leg.  She is on Flexeril currently.  Reports that the pain is more in the joints.  Does have neuropathic pain in the feet as well.  Is working on weight loss.  Does have a diagnosis of diabetes.    Reports compliance with his seizure medications.  No seizures in the last 2-1/2 years.     She did have a generalized tonic-clonic seizure at age 18 before  she had the rupture of the AVM.    Previous history is reviewed and this is unchanged.    PAST MEDICAL/SURGICAL HISTORY  Past Medical History:   Diagnosis Date     Acute renal failure (H)      Acute renal failure (H) 07/05/2014     MEGHA (acute kidney injury) (H)      Aphasia      Asthma, unspecified asthma severity, unspecified whether complicated, unspecified whether persistent 10/08/2020     AV malformation, acquired (H)      AVM (arteriovenous malformation) brain     left, ruptured     Bipolar affective (H)      Borderline personality disorder (H)      Cellulitis 07/27/2020     Cerebral edema (H)      Elevated glucose      Encephalopathy      Encephalopathy      Headache      Heart disease 10/26/2020     Hemiparesis (H)      Hypertension      Hypertensive heart failure (H) 10/26/2020     Hypoxia 05/29/2019     Intraventricular hemorrhage (H)      Mild intermittent asthma with acute exacerbation 10/08/2020     Morbid obesity (H)      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     Pneumonia      Polysubstance abuse (H)      Polysubstance abuse (H)      PTSD (post-traumatic stress disorder)      Renal failure      Respiratory failure (H) 02/01/2020     Seizures (H)      Stroke (H)      Suicidal ideation      Type 2 diabetes mellitus without complication, without long-term current use of insulin (H) 02/02/2020     UTI (urinary tract infection)      Patient Active Problem List   Diagnosis     Cerebral edema (H)     Cerebral artery occlusion with cerebral infarction (H)     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     Borderline personality disorder (H)     Encephalopathy     Essential hypertension     Intraventricular hemorrhage (H)     Hemiplegia (H)     Need for prophylactic measure     Physical deconditioning     Malnutrition (H)     TBI (traumatic brain injury) (H)     Unspecified episodic mood disorder     Diastolic dysfunction     Seizure disorder (H)     Wound of  right foot     Foot infection     Bipolar disorder (H)     Tobacco dependence     Septicemia (H)     S/P coil embolization of cerebral aneurysm     Pulmonary hypertension (H)     PTSD (post-traumatic stress disorder)     CARMENZA (obstructive sleep apnea)     Neuropathy     Morbid obesity with BMI of 60.0-69.9, adult (H)     Class 3 obesity with alveolar hypoventilation, serious comorbidity, and body mass index (BMI) of 60.0 to 69.9 in adult (H)     Microscopic hematuria     Lung nodules     Hyperglycemia     Hyperlipidemia, unspecified hyperlipidemia type     History of methamphetamine abuse (H)     Hemiparesis affecting right side as late effect of cerebrovascular accident (CVA) (H)     Heartburn     H/O spont intraparenchymal intracranial hemorrhage d/t cerebral AVM     Chest pain     Anxiety     Arteriovenous malformation of brain     Aphasia as late effect of cerebrovascular accident     Alcohol abuse, episodic     Adjustment disorder with depressed mood     Acute on chronic diastolic CHF (congestive heart failure) (H)     Lymphedema     Chronic pain     Hemiplegia of dominant side as late effect of cerebrovascular disease (H)     Impaired mobility     Mild persistent asthma without complication     Neuropathy of both feet     Shoulder joint pain   Reviewed and otherwise noncontributory.    FAMILY HISTORY  Family History   Problem Relation Age of Onset     Heart Disease Mother      Hypertension Mother      Diabetes Mother      Arthritis Mother      Asthma Mother      Other - See Comments Mother         bleeding disorder     Obesity Mother      Mental Illness Mother      Hyperlipidemia Mother      Heart Disease Brother      Alcoholism Brother         alcohol/drug problem     Asthma Brother      Hyperlipidemia Brother      Hypertension Father      Arthritis Father      Alcoholism Father         alcohol/drug problem     Asthma Maternal Grandmother      Arthritis Maternal Grandmother      Diabetes Maternal Grandfather       Heart Disease Maternal Grandfather      Hypertension Maternal Grandfather      Kidney Disease Maternal Grandfather      Hyperlipidemia Maternal Grandfather    Family Struve seizures in sister and brother.    SOCIAL HISTORY  Social History     Tobacco Use     Smoking status: Current Every Day Smoker     Packs/day: 0.50     Smokeless tobacco: Never Used   Substance Use Topics     Alcohol use: Not Currently     Drug use: Not Currently     Types: Methamphetamines, Cocaine       SYSTEMS REVIEW  Twelve-system ROS was done and other than the HPI this was negative.  Pertinent positives noted in the HPI.    MEDICATIONS  acetaminophen (TYLENOL) 500 MG tablet, [ACETAMINOPHEN (TYLENOL) 500 MG TABLET] Take 500 mg by mouth every 6 (six) hours as needed.   albuterol (PROVENTIL) 2.5 mg /3 mL (0.083 %) nebulizer solution, [ALBUTEROL (PROVENTIL) 2.5 MG /3 ML (0.083 %) NEBULIZER SOLUTION] Take 3 mL (2.5 mg total) by nebulization every 4 (four) hours as needed for wheezing.  amLODIPine (NORVASC) 10 MG tablet, [AMLODIPINE (NORVASC) 10 MG TABLET] Take 1 tablet (10 mg total) by mouth every morning. Hold for SBP < 110  ARIPiprazole (ABILIFY) 5 MG tablet, [ARIPIPRAZOLE (ABILIFY) 5 MG TABLET] TAKE 1 TABLET (5 MG TOTAL) BY MOUTH DAILY.  bisacodyl (DULCOLAX) 10 mg suppository, [BISACODYL (DULCOLAX) 10 MG SUPPOSITORY] Daily as needed for constipation  bumetanide (BUMEX) 2 MG tablet, [BUMETANIDE (BUMEX) 2 MG TABLET] Take 2 mg by mouth 2 (two) times a day.  cyclobenzaprine (FLEXERIL) 10 MG tablet, TAKE 1 TABLET BY MOUTH EVERY NIGHT AT BEDTIME  diclofenac sodium (VOLTAREN) 1 % Gel, [DICLOFENAC SODIUM (VOLTAREN) 1 % GEL] Apply topically 2 (two) times a day.  fluticasone propion-salmeteroL (ADVAIR) 100-50 mcg/dose DISKUS, [FLUTICASONE PROPION-SALMETEROL (ADVAIR) 100-50 MCG/DOSE DISKUS] Inhale 1 puff every 12 (twelve) hours.  folic acid (FOLVITE) 1 MG tablet, [FOLIC ACID (FOLVITE) 1 MG TABLET] Take 1 tablet (1 mg total) by mouth  daily.  gabapentin (NEURONTIN) 300 MG capsule, Take 600 mg by mouth 2 times daily   gabapentin (NEURONTIN) 300 MG capsule, Take 900 mg by mouth At Bedtime   ipratropium-albuteroL (DUO-NEB) 0.5-2.5 mg/3 mL nebulizer, [IPRATROPIUM-ALBUTEROL (DUO-NEB) 0.5-2.5 MG/3 ML NEBULIZER] Inhale every 4 (four) hours.  levETIRAcetam (KEPPRA) 500 MG tablet, [LEVETIRACETAM (KEPPRA) 500 MG TABLET] Take 750 mg by mouth 2 (two) times a day.   liraglutide (VICTOZA 3-AMBIKA) 0.6 mg/0.1 mL (18 mg/3 mL) injection, [LIRAGLUTIDE (VICTOZA 3-AMBIKA) 0.6 MG/0.1 ML (18 MG/3 ML) INJECTION] Inject 0.1 mL (0.6 mg total) under the skin daily for 7 days, THEN 0.2 mL (1.2 mg total) daily for 7 days, THEN 0.3 mL (1.8 mg total) daily.  meclizine (ANTIVERT) 25 mg tablet, [MECLIZINE (ANTIVERT) 25 MG TABLET] Take 25 mg by mouth 3 (three) times a day as needed for dizziness.  methocarbamoL (ROBAXIN) 750 MG tablet, [METHOCARBAMOL (ROBAXIN) 750 MG TABLET] Take 1 tablet by mouth 2 (two) times a day.  metoprolol tartrate (LOPRESSOR) 25 MG tablet, [METOPROLOL TARTRATE (LOPRESSOR) 25 MG TABLET] Take 0.5 tablets (12.5 mg total) by mouth 2 (two) times a day. Hold for SBP < 100, HR < 60  miconazole nitrate (CRITIC-AID AF) 2 % Oint ointment, [MICONAZOLE NITRATE (CRITIC-AID AF) 2 % OINT OINTMENT] Apply to perianal rash, anterior thigh  multivitamin (ONE A DAY) per tablet, [MULTIVITAMIN (ONE A DAY) PER TABLET] Take 1 tablet by mouth daily.  multivitamin with minerals (THERA-M) 9 mg iron-400 mcg Tab tablet, [MULTIVITAMIN WITH MINERALS (THERA-M) 9 MG IRON-400 MCG TAB TABLET] Take 1 tablet by mouth daily.  OLANZapine (ZYPREXA) 2.5 MG tablet, [OLANZAPINE (ZYPREXA) 2.5 MG TABLET] TAKE 1 TABLET BY MOUTH THREE TIMES A DAY AS NEEDED FOR AGITATION  potassium chloride (K-DUR,KLOR-CON) 20 MEQ tablet, [POTASSIUM CHLORIDE (K-DUR,KLOR-CON) 20 MEQ TABLET] Take 20 mEq by mouth 2 (two) times a day with meals.  spironolactone (ALDACTONE) 25 MG tablet, [SPIRONOLACTONE (ALDACTONE) 25 MG TABLET]  Take 25 mg by mouth daily before breakfast.  venlafaxine (EFFEXOR-XR) 37.5 MG 24 hr capsule, [VENLAFAXINE (EFFEXOR-XR) 37.5 MG 24 HR CAPSULE] TAKE 1 CAPSULE (37.5 MG TOTAL) BY MOUTH DAILY.  white petrolatum (AQUAPHOR NATURAL HEALING) 41 % Oint, [WHITE PETROLATUM (AQUAPHOR NATURAL HEALING) 41 % OINT] To dry skin  collagenase ointment, [COLLAGENASE OINTMENT] Apply 1 application topically daily. To right foot (Patient not taking: Reported on 9/29/2021)  famotidine (PEPCID) 20 MG tablet, [FAMOTIDINE (PEPCID) 20 MG TABLET] Take 20 mg by mouth 2 (two) times a day.  (Patient not taking: Reported on 9/29/2021)  fish oil-omega-3 fatty acids 300-1,000 mg capsule, [FISH OIL-OMEGA-3 FATTY ACIDS 300-1,000 MG CAPSULE] Take 1 capsule by mouth 2 (two) times a day. (Patient not taking: Reported on 9/29/2021)  Rusty Therapuetic Nutrition (RUSTY) 7-7-1.5 gram PwPk, [RUSTY THERAPUETIC NUTRITION (RUSTY) 7-7-1.5 GRAM PWPK] Take 1 packet by mouth 2 (two) times a day. (Patient not taking: Reported on 9/29/2021)  liraglutide (VICTOZA 3-AMBIKA) 0.6 mg/0.1 mL (18 mg/3 mL) injection, [LIRAGLUTIDE (VICTOZA 3-AMBIKA) 0.6 MG/0.1 ML (18 MG/3 ML) INJECTION] Inject 0.1 mL (0.6 mg total) under the skin daily for 7 days, THEN 0.2 mL (1.2 mg total) daily for 7 days, THEN 0.3 mL (1.8 mg total) daily.  liraglutide (VICTOZA 3-AMBIKA) 0.6 mg/0.1 mL (18 mg/3 mL) injection, [LIRAGLUTIDE (VICTOZA 3-AMBIKA) 0.6 MG/0.1 ML (18 MG/3 ML) INJECTION] Inject 0.1 mL (0.6 mg total) under the skin daily for 7 days, THEN 0.2 mL (1.2 mg total) daily for 7 days, THEN 0.3 mL (1.8 mg total) daily.  liraglutide (VICTOZA 3-AMBIKA) 0.6 mg/0.1 mL (18 mg/3 mL) injection, [LIRAGLUTIDE (VICTOZA 3-AMBIKA) 0.6 MG/0.1 ML (18 MG/3 ML) INJECTION] Inject 0.1 mL (0.6 mg total) under the skin daily for 7 days, THEN 0.2 mL (1.2 mg total) daily for 7 days, THEN 0.3 mL (1.8 mg total) daily.  miscellaneous medical supply Misc, [MISCELLANEOUS MEDICAL SUPPLY MISC] New CPAP machine for home use at pressure: 10-20  "cmw , Heated humidifier x 1 q 5yr, water chamber x 1 q 6 mo,  chin strap x 1 q 6 mo,  Full Face Mask x 1 q 3mo, Full face cushion x 1 q mo, Heated PAP tubing x 1 q 3 mo, Headgear x 1 q 6 mo, non-disposable filters x 1 q 6 mo, disposable filter x 2 q mo; Length of Need: 99 months; Frequency of use: Daily  nicotine (NICODERM CQ) 14 mg/24 hr, [NICOTINE (NICODERM CQ) 14 MG/24 HR] Place 1 patch on the skin daily. (Patient not taking: Reported on 9/29/2021)  pen needle, diabetic (BD ULTRA-FINE HENNY PEN NEEDLE) 32 gauge x 5/32\" Ndle, [PEN NEEDLE, DIABETIC (BD ULTRA-FINE HENNY PEN NEEDLE) 32 GAUGE X 5/32\" NDLE] USE WITH LIRAGULATIDE  torsemide (DEMADEX) 20 MG tablet, [TORSEMIDE (DEMADEX) 20 MG TABLET] Take 40 mg by mouth 2 (two) times a day at 9am and 6pm. (Patient not taking: Reported on 9/29/2021)    No current facility-administered medications on file prior to visit.       PHYSICAL EXAMINATION  VITALS: BP (!) 159/87 (BP Location: Left arm, Patient Position: Sitting)   Pulse 72   Ht 1.575 m (5' 2\")   Wt (!) 170.6 kg (376 lb)   BMI 68.77 kg/m    GENERAL: Healthy appearing, alert, no acute distress, normal habitus.  CARDIOVASCULAR: Extremities warm and well perfused. Pulses present.   NEUROLOGICAL:  Patient is awake and oriented to self, place and time.  Attention span is normal.  Memory is grossly intact.  Language is fluent and follows commands appropriately.  Appropriate fund of knowledge. Cranial nerves 2-12 are intact. There is no pronator drift.  Motor exam shows 5/5 strength in all extremities tested grossly due to obesity with questionable right upper extremity weakness.  Tone is symmetric bilaterally in upper and lower extremities.  Reflexes are symmetric and 2+ in upper extremities and absent in lower extremities. Sensory exam is grossly intact to light touch, pin prick and vibration with more painful sensation on the right side/right leg.  Finger to nose and heel to shin is without dysmetria except difficulty " with moving the legs bilaterally.  Gait is antalgic due to obesity/stroke.    DIAGNOSTICS  EEG 2015 [facility]  IMPRESSION:    1. Abnormal EEG recording due to intermittent focal theta-delta slowing noted in the left temporal region. This is consistent with a structural abnormality in that region. No epileptiform activity was seen.  2. There is excessive beta activity seen. This most commonly is seen as a medication effect.    EEG 2017 [place]  Clinical Correlation:   This EEG demonstrates focal abnormalities of the left frontal   temporal region.  Focal abnormalities imply localized cortical   dysfunction and may be related to structural, vascular, or other   epileptogenic pathologies.  Clinical correlation is recommended.     MRI 2017  CONCLUSION:   1.  No acute intracranial finding. No evidence for recent infarct, hemorrhage or mass.   2.  Stable parenchymal volume loss in the left temporoparietal region, associated with changes of arteriovenous malformation embolization.      OUTSIDE RECORDS  Previous notes from Dr. Jacobs reviewed    IMPRESSION/REPORT/PLAN  Partial symptomatic epilepsy with complex partial seizures, not intractable, without status epilepticus (H)  Pain in arms/shoulders.  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  Her seizures stem from the left parietal temporal AVM rupture.  MRI in 2017 was stable.  Patient last seizure did show some abnormalities on the left side.  Would like to repeat the EEG to assess her epilepsy.  For right now she is tolerating the Lamictal and Keppra and will continue this.  We will check levels to assess therapeutic dose.  We will check blood work to look for side effects of the medications.    For the left arm/right arm pain I do not feel this is neurological in nature.  I will put her on tizanidine for right now.  She is also using Flexeril.  She does have an appointment in the pain clinic for further evaluation of this in a  month.    I can see her back after the testing.    -     lamoTRIgine (LAMICTAL) 150 MG tablet; Take 1 tablet (150 mg) by mouth 2 times daily  -     levETIRAcetam (KEPPRA) 750 MG tablet; Take 1 tablet (750 mg) by mouth 2 times daily  -     EEG Routine; Future  -     Lamotrigine Level; Future  -     Keppra (Levetiracetam) Level; Future  -     CBC with Platelets & Differential; Future  -     Comprehensive metabolic panel; Future  -     tiZANidine (ZANAFLEX) 2 MG tablet; Take 1 tablet (2 mg) by mouth 3 times daily    Return for In-Clinic Visit, After testing.    Over 61 minutes were spent coordinating the care for the patient on the day of the encounter.  This includes previsit, during visit and post visit activities as documented above.  Multiple problems addressed.  Patient new to me.  Reviewing previous Dr. Ambrocio notes.  (Activities include but not inclusive of reviewing chart, reviewing outside records, reviewing labs and imaging study results as well as the images, patient visit time including getting history and exam,  use if applicable, review of test results with the patient and coming up with a plan in a shared model, counseling patient and family, education and answering patient questions, EMR , EMR diagnosis entry and problem list management, medication reconciliation and prescription management if applicable, paperwork if applicable, printing documents and documentation of the visit activities.)        Erwin Retana MD  Neurologist  [facility]  Tel:- [phone]    This note was dictated using voice recognition software.  Any grammatical or context distortions are unintentional and inherent to the software.        Again, thank you for allowing me to participate in the care of your patient.        Sincerely,        Erwin Retana MD

## 2021-09-29 NOTE — PROGRESS NOTES
NEUROLOGY OUTPATIENT PROGRESS NOTE   Sep 29, 2021     CHIEF COMPLAINT/REASON FOR VISIT/REASON FOR CONSULT  Patient presents with:  Seizures: Previous pt of Dr. Chávez. Follow up seizures and CVA from 2015. Pt denies seizure activity. Pain in right arm    REASON FOR CONSULTATION- Seizures    REFERRAL SOURCE  Dr. Jacobs  CC Dr. Jacobs    HISTORY OF PRESENT ILLNESS  Marlo Kearns is a 40 year old female seen today for evaluation of seizures.  Patient was previously established with Dr. Jacobs.  Patient had her initial stroke in 2015.  She had a ruptured cerebral AVM.  She had right-sided weakness with that.  She was on the toilet and then felt dizzy and fell forward.  She then does not remember exactly what happened but was found to have the AVM and was in coma.  Did have embolization with no recurrence.  Last MRI was in 2017.    Since then she has had seizures involving some shaking of the right arm and leg followed by generalized shaking and passing out.  She does get a premonition of having a seizure.  She was initially started on Keppra and then lamotrigine was added due to incomplete control of her seizures.  She has been taking the Keppra and lamotrigine without any side effects.  EEGs have shown abnormality in the left cerebral hemisphere.    Does complain of some pain in the right arm and leg as well as the left arm and leg.  She is on Flexeril currently.  Reports that the pain is more in the joints.  Does have neuropathic pain in the feet as well.  Is working on weight loss.  Does have a diagnosis of diabetes.    Reports compliance with his seizure medications.  No seizures in the last 2-1/2 years.     She did have a generalized tonic-clonic seizure at age 18 before she had the rupture of the AVM.    Previous history is reviewed and this is unchanged.    PAST MEDICAL/SURGICAL HISTORY  Past Medical History:   Diagnosis Date     Acute renal failure (H)      Acute renal failure (H) 07/05/2014     MEGHA (acute kidney injury)  (H)      Aphasia      Asthma, unspecified asthma severity, unspecified whether complicated, unspecified whether persistent 10/08/2020     AV malformation, acquired (H)      AVM (arteriovenous malformation) brain     left, ruptured     Bipolar affective (H)      Borderline personality disorder (H)      Cellulitis 07/27/2020     Cerebral edema (H)      Elevated glucose      Encephalopathy      Encephalopathy      Headache      Heart disease 10/26/2020     Hemiparesis (H)      Hypertension      Hypertensive heart failure (H) 10/26/2020     Hypoxia 05/29/2019     Intraventricular hemorrhage (H)      Mild intermittent asthma with acute exacerbation 10/08/2020     Morbid obesity (H)      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     Pneumonia      Polysubstance abuse (H)      Polysubstance abuse (H)      PTSD (post-traumatic stress disorder)      Renal failure      Respiratory failure (H) 02/01/2020     Seizures (H)      Stroke (H)      Suicidal ideation      Type 2 diabetes mellitus without complication, without long-term current use of insulin (H) 02/02/2020     UTI (urinary tract infection)      Patient Active Problem List   Diagnosis     Cerebral edema (H)     Cerebral artery occlusion with cerebral infarction (H)     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     Borderline personality disorder (H)     Encephalopathy     Essential hypertension     Intraventricular hemorrhage (H)     Hemiplegia (H)     Need for prophylactic measure     Physical deconditioning     Malnutrition (H)     TBI (traumatic brain injury) (H)     Unspecified episodic mood disorder     Diastolic dysfunction     Seizure disorder (H)     Wound of right foot     Foot infection     Bipolar disorder (H)     Tobacco dependence     Septicemia (H)     S/P coil embolization of cerebral aneurysm     Pulmonary hypertension (H)     PTSD (post-traumatic stress disorder)     CARMENZA (obstructive sleep apnea)      Neuropathy     Morbid obesity with BMI of 60.0-69.9, adult (H)     Class 3 obesity with alveolar hypoventilation, serious comorbidity, and body mass index (BMI) of 60.0 to 69.9 in adult (H)     Microscopic hematuria     Lung nodules     Hyperglycemia     Hyperlipidemia, unspecified hyperlipidemia type     History of methamphetamine abuse (H)     Hemiparesis affecting right side as late effect of cerebrovascular accident (CVA) (H)     Heartburn     H/O spont intraparenchymal intracranial hemorrhage d/t cerebral AVM     Chest pain     Anxiety     Arteriovenous malformation of brain     Aphasia as late effect of cerebrovascular accident     Alcohol abuse, episodic     Adjustment disorder with depressed mood     Acute on chronic diastolic CHF (congestive heart failure) (H)     Lymphedema     Chronic pain     Hemiplegia of dominant side as late effect of cerebrovascular disease (H)     Impaired mobility     Mild persistent asthma without complication     Neuropathy of both feet     Shoulder joint pain   Reviewed and otherwise noncontributory.    FAMILY HISTORY  Family History   Problem Relation Age of Onset     Heart Disease Mother      Hypertension Mother      Diabetes Mother      Arthritis Mother      Asthma Mother      Other - See Comments Mother         bleeding disorder     Obesity Mother      Mental Illness Mother      Hyperlipidemia Mother      Heart Disease Brother      Alcoholism Brother         alcohol/drug problem     Asthma Brother      Hyperlipidemia Brother      Hypertension Father      Arthritis Father      Alcoholism Father         alcohol/drug problem     Asthma Maternal Grandmother      Arthritis Maternal Grandmother      Diabetes Maternal Grandfather      Heart Disease Maternal Grandfather      Hypertension Maternal Grandfather      Kidney Disease Maternal Grandfather      Hyperlipidemia Maternal Grandfather    Family Struve seizures in sister and brother.    SOCIAL HISTORY  Social History     Tobacco  Use     Smoking status: Current Every Day Smoker     Packs/day: 0.50     Smokeless tobacco: Never Used   Substance Use Topics     Alcohol use: Not Currently     Drug use: Not Currently     Types: Methamphetamines, Cocaine       SYSTEMS REVIEW  Twelve-system ROS was done and other than the HPI this was negative.  Pertinent positives noted in the HPI.    MEDICATIONS  acetaminophen (TYLENOL) 500 MG tablet, [ACETAMINOPHEN (TYLENOL) 500 MG TABLET] Take 500 mg by mouth every 6 (six) hours as needed.   albuterol (PROVENTIL) 2.5 mg /3 mL (0.083 %) nebulizer solution, [ALBUTEROL (PROVENTIL) 2.5 MG /3 ML (0.083 %) NEBULIZER SOLUTION] Take 3 mL (2.5 mg total) by nebulization every 4 (four) hours as needed for wheezing.  amLODIPine (NORVASC) 10 MG tablet, [AMLODIPINE (NORVASC) 10 MG TABLET] Take 1 tablet (10 mg total) by mouth every morning. Hold for SBP < 110  ARIPiprazole (ABILIFY) 5 MG tablet, [ARIPIPRAZOLE (ABILIFY) 5 MG TABLET] TAKE 1 TABLET (5 MG TOTAL) BY MOUTH DAILY.  bisacodyl (DULCOLAX) 10 mg suppository, [BISACODYL (DULCOLAX) 10 MG SUPPOSITORY] Daily as needed for constipation  bumetanide (BUMEX) 2 MG tablet, [BUMETANIDE (BUMEX) 2 MG TABLET] Take 2 mg by mouth 2 (two) times a day.  cyclobenzaprine (FLEXERIL) 10 MG tablet, TAKE 1 TABLET BY MOUTH EVERY NIGHT AT BEDTIME  diclofenac sodium (VOLTAREN) 1 % Gel, [DICLOFENAC SODIUM (VOLTAREN) 1 % GEL] Apply topically 2 (two) times a day.  fluticasone propion-salmeteroL (ADVAIR) 100-50 mcg/dose DISKUS, [FLUTICASONE PROPION-SALMETEROL (ADVAIR) 100-50 MCG/DOSE DISKUS] Inhale 1 puff every 12 (twelve) hours.  folic acid (FOLVITE) 1 MG tablet, [FOLIC ACID (FOLVITE) 1 MG TABLET] Take 1 tablet (1 mg total) by mouth daily.  gabapentin (NEURONTIN) 300 MG capsule, Take 600 mg by mouth 2 times daily   gabapentin (NEURONTIN) 300 MG capsule, Take 900 mg by mouth At Bedtime   ipratropium-albuteroL (DUO-NEB) 0.5-2.5 mg/3 mL nebulizer, [IPRATROPIUM-ALBUTEROL (DUO-NEB) 0.5-2.5 MG/3 ML  NEBULIZER] Inhale every 4 (four) hours.  levETIRAcetam (KEPPRA) 500 MG tablet, [LEVETIRACETAM (KEPPRA) 500 MG TABLET] Take 750 mg by mouth 2 (two) times a day.   liraglutide (VICTOZA 3-AMBIKA) 0.6 mg/0.1 mL (18 mg/3 mL) injection, [LIRAGLUTIDE (VICTOZA 3-AMBIKA) 0.6 MG/0.1 ML (18 MG/3 ML) INJECTION] Inject 0.1 mL (0.6 mg total) under the skin daily for 7 days, THEN 0.2 mL (1.2 mg total) daily for 7 days, THEN 0.3 mL (1.8 mg total) daily.  meclizine (ANTIVERT) 25 mg tablet, [MECLIZINE (ANTIVERT) 25 MG TABLET] Take 25 mg by mouth 3 (three) times a day as needed for dizziness.  methocarbamoL (ROBAXIN) 750 MG tablet, [METHOCARBAMOL (ROBAXIN) 750 MG TABLET] Take 1 tablet by mouth 2 (two) times a day.  metoprolol tartrate (LOPRESSOR) 25 MG tablet, [METOPROLOL TARTRATE (LOPRESSOR) 25 MG TABLET] Take 0.5 tablets (12.5 mg total) by mouth 2 (two) times a day. Hold for SBP < 100, HR < 60  miconazole nitrate (CRITIC-AID AF) 2 % Oint ointment, [MICONAZOLE NITRATE (CRITIC-AID AF) 2 % OINT OINTMENT] Apply to perianal rash, anterior thigh  multivitamin (ONE A DAY) per tablet, [MULTIVITAMIN (ONE A DAY) PER TABLET] Take 1 tablet by mouth daily.  multivitamin with minerals (THERA-M) 9 mg iron-400 mcg Tab tablet, [MULTIVITAMIN WITH MINERALS (THERA-M) 9 MG IRON-400 MCG TAB TABLET] Take 1 tablet by mouth daily.  OLANZapine (ZYPREXA) 2.5 MG tablet, [OLANZAPINE (ZYPREXA) 2.5 MG TABLET] TAKE 1 TABLET BY MOUTH THREE TIMES A DAY AS NEEDED FOR AGITATION  potassium chloride (K-DUR,KLOR-CON) 20 MEQ tablet, [POTASSIUM CHLORIDE (K-DUR,KLOR-CON) 20 MEQ TABLET] Take 20 mEq by mouth 2 (two) times a day with meals.  spironolactone (ALDACTONE) 25 MG tablet, [SPIRONOLACTONE (ALDACTONE) 25 MG TABLET] Take 25 mg by mouth daily before breakfast.  venlafaxine (EFFEXOR-XR) 37.5 MG 24 hr capsule, [VENLAFAXINE (EFFEXOR-XR) 37.5 MG 24 HR CAPSULE] TAKE 1 CAPSULE (37.5 MG TOTAL) BY MOUTH DAILY.  white petrolatum (AQUAPHOR NATURAL HEALING) 41 % Oint, [WHITE PETROLATUM  (AQUAPHOR NATURAL HEALING) 41 % OINT] To dry skin  collagenase ointment, [COLLAGENASE OINTMENT] Apply 1 application topically daily. To right foot (Patient not taking: Reported on 9/29/2021)  famotidine (PEPCID) 20 MG tablet, [FAMOTIDINE (PEPCID) 20 MG TABLET] Take 20 mg by mouth 2 (two) times a day.  (Patient not taking: Reported on 9/29/2021)  fish oil-omega-3 fatty acids 300-1,000 mg capsule, [FISH OIL-OMEGA-3 FATTY ACIDS 300-1,000 MG CAPSULE] Take 1 capsule by mouth 2 (two) times a day. (Patient not taking: Reported on 9/29/2021)  Rsuty Therapuetic Nutrition (RUSTY) 7-7-1.5 gram PwPk, [RUSTY THERAPUETIC NUTRITION (RUSTY) 7-7-1.5 GRAM PWPK] Take 1 packet by mouth 2 (two) times a day. (Patient not taking: Reported on 9/29/2021)  liraglutide (VICTOZA 3-AMBIKA) 0.6 mg/0.1 mL (18 mg/3 mL) injection, [LIRAGLUTIDE (VICTOZA 3-AMBIKA) 0.6 MG/0.1 ML (18 MG/3 ML) INJECTION] Inject 0.1 mL (0.6 mg total) under the skin daily for 7 days, THEN 0.2 mL (1.2 mg total) daily for 7 days, THEN 0.3 mL (1.8 mg total) daily.  liraglutide (VICTOZA 3-AMBIKA) 0.6 mg/0.1 mL (18 mg/3 mL) injection, [LIRAGLUTIDE (VICTOZA 3-AMBIKA) 0.6 MG/0.1 ML (18 MG/3 ML) INJECTION] Inject 0.1 mL (0.6 mg total) under the skin daily for 7 days, THEN 0.2 mL (1.2 mg total) daily for 7 days, THEN 0.3 mL (1.8 mg total) daily.  liraglutide (VICTOZA 3-AMBIKA) 0.6 mg/0.1 mL (18 mg/3 mL) injection, [LIRAGLUTIDE (VICTOZA 3-AMBIKA) 0.6 MG/0.1 ML (18 MG/3 ML) INJECTION] Inject 0.1 mL (0.6 mg total) under the skin daily for 7 days, THEN 0.2 mL (1.2 mg total) daily for 7 days, THEN 0.3 mL (1.8 mg total) daily.  miscellaneous medical supply Misc, [MISCELLANEOUS MEDICAL SUPPLY MISC] New CPAP machine for home use at pressure: 10-20 cmw , Heated humidifier x 1 q 5yr, water chamber x 1 q 6 mo,  chin strap x 1 q 6 mo,  Full Face Mask x 1 q 3mo, Full face cushion x 1 q mo, Heated PAP tubing x 1 q 3 mo, Headgear x 1 q 6 mo, non-disposable filters x 1 q 6 mo, disposable filter x 2 q mo; Length of  "Need: 99 months; Frequency of use: Daily  nicotine (NICODERM CQ) 14 mg/24 hr, [NICOTINE (NICODERM CQ) 14 MG/24 HR] Place 1 patch on the skin daily. (Patient not taking: Reported on 9/29/2021)  pen needle, diabetic (BD ULTRA-FINE HENNY PEN NEEDLE) 32 gauge x 5/32\" Ndle, [PEN NEEDLE, DIABETIC (BD ULTRA-FINE HENNY PEN NEEDLE) 32 GAUGE X 5/32\" NDLE] USE WITH LIRAGULATIDE  torsemide (DEMADEX) 20 MG tablet, [TORSEMIDE (DEMADEX) 20 MG TABLET] Take 40 mg by mouth 2 (two) times a day at 9am and 6pm. (Patient not taking: Reported on 9/29/2021)    No current facility-administered medications on file prior to visit.       PHYSICAL EXAMINATION  VITALS: BP (!) 159/87 (BP Location: Left arm, Patient Position: Sitting)   Pulse 72   Ht 1.575 m (5' 2\")   Wt (!) 170.6 kg (376 lb)   BMI 68.77 kg/m    GENERAL: Healthy appearing, alert, no acute distress, normal habitus.  CARDIOVASCULAR: Extremities warm and well perfused. Pulses present.   NEUROLOGICAL:  Patient is awake and oriented to self, place and time.  Attention span is normal.  Memory is grossly intact.  Language is fluent and follows commands appropriately.  Appropriate fund of knowledge. Cranial nerves 2-12 are intact. There is no pronator drift.  Motor exam shows 5/5 strength in all extremities tested grossly due to obesity with questionable right upper extremity weakness.  Tone is symmetric bilaterally in upper and lower extremities.  Reflexes are symmetric and 2+ in upper extremities and absent in lower extremities. Sensory exam is grossly intact to light touch, pin prick and vibration with more painful sensation on the right side/right leg.  Finger to nose and heel to shin is without dysmetria except difficulty with moving the legs bilaterally.  Gait is antalgic due to obesity/stroke.    DIAGNOSTICS  EEG 2015 [facility]  IMPRESSION:    1. Abnormal EEG recording due to intermittent focal theta-delta slowing noted in the left temporal region. This is consistent with a structural " abnormality in that region. No epileptiform activity was seen.  2. There is excessive beta activity seen. This most commonly is seen as a medication effect.    EEG 2017 [place]  Clinical Correlation:   This EEG demonstrates focal abnormalities of the left frontal   temporal region.  Focal abnormalities imply localized cortical   dysfunction and may be related to structural, vascular, or other   epileptogenic pathologies.  Clinical correlation is recommended.     MRI 2017  CONCLUSION:   1.  No acute intracranial finding. No evidence for recent infarct, hemorrhage or mass.   2.  Stable parenchymal volume loss in the left temporoparietal region, associated with changes of arteriovenous malformation embolization.      OUTSIDE RECORDS  Previous notes from Dr. Jacobs reviewed    IMPRESSION/REPORT/PLAN  Partial symptomatic epilepsy with complex partial seizures, not intractable, without status epilepticus (H)  Pain in arms/shoulders.  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  Her seizures stem from the left parietal temporal AVM rupture.  MRI in 2017 was stable.  Patient last seizure did show some abnormalities on the left side.  Would like to repeat the EEG to assess her epilepsy.  For right now she is tolerating the Lamictal and Keppra and will continue this.  We will check levels to assess therapeutic dose.  We will check blood work to look for side effects of the medications.    For the left arm/right arm pain I do not feel this is neurological in nature.  I will put her on tizanidine for right now.  She is also using Flexeril.  She does have an appointment in the pain clinic for further evaluation of this in a month.    I can see her back after the testing.    -     lamoTRIgine (LAMICTAL) 150 MG tablet; Take 1 tablet (150 mg) by mouth 2 times daily  -     levETIRAcetam (KEPPRA) 750 MG tablet; Take 1 tablet (750 mg) by mouth 2 times daily  -     EEG Routine; Future  -      Lamotrigine Level; Future  -     Keppra (Levetiracetam) Level; Future  -     CBC with Platelets & Differential; Future  -     Comprehensive metabolic panel; Future  -     tiZANidine (ZANAFLEX) 2 MG tablet; Take 1 tablet (2 mg) by mouth 3 times daily    Return for In-Clinic Visit, After testing.    Over 61 minutes were spent coordinating the care for the patient on the day of the encounter.  This includes previsit, during visit and post visit activities as documented above.  Multiple problems addressed.  Patient new to me.  Reviewing previous Dr. Ambrocio notes.  (Activities include but not inclusive of reviewing chart, reviewing outside records, reviewing labs and imaging study results as well as the images, patient visit time including getting history and exam,  use if applicable, review of test results with the patient and coming up with a plan in a shared model, counseling patient and family, education and answering patient questions, EMR , EMR diagnosis entry and problem list management, medication reconciliation and prescription management if applicable, paperwork if applicable, printing documents and documentation of the visit activities.)        Erwin Retana MD  Neurologist  [facility]rology [facility]  Tel:- [phone]    This note was dictated using voice recognition software.  Any grammatical or context distortions are unintentional and inherent to the software.

## 2021-09-29 NOTE — NURSING NOTE
Chief Complaint   Patient presents with     Seizures     Previous pt of Dr. Chávez. Follow up seizures and CVA from 2015. Pt denies seizure activity. Pain in right arm     Glenys Nieves LPN on 9/29/2021 at 8:24 AM

## 2021-10-05 ENCOUNTER — VIRTUAL VISIT (OUTPATIENT)
Dept: SURGERY | Facility: CLINIC | Age: 41
End: 2021-10-05
Payer: MEDICAID

## 2021-10-05 DIAGNOSIS — Z71.3 NUTRITIONAL COUNSELING: ICD-10-CM

## 2021-10-05 DIAGNOSIS — I50.33 ACUTE ON CHRONIC DIASTOLIC CHF (CONGESTIVE HEART FAILURE) (H): ICD-10-CM

## 2021-10-05 DIAGNOSIS — E66.2 CLASS 3 OBESITY WITH ALVEOLAR HYPOVENTILATION, SERIOUS COMORBIDITY, AND BODY MASS INDEX (BMI) OF 60.0 TO 69.9 IN ADULT (H): ICD-10-CM

## 2021-10-05 DIAGNOSIS — E66.813 CLASS 3 OBESITY WITH ALVEOLAR HYPOVENTILATION, SERIOUS COMORBIDITY, AND BODY MASS INDEX (BMI) OF 60.0 TO 69.9 IN ADULT (H): ICD-10-CM

## 2021-10-05 DIAGNOSIS — E78.5 HYPERLIPIDEMIA, UNSPECIFIED HYPERLIPIDEMIA TYPE: Primary | ICD-10-CM

## 2021-10-05 PROCEDURE — 98967 PH1 ASSMT&MGMT NQHP 11-20: CPT | Performed by: DIETITIAN, REGISTERED

## 2021-10-05 NOTE — LETTER
10/5/2021         RE: Marlo Kearns  [address]  [place] [address]        Dear Colleague,    Thank you for referring your patient, Marlo Kearns, to the [facility] [place]. Please see a copy of my visit note below.    Marlo Kearns is a 40 year old who is being evaluated via a billable telephone visit.      What phone number would you like to be contacted at? [phone]  How would you like to obtain your AVS? Mail a copy      Medical  Weight Loss Follow-Up Diet Evaluation  Assessment:  Marlo is presenting today for a follow up weight management nutrition consultation. Pt has had an initial appointment with Dr. Naqvi.   Weight loss medication: Victoza .   Current Weight: 377 lbs   No flowsheet data found.  BMI: There is no height or weight on file to calculate BMI.  Ideal body weight: 50.1 kg (110 lb 7.2 oz)  Adjusted ideal body weight: 98.3 kg (216 lb 10.7 oz)    Estimated RMR ([place]-St Jeor equation):   2327 kcals x 1.2 (sedentary) = 2792 kcals (for weight maintenance)   Recommended Protein Intake: 60-80 grams of protein/day  Patient Active Problem List:  Patient Active Problem List   Diagnosis     Cerebral edema (H)     Cerebral artery occlusion with cerebral infarction (H)     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     Borderline personality disorder (H)     Encephalopathy     Essential hypertension     Intraventricular hemorrhage (H)     Hemiplegia (H)     Need for prophylactic measure     Physical deconditioning     Malnutrition (H)     TBI (traumatic brain injury) (H)     Unspecified episodic mood disorder     Diastolic dysfunction     Seizure disorder (H)     Wound of right foot     Foot infection     Bipolar disorder (H)     Tobacco dependence     Septicemia (H)     S/P coil embolization of cerebral aneurysm     Pulmonary hypertension (H)     PTSD (post-traumatic stress disorder)     CARMENZA (obstructive sleep apnea)     Neuropathy      Morbid obesity with BMI of 60.0-69.9, adult (H)     Class 3 obesity with alveolar hypoventilation, serious comorbidity, and body mass index (BMI) of 60.0 to 69.9 in adult (H)     Microscopic hematuria     Lung nodules     Hyperglycemia     Hyperlipidemia, unspecified hyperlipidemia type     History of methamphetamine abuse (H)     Hemiparesis affecting right side as late effect of cerebrovascular accident (CVA) (H)     Heartburn     H/O spont intraparenchymal intracranial hemorrhage d/t cerebral AVM     Chest pain     Anxiety     Arteriovenous malformation of brain     Aphasia as late effect of cerebrovascular accident     Alcohol abuse, episodic     Adjustment disorder with depressed mood     Acute on chronic diastolic CHF (congestive heart failure) (H)     Lymphedema     Chronic pain     Hemiplegia of dominant side as late effect of cerebrovascular disease (H)     Impaired mobility     Mild persistent asthma without complication     Neuropathy of both feet     Shoulder joint pain     Diabetes: No     Progress on goals from last visit: Has been sticking with her MOM's meals, which are low sodium, once daily.  Patient notes that she is working on switching from candy to increased fruit and vegetables.  Patient reports that she has been avoiding chip consumption as well.  Patient reports that she has been working on reduced consumption of calorie containing beverages, noting that it is less than 2 weeks ago.  Patient reports that she has been working on increased water consumption as well, averaging around 36 oz/day.  Patient reports that she is still struggling with fluid gains, noting that she is trying to follow a Low Na Diet along with limiting her fluid consumption to 2000 mL/day.      Exercise: no set regimen-due to wound located on her heel.      Nutrition Diagnosis:    Overweight/Obesity (NC 3.3) related to overeating and poor lifestyle habits as evidenced by patient's subjective statements (excessive  energy intake, lack of exercise) and BMI of 69.1 kg/m2.      Intervention:  1. Food and/or nutrient delivery: balanced meals, adequate protein   2. Nutrition education: calorie containing beverages  3. Nutrition counseling: exercise regimen    Monitoring/Evaluation:    Goals:  1. Incorporate exercise into weekly routine, even if it is exercises in bed or chair vs walking on the treadmill.    2. Continue to work on reduction of calorie containing beverages (I.e. pop and alcohol).   3. Weigh self at least weekly.   4. Work on reduction of candy, use fruit as an alternative.     Patient to follow up in 2 week(s) with RD      Phone call duration: 15 minutes      Odette Camargo RD        Again, thank you for allowing me to participate in the care of your patient.        Sincerely,        Odette Camargo RD

## 2021-10-05 NOTE — PROGRESS NOTES
Marlo Kearns is a 40 year old who is being evaluated via a billable telephone visit.      What phone number would you like to be contacted at? [phone]  How would you like to obtain your AVS? Mail a copy      Medical  Weight Loss Follow-Up Diet Evaluation  Assessment:  Marlo is presenting today for a follow up weight management nutrition consultation. Pt has had an initial appointment with Dr. Naqvi.   Weight loss medication: Victoza .   Current Weight: 377 lbs   No flowsheet data found.  BMI: There is no height or weight on file to calculate BMI.  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     Borderline personality disorder (H)     Encephalopathy     Essential hypertension     Intraventricular hemorrhage (H)     Hemiplegia (H)     Need for prophylactic measure     Physical deconditioning     Malnutrition (H)     TBI (traumatic brain injury) (H)     Unspecified episodic mood disorder     Diastolic dysfunction     Seizure disorder (H)     Wound of right foot     Foot infection     Bipolar disorder (H)     Tobacco dependence     Septicemia (H)     S/P coil embolization of cerebral aneurysm     Pulmonary hypertension (H)     PTSD (post-traumatic stress disorder)     CARMENZA (obstructive sleep apnea)     Neuropathy     Morbid obesity with BMI of 60.0-69.9, adult (H)     Class 3 obesity with alveolar hypoventilation, serious comorbidity, and body mass index (BMI) of 60.0 to 69.9 in adult (H)     Microscopic hematuria     Lung nodules     Hyperglycemia     Hyperlipidemia, unspecified  hyperlipidemia type     History of methamphetamine abuse (H)     Hemiparesis affecting right side as late effect of cerebrovascular accident (CVA) (H)     Heartburn     H/O spont intraparenchymal intracranial hemorrhage d/t cerebral AVM     Chest pain     Anxiety     Arteriovenous malformation of brain     Aphasia as late effect of cerebrovascular accident     Alcohol abuse, episodic     Adjustment disorder with depressed mood     Acute on chronic diastolic CHF (congestive heart failure) (H)     Lymphedema     Chronic pain     Hemiplegia of dominant side as late effect of cerebrovascular disease (H)     Impaired mobility     Mild persistent asthma without complication     Neuropathy of both feet     Shoulder joint pain     Diabetes: No     Progress on goals from last visit: Has been sticking with her MOM's meals, which are low sodium, once daily.  Patient notes that she is working on switching from candy to increased fruit and vegetables.  Patient reports that she has been avoiding chip consumption as well.  Patient reports that she has been working on reduced consumption of calorie containing beverages, noting that it is less than 2 weeks ago.  Patient reports that she has been working on increased water consumption as well, averaging around 36 oz/day.  Patient reports that she is still struggling with fluid gains, noting that she is trying to follow a Low Na Diet along with limiting her fluid consumption to 2000 mL/day.      Exercise: no set regimen-due to wound located on her heel.      Nutrition Diagnosis:    Overweight/Obesity (NC 3.3) related to overeating and poor lifestyle habits as evidenced by patient's subjective statements (excessive energy intake, lack of exercise) and BMI of 69.1 kg/m2.      Intervention:  1. Food and/or nutrient delivery: balanced meals, adequate protein   2. Nutrition education: calorie containing beverages  3. Nutrition counseling: exercise  regimen    Monitoring/Evaluation:    Goals:  1. Incorporate exercise into weekly routine, even if it is exercises in bed or chair vs walking on the treadmill.    2. Continue to work on reduction of calorie containing beverages (I.e. pop and alcohol).   3. Weigh self at least weekly.   4. Work on reduction of candy, use fruit as an alternative.     Patient to follow up in 2 week(s) with RD      Phone call duration: 15 minutes      Odette Camargo RD

## 2021-10-11 ENCOUNTER — LAB REQUISITION (OUTPATIENT)
Dept: LAB | Facility: CLINIC | Age: 41
End: 2021-10-11
Payer: MEDICAID

## 2021-10-11 DIAGNOSIS — F60.3 BORDERLINE PERSONALITY DISORDER (H): ICD-10-CM

## 2021-10-11 DIAGNOSIS — R56.9 UNSPECIFIED CONVULSIONS (H): ICD-10-CM

## 2021-10-11 LAB
ALBUMIN SERPL-MCNC: 3.3 G/DL (ref 3.5–5)
ALP SERPL-CCNC: 62 U/L (ref 45–120)
ALT SERPL W P-5'-P-CCNC: <9 U/L (ref 0–45)
ANION GAP SERPL CALCULATED.3IONS-SCNC: 12 MMOL/L (ref 5–18)
AST SERPL W P-5'-P-CCNC: 9 U/L (ref 0–40)
BASOPHILS # BLD AUTO: 0.1 10E3/UL (ref 0–0.2)
BASOPHILS NFR BLD AUTO: 1 %
BILIRUB SERPL-MCNC: 0.6 MG/DL (ref 0–1)
BUN SERPL-MCNC: 6 MG/DL (ref 8–22)
CALCIUM SERPL-MCNC: 9.1 MG/DL (ref 8.5–10.5)
CHLORIDE BLD-SCNC: 97 MMOL/L (ref 98–107)
CHOLEST SERPL-MCNC: 185 MG/DL
CO2 SERPL-SCNC: 27 MMOL/L (ref 22–31)
CREAT SERPL-MCNC: 0.75 MG/DL (ref 0.6–1.1)
EOSINOPHIL # BLD AUTO: 0.2 10E3/UL (ref 0–0.7)
EOSINOPHIL NFR BLD AUTO: 2 %
ERYTHROCYTE [DISTWIDTH] IN BLOOD BY AUTOMATED COUNT: 15.6 % (ref 10–15)
GFR SERPL CREATININE-BSD FRML MDRD: >90 ML/MIN/1.73M2
GLUCOSE BLD-MCNC: 101 MG/DL (ref 70–125)
HCT VFR BLD AUTO: 45.1 % (ref 35–47)
HDLC SERPL-MCNC: 50 MG/DL
HGB BLD-MCNC: 14.1 G/DL (ref 11.7–15.7)
IMM GRANULOCYTES # BLD: 0.1 10E3/UL
IMM GRANULOCYTES NFR BLD: 1 %
LDLC SERPL CALC-MCNC: 109 MG/DL
LEVETIRACETAM (KEPPRA): 22.4 UG/ML (ref 6–46)
LYMPHOCYTES # BLD AUTO: 2 10E3/UL (ref 0.8–5.3)
LYMPHOCYTES NFR BLD AUTO: 17 %
MCH RBC QN AUTO: 29.2 PG (ref 26.5–33)
MCHC RBC AUTO-ENTMCNC: 31.3 G/DL (ref 31.5–36.5)
MCV RBC AUTO: 93 FL (ref 78–100)
MONOCYTES # BLD AUTO: 0.6 10E3/UL (ref 0–1.3)
MONOCYTES NFR BLD AUTO: 5 %
NEUTROPHILS # BLD AUTO: 8.7 10E3/UL (ref 1.6–8.3)
NEUTROPHILS NFR BLD AUTO: 74 %
NRBC # BLD AUTO: 0 10E3/UL
NRBC BLD AUTO-RTO: 0 /100
PLATELET # BLD AUTO: 224 10E3/UL (ref 150–450)
POTASSIUM BLD-SCNC: 3.8 MMOL/L (ref 3.5–5)
PROT SERPL-MCNC: 7 G/DL (ref 6–8)
RBC # BLD AUTO: 4.83 10E6/UL (ref 3.8–5.2)
SODIUM SERPL-SCNC: 136 MMOL/L (ref 136–145)
TRIGL SERPL-MCNC: 128 MG/DL
WBC # BLD AUTO: 11.6 10E3/UL (ref 4–11)

## 2021-10-11 PROCEDURE — 80177 DRUG SCRN QUAN LEVETIRACETAM: CPT | Mod: ORL | Performed by: PHYSICIAN ASSISTANT

## 2021-10-11 PROCEDURE — 80175 DRUG SCREEN QUAN LAMOTRIGINE: CPT | Mod: ORL | Performed by: PHYSICIAN ASSISTANT

## 2021-10-11 PROCEDURE — 85025 COMPLETE CBC W/AUTO DIFF WBC: CPT | Performed by: PHYSICIAN ASSISTANT

## 2021-10-11 PROCEDURE — 82040 ASSAY OF SERUM ALBUMIN: CPT | Performed by: PHYSICIAN ASSISTANT

## 2021-10-11 PROCEDURE — 82247 BILIRUBIN TOTAL: CPT | Performed by: PHYSICIAN ASSISTANT

## 2021-10-11 PROCEDURE — 80061 LIPID PANEL: CPT | Mod: ORL | Performed by: PHYSICIAN ASSISTANT

## 2021-10-13 LAB — LAMOTRIGINE SERPL-MCNC: 4.5 UG/ML

## 2021-10-19 ENCOUNTER — VIRTUAL VISIT (OUTPATIENT)
Dept: SURGERY | Facility: CLINIC | Age: 41
End: 2021-10-19
Payer: MEDICAID

## 2021-10-19 DIAGNOSIS — E66.2 CLASS 3 OBESITY WITH ALVEOLAR HYPOVENTILATION, SERIOUS COMORBIDITY, AND BODY MASS INDEX (BMI) OF 60.0 TO 69.9 IN ADULT (H): ICD-10-CM

## 2021-10-19 DIAGNOSIS — E66.813 CLASS 3 OBESITY WITH ALVEOLAR HYPOVENTILATION, SERIOUS COMORBIDITY, AND BODY MASS INDEX (BMI) OF 60.0 TO 69.9 IN ADULT (H): ICD-10-CM

## 2021-10-19 DIAGNOSIS — E78.5 HYPERLIPIDEMIA, UNSPECIFIED HYPERLIPIDEMIA TYPE: Primary | ICD-10-CM

## 2021-10-19 DIAGNOSIS — Z71.3 NUTRITIONAL COUNSELING: ICD-10-CM

## 2021-10-19 DIAGNOSIS — I50.33 ACUTE ON CHRONIC DIASTOLIC CHF (CONGESTIVE HEART FAILURE) (H): ICD-10-CM

## 2021-10-19 PROCEDURE — 98968 PH1 ASSMT&MGMT NQHP 21-30: CPT | Performed by: DIETITIAN, REGISTERED

## 2021-10-19 NOTE — LETTER
10/19/2021         RE: Marlo Kearns  [address]  [place] [address]        Dear Colleague,    Thank you for referring your patient, Marlo Kearns, to the [facility] [place]. Please see a copy of my visit note below.    Marlo Kearns is a 40 year old who is being evaluated via a billable telephone visit.      What phone number would you like to be contacted at? [phone]  How would you like to obtain your AVS? Mail a copy      Medical  Weight Loss Follow-Up Diet Evaluation  Assessment:  Marlo is presenting today for a follow up weight management nutrition consultation. Pt has had an initial appointment with Dr. Naqvi.  Weight loss medication: Victoza.   Pt's weight is 380 lbs     No flowsheet data found.  BMI: There is no height or weight on file to calculate BMI.  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     Borderline personality disorder (H)     Encephalopathy     Essential hypertension     Intraventricular hemorrhage (H)     Hemiplegia (H)     Need for prophylactic measure     Physical deconditioning     Malnutrition (H)     TBI (traumatic brain injury) (H)     Unspecified episodic mood disorder     Diastolic dysfunction     Seizure disorder (H)     Wound of right foot     Foot infection     Bipolar disorder (H)     Tobacco dependence     Septicemia (H)     S/P coil embolization of cerebral aneurysm     Pulmonary hypertension (H)     PTSD (post-traumatic stress disorder)     CARMENZA (obstructive sleep apnea)     Neuropathy      Morbid obesity with BMI of 60.0-69.9, adult (H)     Class 3 obesity with alveolar hypoventilation, serious comorbidity, and body mass index (BMI) of 60.0 to 69.9 in adult (H)     Microscopic hematuria     Lung nodules     Hyperglycemia     Hyperlipidemia, unspecified hyperlipidemia type     History of methamphetamine abuse (H)     Hemiparesis affecting right side as late effect of cerebrovascular accident (CVA) (H)     Heartburn     H/O spont intraparenchymal intracranial hemorrhage d/t cerebral AVM     Chest pain     Anxiety     Arteriovenous malformation of brain     Aphasia as late effect of cerebrovascular accident     Alcohol abuse, episodic     Adjustment disorder with depressed mood     Acute on chronic diastolic CHF (congestive heart failure) (H)     Lymphedema     Chronic pain     Hemiplegia of dominant side as late effect of cerebrovascular disease (H)     Impaired mobility     Mild persistent asthma without complication     Neuropathy of both feet     Shoulder joint pain     Diabetes: No    Progress on goals from last visit: Patient reports that she has been eating out of boredom lately, noting that she recently injured her foot and isn't able to move around.  Patient reports that she has been eating emotionally as well.  Patient reports that she continues to use the MOM's meals either at breakfast, lunch, or supper, noting that they are low sodium.  Patient reports that she continues to work on reducing consumption of calorie containing beverages, focusing on increased water consumption.      Exercise: unable due to foot injury, sit ups in bed     Nutrition Diagnosis:    Overweight/Obesity (NC 3.3) related to overeating and poor lifestyle habits as evidenced by patient's subjective statements (excessive energy intake, lack of exercise) and BMI of 69.6 kg/m2.      Intervention:  1. Food and/or nutrient delivery: balanced meals, adequate protein   2. Nutrition education: food diary, strategies to help  cope with non hunger eating habits  3. Nutrition counseling: exercise regimen     Monitoring/Evaluation:    Goals:  1. Keep a food journal to help bring about awareness of eating behaviors.  2. Try an alternative form of activity to cope with emotional/boredom related eating.   3. Consider trial of Protein Supplement as an alternative for a meal replacement once daily.     Patient to follow up in 2 week(s) with ALBA      Phone call duration: 25 minutes      Odette Camargo RD        Again, thank you for allowing me to participate in the care of your patient.        Sincerely,        Odette Camargo RD

## 2021-10-19 NOTE — PROGRESS NOTES
Marlo Kearns is a 40 year old who is being evaluated via a billable telephone visit.      What phone number would you like to be contacted at? [phone]  How would you like to obtain your AVS? Mail a copy      Medical  Weight Loss Follow-Up Diet Evaluation  Assessment:  Marlo is presenting today for a follow up weight management nutrition consultation. Pt has had an initial appointment with Dr. Naqvi.  Weight loss medication: Victoza.   Pt's weight is 380 lbs     No flowsheet data found.  BMI: There is no height or weight on file to calculate BMI.  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     Borderline personality disorder (H)     Encephalopathy     Essential hypertension     Intraventricular hemorrhage (H)     Hemiplegia (H)     Need for prophylactic measure     Physical deconditioning     Malnutrition (H)     TBI (traumatic brain injury) (H)     Unspecified episodic mood disorder     Diastolic dysfunction     Seizure disorder (H)     Wound of right foot     Foot infection     Bipolar disorder (H)     Tobacco dependence     Septicemia (H)     S/P coil embolization of cerebral aneurysm     Pulmonary hypertension (H)     PTSD (post-traumatic stress disorder)     CARMENZA (obstructive sleep apnea)     Neuropathy     Morbid obesity with BMI of 60.0-69.9, adult (H)     Class 3 obesity with alveolar hypoventilation, serious comorbidity, and body mass index (BMI) of 60.0 to 69.9 in adult (H)     Microscopic hematuria     Lung nodules     Hyperglycemia     Hyperlipidemia, unspecified  hyperlipidemia type     History of methamphetamine abuse (H)     Hemiparesis affecting right side as late effect of cerebrovascular accident (CVA) (H)     Heartburn     H/O spont intraparenchymal intracranial hemorrhage d/t cerebral AVM     Chest pain     Anxiety     Arteriovenous malformation of brain     Aphasia as late effect of cerebrovascular accident     Alcohol abuse, episodic     Adjustment disorder with depressed mood     Acute on chronic diastolic CHF (congestive heart failure) (H)     Lymphedema     Chronic pain     Hemiplegia of dominant side as late effect of cerebrovascular disease (H)     Impaired mobility     Mild persistent asthma without complication     Neuropathy of both feet     Shoulder joint pain     Diabetes: No    Progress on goals from last visit: Patient reports that she has been eating out of boredom lately, noting that she recently injured her foot and isn't able to move around.  Patient reports that she has been eating emotionally as well.  Patient reports that she continues to use the MOM's meals either at breakfast, lunch, or supper, noting that they are low sodium.  Patient reports that she continues to work on reducing consumption of calorie containing beverages, focusing on increased water consumption.      Exercise: unable due to foot injury, sit ups in bed     Nutrition Diagnosis:    Overweight/Obesity (NC 3.3) related to overeating and poor lifestyle habits as evidenced by patient's subjective statements (excessive energy intake, lack of exercise) and BMI of 69.6 kg/m2.      Intervention:  1. Food and/or nutrient delivery: balanced meals, adequate protein   2. Nutrition education: food diary, strategies to help cope with non hunger eating habits  3. Nutrition counseling: exercise regimen     Monitoring/Evaluation:    Goals:  1. Keep a food journal to help bring about awareness of eating behaviors.  2. Try an alternative form of activity to cope with emotional/boredom related  eating.   3. Consider trial of Protein Supplement as an alternative for a meal replacement once daily.     Patient to follow up in 2 week(s) with RD      Phone call duration: 25 minutes      Odette Camargo RD

## 2021-11-02 ENCOUNTER — VIRTUAL VISIT (OUTPATIENT)
Dept: SURGERY | Facility: CLINIC | Age: 41
End: 2021-11-02
Payer: MEDICAID

## 2021-11-02 DIAGNOSIS — I50.33 ACUTE ON CHRONIC DIASTOLIC CHF (CONGESTIVE HEART FAILURE) (H): ICD-10-CM

## 2021-11-02 DIAGNOSIS — Z71.3 NUTRITIONAL COUNSELING: ICD-10-CM

## 2021-11-02 DIAGNOSIS — E66.2 CLASS 3 OBESITY WITH ALVEOLAR HYPOVENTILATION, SERIOUS COMORBIDITY, AND BODY MASS INDEX (BMI) OF 60.0 TO 69.9 IN ADULT (H): ICD-10-CM

## 2021-11-02 DIAGNOSIS — E78.5 HYPERLIPIDEMIA, UNSPECIFIED HYPERLIPIDEMIA TYPE: Primary | ICD-10-CM

## 2021-11-02 DIAGNOSIS — E66.813 CLASS 3 OBESITY WITH ALVEOLAR HYPOVENTILATION, SERIOUS COMORBIDITY, AND BODY MASS INDEX (BMI) OF 60.0 TO 69.9 IN ADULT (H): ICD-10-CM

## 2021-11-02 PROCEDURE — 98967 PH1 ASSMT&MGMT NQHP 11-20: CPT | Performed by: DIETITIAN, REGISTERED

## 2021-11-02 NOTE — LETTER
11/2/2021         RE: Marlo Kearns  [address]  [place] [address]        Dear Colleague,    Thank you for referring your patient, Marlo Kearns, to the [facility] [place]. Please see a copy of my visit note below.    Marlo Kearns is a 41 year old who is being evaluated via a billable telephone visit.      What phone number would you like to be contacted at? [phone]  How would you like to obtain your AVS? Mail a copy      Medical  Weight Loss Follow-Up Diet Evaluation  Assessment:  Marlo is presenting today for a follow up weight management nutrition consultation. Pt has had an initial appointment with Dr. Naqvi.   Weight loss medication: Victoza.   Pt's weight is 375 lbs     No flowsheet data found.  BMI: There is no height or weight on file to calculate BMI.  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     Borderline personality disorder (H)     Encephalopathy     Essential hypertension     Intraventricular hemorrhage (H)     Hemiplegia (H)     Need for prophylactic measure     Physical deconditioning     Malnutrition (H)     TBI (traumatic brain injury) (H)     Unspecified episodic mood disorder     Diastolic dysfunction     Seizure disorder (H)     Wound of right foot     Foot infection     Bipolar disorder (H)     Tobacco dependence     Septicemia (H)     S/P coil embolization of cerebral aneurysm     Pulmonary hypertension (H)     PTSD (post-traumatic stress disorder)     CARMENZA (obstructive sleep apnea)     Neuropathy      Morbid obesity with BMI of 60.0-69.9, adult (H)     Class 3 obesity with alveolar hypoventilation, serious comorbidity, and body mass index (BMI) of 60.0 to 69.9 in adult (H)     Microscopic hematuria     Lung nodules     Hyperglycemia     Hyperlipidemia, unspecified hyperlipidemia type     History of methamphetamine abuse (H)     Hemiparesis affecting right side as late effect of cerebrovascular accident (CVA) (H)     Heartburn     H/O spont intraparenchymal intracranial hemorrhage d/t cerebral AVM     Chest pain     Anxiety     Arteriovenous malformation of brain     Aphasia as late effect of cerebrovascular accident     Alcohol abuse, episodic     Adjustment disorder with depressed mood     Acute on chronic diastolic CHF (congestive heart failure) (H)     Lymphedema     Chronic pain     Hemiplegia of dominant side as late effect of cerebrovascular disease (H)     Impaired mobility     Mild persistent asthma without complication     Neuropathy of both feet     Shoulder joint pain     Diabetes: No     Progress on goals from last visit: Did purchase a notebook in terms of recording her eating behaviors, will plan on starting to track this next couple of weeks.  Continues to utilize low Na MOM's meals either at breakfast, lunch, or supper.  Patient reports that she has been drinking more water these days, continue to work on reduced consumption of pop, noting that she is trying to be mindful regarding consumption.    Beverages: Water, Coffee, Diet Pop, Occasional Pop, Milk 1 cup/day   Exercise: Arm Exercises, limited due to foot injury   Nutrition Diagnosis:    Overweight/Obesity (NC 3.3) related to overeating and poor lifestyle habits as evidenced by patient's subjective statements (excessive energy intake, lack of exercise) and BMI 68.7 kg/m2.     Intervention:  1. Food and/or nutrient delivery: balanced meals, adequate protein   2. Nutrition education: food journal, protein supplement  3. Nutrition counseling:  exercise    Monitoring/Evaluation:    Goals:  1. Keep a food journal to help bring about awareness of eating behaviors.  2. Continue to work on an alternative to cope with emotional/boredom related eating.  3. Continue to incorporate increased movement/exercise as able pending foot injury.     Patient to follow up in 2 week(s) with ALBA        Phone call duration: 15 minutes      Odette Camargo RD        Again, thank you for allowing me to participate in the care of your patient.        Sincerely,        Odette Camargo RD

## 2021-11-02 NOTE — PROGRESS NOTES
Marlo Kearns is a 41 year old who is being evaluated via a billable telephone visit.      What phone number would you like to be contacted at? [phone]  How would you like to obtain your AVS? Mail a copy      Medical  Weight Loss Follow-Up Diet Evaluation  Assessment:  Marlo is presenting today for a follow up weight management nutrition consultation. Pt has had an initial appointment with Dr. Naqvi.   Weight loss medication: Victoza.   Pt's weight is 375 lbs     No flowsheet data found.  BMI: There is no height or weight on file to calculate BMI.  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     Borderline personality disorder (H)     Encephalopathy     Essential hypertension     Intraventricular hemorrhage (H)     Hemiplegia (H)     Need for prophylactic measure     Physical deconditioning     Malnutrition (H)     TBI (traumatic brain injury) (H)     Unspecified episodic mood disorder     Diastolic dysfunction     Seizure disorder (H)     Wound of right foot     Foot infection     Bipolar disorder (H)     Tobacco dependence     Septicemia (H)     S/P coil embolization of cerebral aneurysm     Pulmonary hypertension (H)     PTSD (post-traumatic stress disorder)     CARMENZA (obstructive sleep apnea)     Neuropathy     Morbid obesity with BMI of 60.0-69.9, adult (H)     Class 3 obesity with alveolar hypoventilation, serious comorbidity, and body mass index (BMI) of 60.0 to 69.9 in adult (H)     Microscopic hematuria     Lung nodules     Hyperglycemia     Hyperlipidemia, unspecified  hyperlipidemia type     History of methamphetamine abuse (H)     Hemiparesis affecting right side as late effect of cerebrovascular accident (CVA) (H)     Heartburn     H/O spont intraparenchymal intracranial hemorrhage d/t cerebral AVM     Chest pain     Anxiety     Arteriovenous malformation of brain     Aphasia as late effect of cerebrovascular accident     Alcohol abuse, episodic     Adjustment disorder with depressed mood     Acute on chronic diastolic CHF (congestive heart failure) (H)     Lymphedema     Chronic pain     Hemiplegia of dominant side as late effect of cerebrovascular disease (H)     Impaired mobility     Mild persistent asthma without complication     Neuropathy of both feet     Shoulder joint pain     Diabetes: No     Progress on goals from last visit: Did purchase a notebook in terms of recording her eating behaviors, will plan on starting to track this next couple of weeks.  Continues to utilize low Na MOM's meals either at breakfast, lunch, or supper.  Patient reports that she has been drinking more water these days, continue to work on reduced consumption of pop, noting that she is trying to be mindful regarding consumption.    Beverages: Water, Coffee, Diet Pop, Occasional Pop, Milk 1 cup/day   Exercise: Arm Exercises, limited due to foot injury   Nutrition Diagnosis:    Overweight/Obesity (NC 3.3) related to overeating and poor lifestyle habits as evidenced by patient's subjective statements (excessive energy intake, lack of exercise) and BMI 68.7 kg/m2.     Intervention:  1. Food and/or nutrient delivery: balanced meals, adequate protein   2. Nutrition education: food journal, protein supplement  3. Nutrition counseling: exercise    Monitoring/Evaluation:    Goals:  1. Keep a food journal to help bring about awareness of eating behaviors.  2. Continue to work on an alternative to cope with emotional/boredom related eating.  3. Continue to incorporate increased movement/exercise as able  pending foot injury.     Patient to follow up in 2 week(s) with RD        Phone call duration: 15 minutes      Odette Camargo RD

## 2021-11-16 ENCOUNTER — VIRTUAL VISIT (OUTPATIENT)
Dept: SURGERY | Facility: CLINIC | Age: 41
End: 2021-11-16
Payer: MEDICAID

## 2021-11-16 DIAGNOSIS — E66.813 CLASS 3 OBESITY WITH ALVEOLAR HYPOVENTILATION, SERIOUS COMORBIDITY, AND BODY MASS INDEX (BMI) OF 60.0 TO 69.9 IN ADULT (H): ICD-10-CM

## 2021-11-16 DIAGNOSIS — E66.2 CLASS 3 OBESITY WITH ALVEOLAR HYPOVENTILATION, SERIOUS COMORBIDITY, AND BODY MASS INDEX (BMI) OF 60.0 TO 69.9 IN ADULT (H): ICD-10-CM

## 2021-11-16 DIAGNOSIS — I50.33 ACUTE ON CHRONIC DIASTOLIC CHF (CONGESTIVE HEART FAILURE) (H): ICD-10-CM

## 2021-11-16 DIAGNOSIS — E78.5 HYPERLIPIDEMIA, UNSPECIFIED HYPERLIPIDEMIA TYPE: Primary | ICD-10-CM

## 2021-11-16 DIAGNOSIS — Z71.3 NUTRITIONAL COUNSELING: ICD-10-CM

## 2021-11-16 PROCEDURE — 98967 PH1 ASSMT&MGMT NQHP 11-20: CPT | Performed by: DIETITIAN, REGISTERED

## 2021-11-16 NOTE — PROGRESS NOTES
Marlo Kearns is a 41 year old who is being evaluated via a billable telephone visit.      What phone number would you like to be contacted at? [phone]  How would you like to obtain your AVS? Mail a copy      Medical  Weight Loss Follow-Up Diet Evaluation  Assessment:  Marlo is presenting today for a follow up weight management nutrition consultation. Pt has had an initial appointment with Dr. Naqvi  Weight loss medication: Victoza.   Pt's weight is 370.5 lbs   Initial weight: 375 lbs (last weight 2 weeks ago)   Weight change: down 4.5 lbs     No flowsheet data found.  BMI: There is no height or weight on file to calculate BMI.  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     Borderline personality disorder (H)     Encephalopathy     Essential hypertension     Intraventricular hemorrhage (H)     Hemiplegia (H)     Need for prophylactic measure     Physical deconditioning     Malnutrition (H)     TBI (traumatic brain injury) (H)     Unspecified episodic mood disorder     Diastolic dysfunction     Seizure disorder (H)     Wound of right foot     Foot infection     Bipolar disorder (H)     Tobacco dependence     Septicemia (H)     S/P coil embolization of cerebral aneurysm     Pulmonary hypertension (H)     PTSD (post-traumatic stress disorder)     CARMENZA (obstructive sleep apnea)     Neuropathy     Morbid obesity with BMI of 60.0-69.9, adult (H)     Class 3 obesity with alveolar hypoventilation, serious comorbidity, and body mass index (BMI) of 60.0 to 69.9 in adult (H)      Microscopic hematuria     Lung nodules     Hyperglycemia     Hyperlipidemia, unspecified hyperlipidemia type     History of methamphetamine abuse (H)     Hemiparesis affecting right side as late effect of cerebrovascular accident (CVA) (H)     Heartburn     H/O spont intraparenchymal intracranial hemorrhage d/t cerebral AVM     Chest pain     Anxiety     Arteriovenous malformation of brain     Aphasia as late effect of cerebrovascular accident     Alcohol abuse, episodic     Adjustment disorder with depressed mood     Acute on chronic diastolic CHF (congestive heart failure) (H)     Lymphedema     Chronic pain     Hemiplegia of dominant side as late effect of cerebrovascular disease (H)     Impaired mobility     Mild persistent asthma without complication     Neuropathy of both feet     Shoulder joint pain     Diabetes: No     Progress on goals from last visit: trying to be mindful regarding food choices and being aware of what she is putting in her mouth.  Has purchased a diary, however has not started to journal her intake, instead talking with a friend in regards to her eating behaviors and emotional related eating patterns.  Has been working on reduced coffee consumption.     Dietary Recall:  Breakfast: english muffin with sausage   Lunch:MOM's meals (low Na)   Dinner:MOM's meals or Beef Stroganoff (watching her portion sizes)   Typical snacks: Occasional Chocolate   Beverages: Water (working on increasing), Pop (working on reducing), Flavored Water   Exercise: Limited due to foot injury/wound  Nutrition Diagnosis:    Overweight/Obesity (NC 3.3) related to overeating and poor lifestyle habits as evidenced by patient's subjective statements (excessive energy intake, lack of exercise) and BMI of 67.9 kg/m2.     Intervention:  1. Food and/or nutrient delivery: balanced meals, adequate protein   2. Nutrition education: portion sizes, emotional eating   3. Nutrition counseling: exercise regimen        Monitoring/Evaluation:    Goals:  1. Continue to work on ways to help increase exercise as able/tolerated.   2. Focus on mindful eating/awareness, continuing to focus on making healthy food choices.   3. Continue to follow a Low Na Diet.     Patient to follow up in 2 week(s) with RD        Phone call duration: 15 minutes      Odette Camargo RD

## 2021-11-16 NOTE — LETTER
11/16/2021         RE: Marlo Kearns  [address]  [place] [address]        Dear Colleague,    Thank you for referring your patient, Malro Kearns, to the [facility] [place]. Please see a copy of my visit note below.    Marlo Kearns is a 41 year old who is being evaluated via a billable telephone visit.      What phone number would you like to be contacted at? [phone]  How would you like to obtain your AVS? Mail a copy      Medical  Weight Loss Follow-Up Diet Evaluation  Assessment:  Marlo is presenting today for a follow up weight management nutrition consultation. Pt has had an initial appointment with Dr. Naqvi  Weight loss medication: Victoza.   Pt's weight is 370.5 lbs   Initial weight: 375 lbs (last weight 2 weeks ago)   Weight change: down 4.5 lbs     No flowsheet data found.  BMI: There is no height or weight on file to calculate BMI.  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     Borderline personality disorder (H)     Encephalopathy     Essential hypertension     Intraventricular hemorrhage (H)     Hemiplegia (H)     Need for prophylactic measure     Physical deconditioning     Malnutrition (H)     TBI (traumatic brain injury) (H)     Unspecified episodic mood disorder     Diastolic dysfunction     Seizure disorder (H)     Wound of right foot     Foot infection     Bipolar disorder (H)     Tobacco dependence     Septicemia (H)     S/P coil embolization of cerebral aneurysm     Pulmonary hypertension (H)     PTSD  (post-traumatic stress disorder)     CARMENZA (obstructive sleep apnea)     Neuropathy     Morbid obesity with BMI of 60.0-69.9, adult (H)     Class 3 obesity with alveolar hypoventilation, serious comorbidity, and body mass index (BMI) of 60.0 to 69.9 in adult (H)     Microscopic hematuria     Lung nodules     Hyperglycemia     Hyperlipidemia, unspecified hyperlipidemia type     History of methamphetamine abuse (H)     Hemiparesis affecting right side as late effect of cerebrovascular accident (CVA) (H)     Heartburn     H/O spont intraparenchymal intracranial hemorrhage d/t cerebral AVM     Chest pain     Anxiety     Arteriovenous malformation of brain     Aphasia as late effect of cerebrovascular accident     Alcohol abuse, episodic     Adjustment disorder with depressed mood     Acute on chronic diastolic CHF (congestive heart failure) (H)     Lymphedema     Chronic pain     Hemiplegia of dominant side as late effect of cerebrovascular disease (H)     Impaired mobility     Mild persistent asthma without complication     Neuropathy of both feet     Shoulder joint pain     Diabetes: No     Progress on goals from last visit: trying to be mindful regarding food choices and being aware of what she is putting in her mouth.  Has purchased a diary, however has not started to journal her intake, instead talking with a friend in regards to her eating behaviors and emotional related eating patterns.  Has been working on reduced coffee consumption.     Dietary Recall:  Breakfast: english muffin with sausage   Lunch:MOM's meals (low Na)   Dinner:MOM's meals or Beef Stroganoff (watching her portion sizes)   Typical snacks: Occasional Chocolate   Beverages: Water (working on increasing), Pop (working on reducing), Flavored Water   Exercise: Limited due to foot injury/wound  Nutrition Diagnosis:    Overweight/Obesity (NC 3.3) related to overeating and poor lifestyle habits as evidenced by patient's subjective statements (excessive  energy intake, lack of exercise) and BMI of 67.9 kg/m2.     Intervention:  1. Food and/or nutrient delivery: balanced meals, adequate protein   2. Nutrition education: portion sizes, emotional eating   3. Nutrition counseling: exercise regimen       Monitoring/Evaluation:    Goals:  1. Continue to work on ways to help increase exercise as able/tolerated.   2. Focus on mindful eating/awareness, continuing to focus on making healthy food choices.   3. Continue to follow a Low Na Diet.     Patient to follow up in 2 week(s) with RD        Phone call duration: 15 minutes      Odette Camargo RD        Again, thank you for allowing me to participate in the care of your patient.        Sincerely,        Odette Camargo RD

## 2021-11-30 ENCOUNTER — VIRTUAL VISIT (OUTPATIENT)
Dept: SURGERY | Facility: CLINIC | Age: 41
End: 2021-11-30
Payer: MEDICAID

## 2021-11-30 DIAGNOSIS — M25.522 PAIN IN JOINT INVOLVING UPPER ARM, LEFT: ICD-10-CM

## 2021-11-30 DIAGNOSIS — E66.2 CLASS 3 OBESITY WITH ALVEOLAR HYPOVENTILATION, SERIOUS COMORBIDITY, AND BODY MASS INDEX (BMI) OF 60.0 TO 69.9 IN ADULT (H): ICD-10-CM

## 2021-11-30 DIAGNOSIS — I50.33 ACUTE ON CHRONIC DIASTOLIC CHF (CONGESTIVE HEART FAILURE) (H): ICD-10-CM

## 2021-11-30 DIAGNOSIS — Z71.3 NUTRITIONAL COUNSELING: ICD-10-CM

## 2021-11-30 DIAGNOSIS — E66.813 CLASS 3 OBESITY WITH ALVEOLAR HYPOVENTILATION, SERIOUS COMORBIDITY, AND BODY MASS INDEX (BMI) OF 60.0 TO 69.9 IN ADULT (H): ICD-10-CM

## 2021-11-30 DIAGNOSIS — E78.5 HYPERLIPIDEMIA, UNSPECIFIED HYPERLIPIDEMIA TYPE: Primary | ICD-10-CM

## 2021-11-30 PROCEDURE — 98967 PH1 ASSMT&MGMT NQHP 11-20: CPT | Performed by: DIETITIAN, REGISTERED

## 2021-11-30 RX ORDER — TIZANIDINE 2 MG/1
2 TABLET ORAL 3 TIMES DAILY
Qty: 90 TABLET | Refills: 0 | Status: SHIPPED | OUTPATIENT
Start: 2021-11-30 | End: 2021-12-23

## 2021-11-30 NOTE — TELEPHONE ENCOUNTER
Refill request for Tizanidine  EEG not done- sent to [facility] to contact patient to schedule and [employer] message sent to patient to schedule follow up  Medication T'd for review and signature  Dominga Garber CMA on 11/30/2021 at 2:36 PM

## 2021-11-30 NOTE — PROGRESS NOTES
Marlo Kearns is a 41 year old who is being evaluated via a billable telephone visit.      What phone number would you like to be contacted at? [phone]  How would you like to obtain your AVS? Mail a copy      Medical  Weight Loss Follow-Up Diet Evaluation  Assessment:  Marlo is presenting today for a follow up weight management nutrition consultation. Pt has had an initial appointment with Dr. Naqvi.  Weight loss medication: Victoza-however has recently ran out and will need to schedule a follow-up with Dr Naqvi prior to getting it filled.   Pt's weight is (unable to weigh self this week)   Initial weight: 370.5 lbs (last weight 2 weeks ago)     No flowsheet data found.  BMI: There is no height or weight on file to calculate BMI.  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     Borderline personality disorder (H)     Encephalopathy     Essential hypertension     Intraventricular hemorrhage (H)     Hemiplegia (H)     Need for prophylactic measure     Physical deconditioning     Malnutrition (H)     TBI (traumatic brain injury) (H)     Unspecified episodic mood disorder     Diastolic dysfunction     Seizure disorder (H)     Wound of right foot     Foot infection     Bipolar disorder (H)     Tobacco dependence     Septicemia (H)     S/P coil embolization of cerebral aneurysm     Pulmonary hypertension (H)     PTSD (post-traumatic stress disorder)     CARMENZA (obstructive sleep apnea)     Neuropathy     Morbid obesity with BMI of 60.0-69.9, adult (H)     Class 3 obesity with alveolar  hypoventilation, serious comorbidity, and body mass index (BMI) of 60.0 to 69.9 in adult (H)     Microscopic hematuria     Lung nodules     Hyperglycemia     Hyperlipidemia, unspecified hyperlipidemia type     History of methamphetamine abuse (H)     Hemiparesis affecting right side as late effect of cerebrovascular accident (CVA) (H)     Heartburn     H/O spont intraparenchymal intracranial hemorrhage d/t cerebral AVM     Chest pain     Anxiety     Arteriovenous malformation of brain     Aphasia as late effect of cerebrovascular accident     Alcohol abuse, episodic     Adjustment disorder with depressed mood     Acute on chronic diastolic CHF (congestive heart failure) (H)     Lymphedema     Chronic pain     Hemiplegia of dominant side as late effect of cerebrovascular disease (H)     Impaired mobility     Mild persistent asthma without complication     Neuropathy of both feet     Shoulder joint pain     Diabetes: No     Progress on goals from last visit: Feels that her weight has gone up due to the Thanksgiving holiday and consuming foods with excess sodium.  Patient reports that she continues to utilize the MOM's meals, noting that they are low sodium.  Patient reports that she is trying to be conscious regarding her food choices, however needs to work on more consistently.     Exercise: limited due to non healing wound on her foot, some arm exercises    Nutrition Diagnosis:    Overweight/Obesity (NC 3.3) related to overeating and poor lifestyle habits as evidenced by patient's subjective statements and BMI of 67.9 kg/m2.     Intervention:  1. Food and/or nutrient delivery: balanced meals, adequate protein   2. Nutrition education: getting back on track with healthy eating, mindful eating  3. Nutrition counseling: exercise regimen (I.e. chair exercises)     Monitoring/Evaluation:    Goals:  1. Trial of chair exercise videos for an option pertaining to exercise.   2. Focus on mindful eating/awareness, continuing  to focus on making healthy food choices.   3. Continue to follow a Low Sodium Diet.     Patient to follow up in 2 week(s) with RD        Phone call duration: 15 minutes    Odette Camargo RD

## 2021-11-30 NOTE — LETTER
11/30/2021         RE: Marlo Kearns  [address]  [place] [address]        Dear Colleague,    Thank you for referring your patient, Marlo Kearns, to the [facility] [place]. Please see a copy of my visit note below.    Marlo Kearns is a 41 year old who is being evaluated via a billable telephone visit.      What phone number would you like to be contacted at? [phone]  How would you like to obtain your AVS? Mail a copy      Medical  Weight Loss Follow-Up Diet Evaluation  Assessment:  Marlo is presenting today for a follow up weight management nutrition consultation. Pt has had an initial appointment with Dr. Naqvi.  Weight loss medication: Victoza-however has recently ran out and will need to schedule a follow-up with Dr Naqvi prior to getting it filled.   Pt's weight is (unable to weigh self this week)   Initial weight: 370.5 lbs (last weight 2 weeks ago)     No flowsheet data found.  BMI: There is no height or weight on file to calculate BMI.  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     Borderline personality disorder (H)     Encephalopathy     Essential hypertension     Intraventricular hemorrhage (H)     Hemiplegia (H)     Need for prophylactic measure     Physical deconditioning     Malnutrition (H)     TBI (traumatic brain injury) (H)     Unspecified episodic mood disorder     Diastolic dysfunction     Seizure disorder (H)     Wound of right foot     Foot infection     Bipolar disorder (H)     Tobacco  dependence     Septicemia (H)     S/P coil embolization of cerebral aneurysm     Pulmonary hypertension (H)     PTSD (post-traumatic stress disorder)     CARMENZA (obstructive sleep apnea)     Neuropathy     Morbid obesity with BMI of 60.0-69.9, adult (H)     Class 3 obesity with alveolar hypoventilation, serious comorbidity, and body mass index (BMI) of 60.0 to 69.9 in adult (H)     Microscopic hematuria     Lung nodules     Hyperglycemia     Hyperlipidemia, unspecified hyperlipidemia type     History of methamphetamine abuse (H)     Hemiparesis affecting right side as late effect of cerebrovascular accident (CVA) (H)     Heartburn     H/O spont intraparenchymal intracranial hemorrhage d/t cerebral AVM     Chest pain     Anxiety     Arteriovenous malformation of brain     Aphasia as late effect of cerebrovascular accident     Alcohol abuse, episodic     Adjustment disorder with depressed mood     Acute on chronic diastolic CHF (congestive heart failure) (H)     Lymphedema     Chronic pain     Hemiplegia of dominant side as late effect of cerebrovascular disease (H)     Impaired mobility     Mild persistent asthma without complication     Neuropathy of both feet     Shoulder joint pain     Diabetes: No     Progress on goals from last visit: Feels that her weight has gone up due to the Thanksgiving holiday and consuming foods with excess sodium.  Patient reports that she continues to utilize the MOM's meals, noting that they are low sodium.  Patient reports that she is trying to be conscious regarding her food choices, however needs to work on more consistently.     Exercise: limited due to non healing wound on her foot, some arm exercises    Nutrition Diagnosis:    Overweight/Obesity (NC 3.3) related to overeating and poor lifestyle habits as evidenced by patient's subjective statements and BMI of 67.9 kg/m2.     Intervention:  1. Food and/or nutrient delivery: balanced meals, adequate protein   2. Nutrition education:  getting back on track with healthy eating, mindful eating  3. Nutrition counseling: exercise regimen (I.e. chair exercises)     Monitoring/Evaluation:    Goals:  1. Trial of chair exercise videos for an option pertaining to exercise.   2. Focus on mindful eating/awareness, continuing to focus on making healthy food choices.   3. Continue to follow a Low Sodium Diet.     Patient to follow up in 2 week(s) with RD        Phone call duration: 15 minutes    Odette Camargo RD        Again, thank you for allowing me to participate in the care of your patient.        Sincerely,        Odette Camargo RD

## 2021-12-03 ENCOUNTER — TELEPHONE (OUTPATIENT)
Dept: NEUROLOGY | Facility: CLINIC | Age: 41
End: 2021-12-03
Payer: MEDICAID

## 2021-12-03 ENCOUNTER — LAB REQUISITION (OUTPATIENT)
Dept: LAB | Facility: CLINIC | Age: 41
End: 2021-12-03
Payer: MEDICAID

## 2021-12-03 DIAGNOSIS — J02.9 ACUTE PHARYNGITIS, UNSPECIFIED: ICD-10-CM

## 2021-12-03 DIAGNOSIS — R68.83 CHILLS (WITHOUT FEVER): ICD-10-CM

## 2021-12-03 PROCEDURE — 87081 CULTURE SCREEN ONLY: CPT | Performed by: PHYSICIAN ASSISTANT

## 2021-12-03 PROCEDURE — U0005 INFEC AGEN DETEC AMPLI PROBE: HCPCS | Mod: ORL | Performed by: PHYSICIAN ASSISTANT

## 2021-12-03 NOTE — TELEPHONE ENCOUNTER
[facility] pharmacist called wondering if  is aware that  patient has duplicate Rx for muscle relaxer on file, tiZANidine (ZANAFLEX , methocarbamoL (ROBAXIN) and cyclobenzaprine (FLEXERIL) . If theres any changes that needs to be made please reach out to patient. Or if you have any question p# to pharmacy is [phone]

## 2021-12-04 LAB — SARS-COV-2 RNA RESP QL NAA+PROBE: NEGATIVE

## 2021-12-06 LAB — BACTERIA SPEC CULT: NORMAL

## 2021-12-10 ENCOUNTER — VIRTUAL VISIT (OUTPATIENT)
Dept: FAMILY MEDICINE | Facility: CLINIC | Age: 41
End: 2021-12-10
Payer: MEDICAID

## 2021-12-10 DIAGNOSIS — E66.813 CLASS 3 OBESITY WITH ALVEOLAR HYPOVENTILATION, SERIOUS COMORBIDITY, AND BODY MASS INDEX (BMI) OF 60.0 TO 69.9 IN ADULT (H): ICD-10-CM

## 2021-12-10 DIAGNOSIS — G47.33 OSA (OBSTRUCTIVE SLEEP APNEA): ICD-10-CM

## 2021-12-10 DIAGNOSIS — E66.01 MORBID OBESITY WITH BMI OF 60.0-69.9, ADULT (H): Primary | ICD-10-CM

## 2021-12-10 DIAGNOSIS — E66.2 CLASS 3 OBESITY WITH ALVEOLAR HYPOVENTILATION, SERIOUS COMORBIDITY, AND BODY MASS INDEX (BMI) OF 60.0 TO 69.9 IN ADULT (H): ICD-10-CM

## 2021-12-10 DIAGNOSIS — R73.9 HYPERGLYCEMIA: ICD-10-CM

## 2021-12-10 PROBLEM — G47.34 NOCTURNAL HYPOXEMIA: Status: ACTIVE | Noted: 2019-05-29

## 2021-12-10 PROBLEM — Z86.73 HISTORY OF CVA (CEREBROVASCULAR ACCIDENT): Status: ACTIVE | Noted: 2017-11-20

## 2021-12-10 PROBLEM — J96.90 RESPIRATORY FAILURE (H): Status: ACTIVE | Noted: 2020-02-01

## 2021-12-10 PROBLEM — E11.9 TYPE 2 DIABETES MELLITUS WITHOUT COMPLICATION, WITHOUT LONG-TERM CURRENT USE OF INSULIN (H): Status: ACTIVE | Noted: 2020-02-02

## 2021-12-10 PROCEDURE — 99214 OFFICE O/P EST MOD 30 MIN: CPT | Mod: 95 | Performed by: FAMILY MEDICINE

## 2021-12-10 RX ORDER — LIRAGLUTIDE 6 MG/ML
1.8 INJECTION SUBCUTANEOUS DAILY
Qty: 3 ML | Refills: 0 | Status: SHIPPED | OUTPATIENT
Start: 2021-12-10

## 2021-12-10 NOTE — PROGRESS NOTES
Marlo is a 41 year old who is being evaluated via a billable video visit.      How would you like to obtain your AVS? MyChart  If the video visit is dropped, the invitation should be resent by: Text to cell phone: [phone]  56  Will anyone else be joining your video visit? No    Video Start Time: 12:41 PM    Assessment & Plan   Problem List Items Addressed This Visit        Respiratory    CARMENZA (obstructive sleep apnea)     Importance of using cpap reiterated.          Obesity hypoventilation syndrome (H)     WORSENING  Initial Body mass index is 68.41 kg/m  whom I have not seen in 1 year. But she has been taking victoza and feels it is helpful.      Patient declined 24 week weight loss program due to cost  Patient declined  3 pack health coaching due to cost.   We did not discuss food supplements.      We discussed surgical weight loss options given her BMI is 68 and she  has comorbid conditions: multiple see problem list.       Medications started today: victoza     Current goal(s): follow up with me every 1-2 months.      Lab assessment plan: declined labs due to immobility      Follow up monthly with either me or dietician.      DISCUSSION _________________________________________________________________     Dx:  Initial bmi is Body mass index is 68.41 kg/m .  With the following weight related comorbid medical conditions: Hypertension, CARMENZA, Asthma, hyperlipidemia, pulmonary hypertension, hx of CVA, hx of CHF,      BECAUSE OF ABOVE PROBLEMS IT IS STRONGLY ADVISED THAT Marlo LOSE WEIGHT.  BELOW IS MY PLAN FOR her      WalkerKia PA-C  is her primary care provider - who referred her here today for help with weight loss.     Start weight 375 lbs, Body mass index is 68.41 kg/m .  11/13/2020   Weight 382 lbs, 12/10/2021     Medication notes:   Medications she takes, that are known to increase weight: zyprexa, metoprolol, effexor, abilify, robaxin,   Medications she takes, known to decrease weight: none, but  "starting liraglutide  Medications she takes whose dose may be affected by weight changes: lamictal, keppra, bumex, metoprolol, psych meds     wellbutrin and phentermine contraindicated due to history of seizure disorder.      victoza  11/2020 - 12/10/2021       but if not covered would like to try naltrexone.         Potential barriers to weight loss identified thus far:   -does not drink water  -she has been to the food shelf this year.   -craves sweets, candy, chocolate  -Sleep apnea  -Chronic pain (knee/ back) potentially reducing ability to be active-physical therapy could be offered  - fatigue making it difficult to be active  -genetic component  - eats wrong types of foods  -sedentary lifestyle  -snacks between meals daily  -eats starches daily  -eats while watching TV daily        Strengths that may help weight loss:  -likes veggies  -lives with her dad and her boyfriend who are supportive of her health goals         Relevant Medications    liraglutide (VICTOZA) 18 MG/3ML solution       Digestive    Morbid obesity with BMI of 60.0-69.9, adult (H) - Primary    Relevant Medications    liraglutide (VICTOZA) 18 MG/3ML solution       Endocrine    Hyperglycemia    Relevant Medications    liraglutide (VICTOZA) 18 MG/3ML solution    Other Relevant Orders    Hemoglobin A1c            Tobacco Cessation:   reports that she has been smoking. She has been smoking about 0.50 packs per day. She has never used smokeless tobacco.  Tobacco Cessation Action Plan: cessation has been recommended.     BMI:   Estimated body mass index is 68.77 kg/m  as calculated from the following:    Height as of 9/29/21: 1.575 m (5' 2\").    Weight as of 9/29/21: 170.6 kg (376 lb).     Return in about 4 weeks (around 1/7/2022) for weight loss.    Joselyn Naqvi MD  [facility]        Keshia Sellers is a 41 year old who presents for the following health issues     She says she is dealing with a cold, ear infections " and laryngitis.     Still struggling with weight. Has been out of her victoza x 2 months and gaining weight so wants refill.           Objective    Vitals - Patient Reported  Systolic (Patient Reported): (!) 142  Diastolic (Patient Reported): 84  Blood pressure taken with manual cuff (will exclude from quality measure): Yes  Weight (Patient Reported): 173.3 kg (382 lb)        Physical Exam   GENERAL: Healthy, alert and no distress  EYES: Eyes grossly normal to inspection.  No discharge or erythema, or obvious scleral/conjunctival abnormalities.  RESP: No audible wheeze, cough, or visible cyanosis.  No visible retractions or increased work of breathing.    SKIN: Visible skin clear. No significant rash, abnormal pigmentation or lesions.  NEURO: Cranial nerves grossly intact.  Mentation and speech appropriate for age.  PSYCH: Mentation appears normal, affect normal/bright, judgement and insight intact, normal speech and appearance well-groomed.          Video-Visit Details    Type of service:  Video Visit    Video End Time:1:02 PM    Originating Location (pt. Location): Home    Distant Location (provider location):  [facility]     Platform used for Video Visit: [employer]

## 2021-12-10 NOTE — ASSESSMENT & PLAN NOTE
WORSENING  Initial Body mass index is 68.41 kg/m  whom I have not seen in 1 year. But she has been taking victoza and feels it is helpful.      Patient declined 24 week weight loss program due to cost  Patient declined  3 pack health coaching due to cost.   We did not discuss food supplements.      We discussed surgical weight loss options given her BMI is 68 and she  has comorbid conditions: multiple see problem list.       Medications started today: victoza     Current goal(s): follow up with me every 1-2 months.      Lab assessment plan: declined labs due to immobility      Follow up monthly with either me or dietician.      DISCUSSION _________________________________________________________________     Dx:  Initial bmi is Body mass index is 68.41 kg/m .  With the following weight related comorbid medical conditions: Hypertension, CARMENZA, Asthma, hyperlipidemia, pulmonary hypertension, hx of CVA, hx of CHF,      BECAUSE OF ABOVE PROBLEMS IT IS STRONGLY ADVISED THAT Marlo LOSE WEIGHT.  BELOW IS MY PLAN FOR her      WalkerKia PA-C  is her primary care provider - who referred her here today for help with weight loss.     Start weight 375 lbs, Body mass index is 68.41 kg/m .  11/13/2020   Weight 382 lbs, 12/10/2021     Medication notes:   Medications she takes, that are known to increase weight: zyprexa, metoprolol, effexor, abilify, robaxin,   Medications she takes, known to decrease weight: none, but starting liraglutide  Medications she takes whose dose may be affected by weight changes: lamictal, keppra, bumex, metoprolol, psych meds     wellbutrin and phentermine contraindicated due to history of seizure disorder.      victoza  11/2020 - 12/10/2021       but if not covered would like to try naltrexone.         Potential barriers to weight loss identified thus far:   -does not drink water  -she has been to the food shelf this year.   -craves sweets, candy, chocolate  -Sleep apnea  -Chronic pain (knee/  back) potentially reducing ability to be active-physical therapy could be offered  - fatigue making it difficult to be active  -genetic component  - eats wrong types of foods  -sedentary lifestyle  -snacks between meals daily  -eats starches daily  -eats while watching TV daily        Strengths that may help weight loss:  -likes veggies  -lives with her dad and her boyfriend who are supportive of her health goals

## 2021-12-10 NOTE — PATIENT INSTRUCTIONS
Patient Education     Semaglutide Pen Injector 2 mg/1.5 mL  Uses  For diabetes.  Instructions  This medicine will be given to you at home.  This medicine is used by injecting it into the skin. Please ask your doctor, nurse or pharmacist for the correct places on your body where this medicine can be injected.  Read and make sure you understand the instructions for measuring your dose and using the syringe before using this medicine.  Always inspect the medicine before using.  The liquid should be clear and colorless.  Do not use the medicine if it contains any particles or if it has changed color.  Store new medicine in the refrigerator until you are ready to use it. Do not allow them to freeze.  If the medicine becomes frozen, you will need to throw it away.  Protect medicine from light.  Once opened, the medicine may be kept in the refrigerator or at room temperature.  Do not store the medicine pen with the needle attached. Always remove the needle after each use.  Discard the injectable pen 56 days after first use, even if there is medicine left in the pen.  Never use any medicine that has .  Please ask your doctor, nurse, or pharmacist how to discard unused medicines safely.  Ask your doctor, nurse or pharmacist to show you how to use this medicine correctly.  You or a family member can be trained to give this medicine at home.  Change the location of the injection each time. Choose a location at least 1 inch from the last injection.  It may take several weeks for this medicine to fully work.  It is important that you keep taking each dose of this medicine on time even if you are feeling well.  If you forget to use a dose on time, use it as soon as you remember. If it has been more than 5 days, skip the missed dose and use your next dose as scheduled. Do not use 2 doses of this medicine at one time.  Please tell your doctor and pharmacist about all the medicines you take. Include both prescription and  over-the-counter medicines. Also tell them about any vitamins, herbal medicines, or anything else you take for your health.  Be sure to follow your regular meal plan and exercise as discussed with your doctor.  If your symptoms do not improve or they worsen while on this medicine, contact your doctor.  This medicine may cause low blood sugar. It is very important to have regular meals and exercise regularly as instructed by your doctor. Notify your doctor if you experience symptoms of low blood sugar.  Symptoms of low blood sugar may include nausea, shaking, sweating, cold skin, fast heartbeat, hunger, and irritability.  Do not suddenly stop taking this medicine. Check with your doctor before stopping.  It is very important that you keep all appointments for medical exams and tests while on this medicine.  Cautions  Tell your doctor and pharmacist if you ever had an allergic reaction to a medicine. Symptoms of an allergic reaction can include trouble breathing, skin rash, itching, swelling, or severe dizziness.  This medicine is associated with a rare but very serious medical condition. Please speak with your doctor about symptoms you should look out for while on this medicine. Notify your doctor immediately if you develop those symptoms.  Some patients taking this medicine have experienced serious side effects. Please speak with your doctor to understand the risks and benefits associated with this medicine.  Patients with very low blood sugar may become very confused, lose consciousness, or have seizures.  Monitor your blood sugar as instructed by your doctor.  Do not use the medication any more than instructed.  Please check with your doctor before drinking alcohol while on this medicine.  Contact your doctor if you notice a change in the amount or darkening of your urine.  Tell the doctor or pharmacist if you are pregnant, planning to be pregnant, or breastfeeding.  Ask your pharmacist if this medicine can  interact with any of your other medicines. Be sure to tell them about all the medicines you take.  Always carry an ID card or wear a medical alert bracelet showing that you are diabetic.  Carry glucose tablets or hard candy with you in case you experience low blood sugar from this medicine.  Please tell all your doctors and dentists that you are on this medicine before they provide care.  Do not start or stop any other medicines without first speaking to your doctor or pharmacist.  Used needles and syringes should be thrown away properly in a medical waste container. Ask your doctor or pharmacist if you need help.  Do not share this medicine with anyone who has not been prescribed this medicine.  This medicine can cause serious side effects in some patients. Important information from the [place] Food and Drug Administration (FDA) is available from your pharmacist. Please review it carefully with your pharmacist to understand the risks associated with this medicine.  Side Effects  The following is a list of some common side effects from this medicine. Please speak with your doctor about what you should do if you experience these or other side effects.    constipation    diarrhea    reaction at the area of the injection (pain, redness, swelling)    nausea    vomiting  Call your doctor or get medical help right away if you notice any of these more serious side effects:    dizziness    swelling in the neck or throat    fast or irregular heart beats    unusual or long-lasting hoarseness    signs of kidney damage (such as bloody or bubbly urine, or changes in urine color or amount)    unusual growth or lump on the neck    feeling of numbness or tingling in your hands and feet    inflammation of the pancreas    shakiness    shortness of breath    severe stomach pain that spreads to the back    difficulty swallowing    sweating    blurring or changes of vision    severe or persistent vomiting  A few people may have an  allergic reactions to this medicine. Symptoms can include difficulty breathing, skin rash, itching, swelling, or severe dizziness. If you notice any of these symptoms, seek medical help quickly.  Extra  Please speak with your doctor, nurse, or pharmacist if you have any questions about this medicine.  [email or web address]  IMPORTANT NOTE: This document tells you briefly how to take your medicine, but it does not tell you all there is to know about it.Your doctor or pharmacist may give you other documents about your medicine. Please talk to them if you have any questions.Always follow their advice. There is a more complete description of this medicine available in English.Scan this code on your smartphone or tablet or use the web address below. You can also ask your pharmacist for a printout. If you have any questions, please ask your pharmacist.     2021 [employer].

## 2021-12-14 ENCOUNTER — VIRTUAL VISIT (OUTPATIENT)
Dept: SURGERY | Facility: CLINIC | Age: 41
End: 2021-12-14
Payer: MEDICAID

## 2021-12-14 DIAGNOSIS — I50.33 ACUTE ON CHRONIC DIASTOLIC CHF (CONGESTIVE HEART FAILURE) (H): ICD-10-CM

## 2021-12-14 DIAGNOSIS — E66.2 CLASS 3 OBESITY WITH ALVEOLAR HYPOVENTILATION, SERIOUS COMORBIDITY, AND BODY MASS INDEX (BMI) OF 60.0 TO 69.9 IN ADULT (H): ICD-10-CM

## 2021-12-14 DIAGNOSIS — E78.5 HYPERLIPIDEMIA, UNSPECIFIED HYPERLIPIDEMIA TYPE: Primary | ICD-10-CM

## 2021-12-14 DIAGNOSIS — Z71.3 NUTRITIONAL COUNSELING: ICD-10-CM

## 2021-12-14 DIAGNOSIS — E66.813 CLASS 3 OBESITY WITH ALVEOLAR HYPOVENTILATION, SERIOUS COMORBIDITY, AND BODY MASS INDEX (BMI) OF 60.0 TO 69.9 IN ADULT (H): ICD-10-CM

## 2021-12-14 PROCEDURE — 98967 PH1 ASSMT&MGMT NQHP 11-20: CPT | Performed by: DIETITIAN, REGISTERED

## 2021-12-14 NOTE — LETTER
12/14/2021         RE: Marlo Kearns  [address]  [place] [address]        Dear Colleague,    Thank you for referring your patient, Marlo Kearns, to the [facility] [place]. Please see a copy of my visit note below.    Marlo Kearns is a 41 year old who is being evaluated via a billable telephone visit.      What phone number would you like to be contacted at? [phone]  How would you like to obtain your AVS? Mail a copy      Medical  Weight Loss Follow-Up Diet Evaluation  Assessment:  Marlo is presenting today for a follow up weight management nutrition consultation. Pt has had an initial appointment with Dr. Naqvi.   Weight loss medication: Victoza.   Pt's weight is 380 lbs   Initial weight: 370.5 lbs (last weight recorded 1 month ago)  Weight change: 9.5 lbs (up-due to limited mobility and eating the wrong foods)    No flowsheet data found.  BMI: There is no height or weight on file to calculate BMI.  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     Borderline personality disorder (H)     Encephalopathy     Essential hypertension     Intraventricular hemorrhage (H)     Hemiplegia (H)     ACP (advance care planning)     Physical deconditioning     Malnutrition (H)     TBI (traumatic brain injury) (H)     Unspecified episodic mood disorder     Diastolic dysfunction     Seizure disorder (H)     Wound of right foot     Foot infection     Bipolar disorder (H)     Tobacco dependence     Septicemia (H)     S/P coil embolization of cerebral aneurysm      Pulmonary hypertension (H)     PTSD (post-traumatic stress disorder)     CARMENZA (obstructive sleep apnea)     Neuropathy     Morbid obesity with BMI of 60.0-69.9, adult (H)     Obesity hypoventilation syndrome (H)     Microscopic hematuria     Lung nodules     Hyperglycemia     Hyperlipidemia, unspecified hyperlipidemia type     History of methamphetamine abuse (H)     Hemiparesis affecting right side as late effect of cerebrovascular accident (CVA) (H)     Heartburn     History of CVA (cerebrovascular accident)     Chest pain     Anxiety     Arteriovenous malformation of brain     Aphasia as late effect of cerebrovascular accident     Alcohol abuse, episodic     Adjustment disorder with depressed mood     Acute on chronic diastolic CHF (congestive heart failure) (H)     Lymphedema     Chronic pain     Hemiplegia of dominant side as late effect of cerebrovascular disease (H)     Impaired mobility     Mild persistent asthma without complication     Neuropathy of both feet     Shoulder joint pain     Type 2 diabetes mellitus without complication, without long-term current use of insulin (H)     Respiratory failure (H)     Nocturnal hypoxemia     Brain aneurysm     Diabetes: No     Progress on goals from last visit: Patient reports that she hasn't been as diligent about following her low Na diet as well as she should, noting that she is trying to get back on track.  Patient reports that she has had more slip ups these past couple of weeks, noting that her mental health has been a contributing factor to this.  Patient reports that she knows she needs to focus more on protein at meal times, noting that she is really only eating chicken for her protein food sources.     Beverages: Water, Pop (has started to drink more lately)   Exercise: limited due to being bed bound due to wounds on her lower extremity, some leg exercises     Nutrition Diagnosis:    Overweight/Obesity (NC 3.3) related to overeating and poor lifestyle  habits as evidenced by patient's subjective statements and BMI 69.6 kg/m2.      Intervention:  1. Food and/or nutrient delivery: balanced meals, adequate protein   2. Nutrition education: mindful eating, protein food sources  3. Nutrition counseling: exercise/increased mobility      Monitoring/Evaluation:    Goals:  1. Updated weight.   2. Focus on protein first, aiming for at least 1 high protein food source at each meal 3 times/week.   3. Continue to work on mindful eating/awareness.    4. Continue to follow a Low Na Diet.     Patient to follow up in 2 week(s) with RD      Phone call duration: 15 minutes      Odette Camargo RD        Again, thank you for allowing me to participate in the care of your patient.        Sincerely,        Odette Camargo RD

## 2021-12-14 NOTE — PROGRESS NOTES
Marlo Kearns is a 41 year old who is being evaluated via a billable telephone visit.      What phone number would you like to be contacted at? [phone]  How would you like to obtain your AVS? Mail a copy      Medical  Weight Loss Follow-Up Diet Evaluation  Assessment:  Marlo is presenting today for a follow up weight management nutrition consultation. Pt has had an initial appointment with Dr. Naqvi.   Weight loss medication: Victoza.   Pt's weight is 380 lbs   Initial weight: 370.5 lbs (last weight recorded 1 month ago)  Weight change: 9.5 lbs (up-due to limited mobility and eating the wrong foods)    No flowsheet data found.  BMI: There is no height or weight on file to calculate BMI.  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     Borderline personality disorder (H)     Encephalopathy     Essential hypertension     Intraventricular hemorrhage (H)     Hemiplegia (H)     ACP (advance care planning)     Physical deconditioning     Malnutrition (H)     TBI (traumatic brain injury) (H)     Unspecified episodic mood disorder     Diastolic dysfunction     Seizure disorder (H)     Wound of right foot     Foot infection     Bipolar disorder (H)     Tobacco dependence     Septicemia (H)     S/P coil embolization of cerebral aneurysm     Pulmonary hypertension (H)     PTSD (post-traumatic stress disorder)     CARMENZA (obstructive sleep apnea)     Neuropathy     Morbid obesity with BMI of 60.0-69.9, adult (H)     Obesity hypoventilation syndrome (H)     Microscopic hematuria     Lung nodules     Hyperglycemia      Hyperlipidemia, unspecified hyperlipidemia type     History of methamphetamine abuse (H)     Hemiparesis affecting right side as late effect of cerebrovascular accident (CVA) (H)     Heartburn     History of CVA (cerebrovascular accident)     Chest pain     Anxiety     Arteriovenous malformation of brain     Aphasia as late effect of cerebrovascular accident     Alcohol abuse, episodic     Adjustment disorder with depressed mood     Acute on chronic diastolic CHF (congestive heart failure) (H)     Lymphedema     Chronic pain     Hemiplegia of dominant side as late effect of cerebrovascular disease (H)     Impaired mobility     Mild persistent asthma without complication     Neuropathy of both feet     Shoulder joint pain     Type 2 diabetes mellitus without complication, without long-term current use of insulin (H)     Respiratory failure (H)     Nocturnal hypoxemia     Brain aneurysm     Diabetes: No     Progress on goals from last visit: Patient reports that she hasn't been as diligent about following her low Na diet as well as she should, noting that she is trying to get back on track.  Patient reports that she has had more slip ups these past couple of weeks, noting that her mental health has been a contributing factor to this.  Patient reports that she knows she needs to focus more on protein at meal times, noting that she is really only eating chicken for her protein food sources.     Beverages: Water, Pop (has started to drink more lately)   Exercise: limited due to being bed bound due to wounds on her lower extremity, some leg exercises     Nutrition Diagnosis:    Overweight/Obesity (NC 3.3) related to overeating and poor lifestyle habits as evidenced by patient's subjective statements and BMI 69.6 kg/m2.      Intervention:  1. Food and/or nutrient delivery: balanced meals, adequate protein   2. Nutrition education: mindful eating, protein food sources  3. Nutrition counseling: exercise/increased  mobility      Monitoring/Evaluation:    Goals:  1. Updated weight.   2. Focus on protein first, aiming for at least 1 high protein food source at each meal 3 times/week.   3. Continue to work on mindful eating/awareness.    4. Continue to follow a Low Na Diet.     Patient to follow up in 2 week(s) with RD      Phone call duration: 15 minutes      Odette Camargo RD

## 2021-12-18 ENCOUNTER — HEALTH MAINTENANCE LETTER (OUTPATIENT)
Age: 41
End: 2021-12-18

## 2021-12-23 DIAGNOSIS — M25.522 PAIN IN JOINT INVOLVING UPPER ARM, LEFT: ICD-10-CM

## 2021-12-23 RX ORDER — TIZANIDINE 2 MG/1
2 TABLET ORAL 3 TIMES DAILY
Qty: 90 TABLET | Refills: 0 | Status: SHIPPED | OUTPATIENT
Start: 2021-12-23 | End: 2022-01-19

## 2021-12-28 ENCOUNTER — TELEPHONE (OUTPATIENT)
Dept: SURGERY | Facility: CLINIC | Age: 41
End: 2021-12-28
Payer: MEDICAID

## 2021-12-28 NOTE — TELEPHONE ENCOUNTER
Multiple attempts made to contact patient in regards to appointment that was scheduled with this writer over the phone for today at 2:30 pm.  Patient did not answer the phone with attempts made, therefore left patient a detailed message including call center contact information to call and reschedule this appointment at her earliest convenience.

## 2021-12-31 ENCOUNTER — VIRTUAL VISIT (OUTPATIENT)
Dept: SURGERY | Facility: CLINIC | Age: 41
End: 2021-12-31
Payer: MEDICAID

## 2021-12-31 DIAGNOSIS — E78.5 HYPERLIPIDEMIA, UNSPECIFIED HYPERLIPIDEMIA TYPE: Primary | ICD-10-CM

## 2021-12-31 DIAGNOSIS — E66.2 CLASS 3 OBESITY WITH ALVEOLAR HYPOVENTILATION, SERIOUS COMORBIDITY, AND BODY MASS INDEX (BMI) OF 60.0 TO 69.9 IN ADULT (H): ICD-10-CM

## 2021-12-31 DIAGNOSIS — E66.813 CLASS 3 OBESITY WITH ALVEOLAR HYPOVENTILATION, SERIOUS COMORBIDITY, AND BODY MASS INDEX (BMI) OF 60.0 TO 69.9 IN ADULT (H): ICD-10-CM

## 2021-12-31 DIAGNOSIS — Z71.3 NUTRITIONAL COUNSELING: ICD-10-CM

## 2021-12-31 DIAGNOSIS — I50.33 ACUTE ON CHRONIC DIASTOLIC CHF (CONGESTIVE HEART FAILURE) (H): ICD-10-CM

## 2021-12-31 PROCEDURE — 97803 MED NUTRITION INDIV SUBSEQ: CPT | Performed by: DIETITIAN, REGISTERED

## 2021-12-31 NOTE — LETTER
12/31/2021         RE: Marlo Kearns  [address]  [place] [address]        Dear Colleague,    Thank you for referring your patient, Marlo Kearns, to the [facility] [place]. Please see a copy of my visit note below.    Marlo Kearns is a 41 year old who is being evaluated via a billable telephone visit.      What phone number would you like to be contacted at? [phone]  How would you like to obtain your AVS? Mail a copy      Medical  Weight Loss Follow-Up Diet Evaluation  Assessment:  Marlo is presenting today for a follow up weight management nutrition consultation. Pt has had an initial appointment with Dr. Naqvi.   Weight loss medication: Victoza-hasn't started yet.   Pt's weight is 382 lbs  Initial weight: 370.5 lbs  Weight change: 12.5 lbs (up)     No flowsheet data found.  BMI: There is no height or weight on file to calculate BMI.  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     Borderline personality disorder (H)     Encephalopathy     Essential hypertension     Intraventricular hemorrhage (H)     Hemiplegia (H)     ACP (advance care planning)     Physical deconditioning     Malnutrition (H)     TBI (traumatic brain injury) (H)     Unspecified episodic mood disorder     Diastolic dysfunction     Seizure disorder (H)     Wound of right foot     Foot infection     Bipolar disorder (H)     Tobacco dependence     Septicemia (H)     S/P coil embolization of cerebral aneurysm     Pulmonary hypertension (H)     PTSD (post-traumatic stress disorder)     CARMENZA (obstructive sleep apnea)     Neuropathy     Morbid obesity with  BMI of 60.0-69.9, adult (H)     Obesity hypoventilation syndrome (H)     Microscopic hematuria     Lung nodules     Hyperglycemia     Hyperlipidemia, unspecified hyperlipidemia type     History of methamphetamine abuse (H)     Hemiparesis affecting right side as late effect of cerebrovascular accident (CVA) (H)     Heartburn     History of CVA (cerebrovascular accident)     Chest pain     Anxiety     Arteriovenous malformation of brain     Aphasia as late effect of cerebrovascular accident     Alcohol abuse, episodic     Adjustment disorder with depressed mood     Acute on chronic diastolic CHF (congestive heart failure) (H)     Lymphedema     Chronic pain     Hemiplegia of dominant side as late effect of cerebrovascular disease (H)     Impaired mobility     Mild persistent asthma without complication     Neuropathy of both feet     Shoulder joint pain     Type 2 diabetes mellitus without complication, without long-term current use of insulin (H)     Respiratory failure (H)     Nocturnal hypoxemia     Brain aneurysm     Diabetes: No     Progress on goals from last visit: Hasn't been as good more recently regarding sodium consumption.  Continues to receive the Low Na MOM's meals once daily.  Patient reports that she is trying to be mindful regarding portion sizes at meals.  Patient reports that she has been eating more candy lately, noting that she contributes this to the Holidays.     Beverages: 2 bottles/day of Water, Pop (has been working on reducing it, switching to Diet)  Exercise: limited due to wounds on her lower extremity, toe injury in addition more recently, some leg exercises as tolerated     Nutrition Diagnosis:    Overweight/Obesity (NC 3.3) related to overeating and poor lifestyle habits as evidenced by patient's subjective statements and BMI 69.99 kg/m2.      Intervention:  1. Food and/or nutrient delivery: balanced meals, adequate protein   2. Nutrition education: pop consumption, water  consumption  3. Nutrition counseling: offering support and encouragement, increased mobility    Monitoring/Evaluation:    Goals:  1. Continue to work on increased water consumption along with decreasing the pop consumption, aiming for 4 bottles of water daily.   2. Continue to follow a low Na Diet.   3. Continue to work on portion sizes at meals, focusing primarily on protein.     Patient to follow up in 2 week(s) with ALBA        Phone call duration: 15 minutes      Odette Camargo RD        Again, thank you for allowing me to participate in the care of your patient.        Sincerely,        Odette Camargo RD

## 2021-12-31 NOTE — PROGRESS NOTES
Marlo Kearns is a 41 year old who is being evaluated via a billable telephone visit.      What phone number would you like to be contacted at? [phone]  How would you like to obtain your AVS? Mail a copy      Medical  Weight Loss Follow-Up Diet Evaluation  Assessment:  Marlo is presenting today for a follow up weight management nutrition consultation. Pt has had an initial appointment with Dr. Naqvi.   Weight loss medication: Victoza-hasn't started yet.   Pt's weight is 382 lbs  Initial weight: 370.5 lbs  Weight change: 12.5 lbs (up)     No flowsheet data found.  BMI: There is no height or weight on file to calculate BMI.  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     Borderline personality disorder (H)     Encephalopathy     Essential hypertension     Intraventricular hemorrhage (H)     Hemiplegia (H)     ACP (advance care planning)     Physical deconditioning     Malnutrition (H)     TBI (traumatic brain injury) (H)     Unspecified episodic mood disorder     Diastolic dysfunction     Seizure disorder (H)     Wound of right foot     Foot infection     Bipolar disorder (H)     Tobacco dependence     Septicemia (H)     S/P coil embolization of cerebral aneurysm     Pulmonary hypertension (H)     PTSD (post-traumatic stress disorder)     CARMENZA (obstructive sleep apnea)     Neuropathy     Morbid obesity with BMI of 60.0-69.9, adult (H)     Obesity hypoventilation syndrome (H)     Microscopic hematuria     Lung nodules     Hyperglycemia     Hyperlipidemia, unspecified hyperlipidemia type     History of methamphetamine abuse (H)     Hemiparesis affecting right side as late effect of  cerebrovascular accident (CVA) (H)     Heartburn     History of CVA (cerebrovascular accident)     Chest pain     Anxiety     Arteriovenous malformation of brain     Aphasia as late effect of cerebrovascular accident     Alcohol abuse, episodic     Adjustment disorder with depressed mood     Acute on chronic diastolic CHF (congestive heart failure) (H)     Lymphedema     Chronic pain     Hemiplegia of dominant side as late effect of cerebrovascular disease (H)     Impaired mobility     Mild persistent asthma without complication     Neuropathy of both feet     Shoulder joint pain     Type 2 diabetes mellitus without complication, without long-term current use of insulin (H)     Respiratory failure (H)     Nocturnal hypoxemia     Brain aneurysm     Diabetes: No     Progress on goals from last visit: Hasn't been as good more recently regarding sodium consumption.  Continues to receive the Low Na MOM's meals once daily.  Patient reports that she is trying to be mindful regarding portion sizes at meals.  Patient reports that she has been eating more candy lately, noting that she contributes this to the Holidays.     Beverages: 2 bottles/day of Water, Pop (has been working on reducing it, switching to Diet)  Exercise: limited due to wounds on her lower extremity, toe injury in addition more recently, some leg exercises as tolerated     Nutrition Diagnosis:    Overweight/Obesity (NC 3.3) related to overeating and poor lifestyle habits as evidenced by patient's subjective statements and BMI 69.99 kg/m2.      Intervention:  1. Food and/or nutrient delivery: balanced meals, adequate protein   2. Nutrition education: pop consumption, water consumption  3. Nutrition counseling: offering support and encouragement, increased mobility    Monitoring/Evaluation:    Goals:  1. Continue to work on increased water consumption along with decreasing the pop consumption, aiming for 4 bottles of water daily.   2. Continue to follow a low  Na Diet.   3. Continue to work on portion sizes at meals, focusing primarily on protein.     Patient to follow up in 2 week(s) with RD        Phone call duration: 15 minutes      Odette Camargo RD

## 2022-01-17 ENCOUNTER — VIRTUAL VISIT (OUTPATIENT)
Dept: SURGERY | Facility: CLINIC | Age: 42
End: 2022-01-17
Payer: MEDICAID

## 2022-01-17 DIAGNOSIS — E78.5 HYPERLIPIDEMIA, UNSPECIFIED HYPERLIPIDEMIA TYPE: Primary | ICD-10-CM

## 2022-01-17 DIAGNOSIS — E66.2 CLASS 3 OBESITY WITH ALVEOLAR HYPOVENTILATION, SERIOUS COMORBIDITY, AND BODY MASS INDEX (BMI) OF 60.0 TO 69.9 IN ADULT (H): ICD-10-CM

## 2022-01-17 DIAGNOSIS — I50.33 ACUTE ON CHRONIC DIASTOLIC CHF (CONGESTIVE HEART FAILURE) (H): ICD-10-CM

## 2022-01-17 DIAGNOSIS — Z71.3 NUTRITIONAL COUNSELING: ICD-10-CM

## 2022-01-17 DIAGNOSIS — E66.813 CLASS 3 OBESITY WITH ALVEOLAR HYPOVENTILATION, SERIOUS COMORBIDITY, AND BODY MASS INDEX (BMI) OF 60.0 TO 69.9 IN ADULT (H): ICD-10-CM

## 2022-01-17 PROCEDURE — 98967 PH1 ASSMT&MGMT NQHP 11-20: CPT | Performed by: DIETITIAN, REGISTERED

## 2022-01-17 NOTE — PROGRESS NOTES
Marlo Kearns is a 41 year old who is being evaluated via a billable telephone visit.      What phone number would you like to be contacted at? [phone]  How would you like to obtain your AVS? [facility]      Medical  Weight Loss Follow-Up Diet Evaluation  Assessment:  Marlo is presenting today for a follow up weight management nutrition consultation. Pt has had an initial appointment with Dr. Naqvi.   Weight loss medication: Victoza. -still hasn't started yet, plans on starting again this month  Pt's weight is 378 lbs   Initial weight: 370.5 lbs   Weight change: up 7.5 lbs (down 5 lbs since last visit)     No flowsheet data found.  BMI: There is no height or weight on file to calculate BMI.  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     Borderline personality disorder (H)     Encephalopathy     Essential hypertension     Intraventricular hemorrhage (H)     Hemiplegia (H)     ACP (advance care planning)     Physical deconditioning     Malnutrition (H)     TBI (traumatic brain injury) (H)     Unspecified episodic mood disorder     Diastolic dysfunction     Seizure disorder (H)     Wound of right foot     Foot infection     Bipolar disorder (H)     Tobacco dependence     Septicemia (H)     S/P coil embolization of cerebral aneurysm     Pulmonary hypertension (H)     PTSD (post-traumatic stress disorder)     CARMENZA (obstructive sleep apnea)     Neuropathy     Morbid obesity with BMI of 60.0-69.9, adult (H)     Obesity hypoventilation syndrome (H)     Microscopic hematuria     Lung nodules     Hyperglycemia     Hyperlipidemia, unspecified hyperlipidemia type     History of  methamphetamine abuse (H)     Hemiparesis affecting right side as late effect of cerebrovascular accident (CVA) (H)     Heartburn     History of CVA (cerebrovascular accident)     Chest pain     Anxiety     Arteriovenous malformation of brain     Aphasia as late effect of cerebrovascular accident     Alcohol abuse, episodic     Adjustment disorder with depressed mood     Acute on chronic diastolic CHF (congestive heart failure) (H)     Lymphedema     Chronic pain     Hemiplegia of dominant side as late effect of cerebrovascular disease (H)     Impaired mobility     Mild persistent asthma without complication     Neuropathy of both feet     Shoulder joint pain     Type 2 diabetes mellitus without complication, without long-term current use of insulin (H)     Respiratory failure (H)     Nocturnal hypoxemia     Brain aneurysm     Diabetes: No     Progress on goals from last visit: Patient has been working on back on track with her nutrition/diet.  Patient has a friend that has been doing the grocery shopping, noting that she has been trying to make healthier choices.  Patient reports that she needs to work on reduced her carbohydrates and increase her protein intake.  Patient reports that she continues to receive the Low Na MOM's meals.  Patient reports that she continues to work on increased water consumption along with reducing her pop consumption or even consuming carbonated water as an option.     Exercise: limited due to wounds on her lower extremity as well as a toe infection; some leg exercises as able/tolerated  Nutrition Diagnosis:    Overweight/Obesity (NC 3.3) related to overeating and poor lifestyle habits as evidenced by patient's subjective statements and BMI 69.3 kg/m2.     Intervention:  1. Food and/or nutrient delivery: balanced meals, adequate protein   2. Nutrition education: protein intake, Low Na reinforcement   3. Nutrition counseling: exercise regimen      Monitoring/Evaluation:    Goals:  1. Increase exercise, even if it is exercises in her bed to help with increased movement.   2. Continue to work on increased water consumption along with reduced pop consumption.   3. Continue to follow a Low Na Diet.    4. Continue to work on reduced portion sizes of carbohydrates/starches.     Patient to follow up in 2 week(s) with RD      Phone call duration: 20 minutes    Odette Camargo RD

## 2022-01-17 NOTE — LETTER
1/17/2022         RE: Marlo Kearns  [address]  [place] [address]        Dear Colleague,    Thank you for referring your patient, Marlo Kearns, to the [facility] [place]. Please see a copy of my visit note below.    Marlo Kearns is a 41 year old who is being evaluated via a billable telephone visit.      What phone number would you like to be contacted at? [phone]  How would you like to obtain your AVS? [facility]      Medical  Weight Loss Follow-Up Diet Evaluation  Assessment:  Marlo is presenting today for a follow up weight management nutrition consultation. Pt has had an initial appointment with Dr. Naqvi.   Weight loss medication: Victoza. -still hasn't started yet, plans on starting again this month  Pt's weight is 378 lbs   Initial weight: 370.5 lbs   Weight change: up 7.5 lbs (down 5 lbs since last visit)     No flowsheet data found.  BMI: There is no height or weight on file to calculate BMI.  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     Borderline personality disorder (H)     Encephalopathy     Essential hypertension     Intraventricular hemorrhage (H)     Hemiplegia (H)     ACP (advance care planning)     Physical deconditioning     Malnutrition (H)     TBI (traumatic brain injury) (H)     Unspecified episodic mood disorder     Diastolic dysfunction     Seizure disorder (H)     Wound of right foot     Foot infection     Bipolar disorder (H)     Tobacco dependence     Septicemia (H)     S/P coil embolization of cerebral aneurysm     Pulmonary hypertension (H)     PTSD (post-traumatic stress disorder)     CARMENZA  (obstructive sleep apnea)     Neuropathy     Morbid obesity with BMI of 60.0-69.9, adult (H)     Obesity hypoventilation syndrome (H)     Microscopic hematuria     Lung nodules     Hyperglycemia     Hyperlipidemia, unspecified hyperlipidemia type     History of methamphetamine abuse (H)     Hemiparesis affecting right side as late effect of cerebrovascular accident (CVA) (H)     Heartburn     History of CVA (cerebrovascular accident)     Chest pain     Anxiety     Arteriovenous malformation of brain     Aphasia as late effect of cerebrovascular accident     Alcohol abuse, episodic     Adjustment disorder with depressed mood     Acute on chronic diastolic CHF (congestive heart failure) (H)     Lymphedema     Chronic pain     Hemiplegia of dominant side as late effect of cerebrovascular disease (H)     Impaired mobility     Mild persistent asthma without complication     Neuropathy of both feet     Shoulder joint pain     Type 2 diabetes mellitus without complication, without long-term current use of insulin (H)     Respiratory failure (H)     Nocturnal hypoxemia     Brain aneurysm     Diabetes: No     Progress on goals from last visit: Patient has been working on back on track with her nutrition/diet.  Patient has a friend that has been doing the grocery shopping, noting that she has been trying to make healthier choices.  Patient reports that she needs to work on reduced her carbohydrates and increase her protein intake.  Patient reports that she continues to receive the Low Na MOM's meals.  Patient reports that she continues to work on increased water consumption along with reducing her pop consumption or even consuming carbonated water as an option.     Exercise: limited due to wounds on her lower extremity as well as a toe infection; some leg exercises as able/tolerated  Nutrition Diagnosis:    Overweight/Obesity (NC 3.3) related to overeating and poor lifestyle habits as evidenced by patient's subjective  statements and BMI 69.3 kg/m2.     Intervention:  1. Food and/or nutrient delivery: balanced meals, adequate protein   2. Nutrition education: protein intake, Low Na reinforcement   3. Nutrition counseling: exercise regimen     Monitoring/Evaluation:    Goals:  1. Increase exercise, even if it is exercises in her bed to help with increased movement.   2. Continue to work on increased water consumption along with reduced pop consumption.   3. Continue to follow a Low Na Diet.    4. Continue to work on reduced portion sizes of carbohydrates/starches.     Patient to follow up in 2 week(s) with RD      Phone call duration: 20 minutes    Odette Camargo RD        Again, thank you for allowing me to participate in the care of your patient.        Sincerely,        Odette Camargo RD

## 2022-01-19 ENCOUNTER — TELEPHONE (OUTPATIENT)
Dept: NEUROLOGY | Facility: CLINIC | Age: 42
End: 2022-01-19
Payer: MEDICAID

## 2022-01-19 DIAGNOSIS — M25.522 PAIN IN JOINT INVOLVING UPPER ARM, LEFT: ICD-10-CM

## 2022-01-19 RX ORDER — TIZANIDINE 2 MG/1
2 TABLET ORAL 3 TIMES DAILY
Qty: 90 TABLET | Refills: 0 | Status: SHIPPED | OUTPATIENT
Start: 2022-01-19

## 2022-01-19 NOTE — TELEPHONE ENCOUNTER
Refill request for Tizanidine. Pt last seen 9/29/21 and was due for follow up after lab work and EEG was completed. Labs and EEG have not been completed yet. Pt was sent a letter regarding this on 11/30/21. Will send in 30 day supply with zero refills.     Anni Crawley RN on 1/19/2022 at 8:52 AM

## 2022-01-31 ENCOUNTER — VIRTUAL VISIT (OUTPATIENT)
Dept: SURGERY | Facility: CLINIC | Age: 42
End: 2022-01-31
Payer: MEDICAID

## 2022-01-31 DIAGNOSIS — E66.813 CLASS 3 OBESITY WITH ALVEOLAR HYPOVENTILATION, SERIOUS COMORBIDITY, AND BODY MASS INDEX (BMI) OF 60.0 TO 69.9 IN ADULT (H): ICD-10-CM

## 2022-01-31 DIAGNOSIS — Z71.3 NUTRITIONAL COUNSELING: ICD-10-CM

## 2022-01-31 DIAGNOSIS — E66.2 CLASS 3 OBESITY WITH ALVEOLAR HYPOVENTILATION, SERIOUS COMORBIDITY, AND BODY MASS INDEX (BMI) OF 60.0 TO 69.9 IN ADULT (H): ICD-10-CM

## 2022-01-31 DIAGNOSIS — I50.33 ACUTE ON CHRONIC DIASTOLIC CHF (CONGESTIVE HEART FAILURE) (H): ICD-10-CM

## 2022-01-31 DIAGNOSIS — E78.5 HYPERLIPIDEMIA, UNSPECIFIED HYPERLIPIDEMIA TYPE: Primary | ICD-10-CM

## 2022-01-31 PROCEDURE — 98967 PH1 ASSMT&MGMT NQHP 11-20: CPT | Performed by: DIETITIAN, REGISTERED

## 2022-01-31 NOTE — PROGRESS NOTES
Marlo Kearns is a 41 year old who is being evaluated via a billable telephone visit.      What phone number would you like to be contacted at? [phone]  How would you like to obtain your AVS? [facility]      Medical  Weight Loss Follow-Up Diet Evaluation  Assessment:  Marlo is presenting today for a follow up weight management nutrition consultation. Pt has had an initial appointment with Dr. Naqvi.  Weight loss medication: Victoza-on hold currently.   Pt's weight is 375 lbs   Initial weight: 370.5 lbs  Weight change: down 3 lbs in the past 2 weeks    No flowsheet data found.  BMI: There is no height or weight on file to calculate BMI.  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     Borderline personality disorder (H)     Encephalopathy     Essential hypertension     Intraventricular hemorrhage (H)     Hemiplegia (H)     ACP (advance care planning)     Physical deconditioning     Malnutrition (H)     TBI (traumatic brain injury) (H)     Unspecified episodic mood disorder     Diastolic dysfunction     Seizure disorder (H)     Wound of right foot     Foot infection     Bipolar disorder (H)     Tobacco dependence     Septicemia (H)     S/P coil embolization of cerebral aneurysm     Pulmonary hypertension (H)     PTSD (post-traumatic stress disorder)     CARMENZA (obstructive sleep apnea)     Neuropathy     Morbid obesity with BMI of 60.0-69.9, adult (H)     Obesity hypoventilation syndrome (H)     Microscopic hematuria     Lung nodules     Hyperglycemia     Hyperlipidemia, unspecified hyperlipidemia type     History of methamphetamine abuse (H)     Hemiparesis affecting right side as  late effect of cerebrovascular accident (CVA) (H)     Heartburn     History of CVA (cerebrovascular accident)     Chest pain     Anxiety     Arteriovenous malformation of brain     Aphasia as late effect of cerebrovascular accident     Alcohol abuse, episodic     Adjustment disorder with depressed mood     Acute on chronic diastolic CHF (congestive heart failure) (H)     Lymphedema     Chronic pain     Hemiplegia of dominant side as late effect of cerebrovascular disease (H)     Impaired mobility     Mild persistent asthma without complication     Neuropathy of both feet     Shoulder joint pain     Type 2 diabetes mellitus without complication, without long-term current use of insulin (H)     Respiratory failure (H)     Nocturnal hypoxemia     Brain aneurysm     Diabetes: No    Progress on goals from last visit: Continues to stick with her MOM's meals, which are low sodium.  Patient reports that she notices that there is less fluid accumulation on her lower extremities.  Patient reports that she even started to cook a little herself, noting that she made chicken breast and a baked potato for supper last night.  Patient reports that she continues to work on reducing pop consumption (Mt Dew), noting that she is trying to switch to Diet Coke, carbonated water, and water.  Patient reports that she is trying to incorporate a modest amount of fruit into her diet, noting that she is trying to consume adequate potassium on a daily basis.      Exercise: limited due to lower extremity wounds as well as an infected toe-wound nurse comes into the home with dressing changes/care-has to stay in bed quite a bit, some leg exercises in bed     Nutrition Diagnosis:    Overweight/Obesity (NC 3.3) related to overeating and poor lifestyle habits as evidenced by patient's subjective statements and BMI 68.7 kg/m2.     Intervention:  1. Food and/or nutrient delivery: balanced meals, adequate protein  2. Nutrition education: calorie  containing beverages  3. Nutrition counseling: exercise regimen, provided patient with support and encouragement    Monitoring/Evaluation:    Goals:  1. Continue to work on elimination of calorie containing beverages (Mt Dew), focusing more so on increased water and carbonated water instead.   2. Continue to increase exercise as able/tolerated, pending wound status.   3. Continue to follow a Low Na Diet.   4. Continue to work on smaller portion sizes of carbohydrates/starches.     Patient to follow up in 2 week(s) with RD        Phone call duration: 15 minutes      Odette Camargo RD

## 2022-01-31 NOTE — LETTER
1/31/2022         RE: Marlo Kearns  [address]  [place] [address]        Dear Colleague,    Thank you for referring your patient, Marlo Kearns, to the [facility] [place]. Please see a copy of my visit note below.    Marlo Kearns is a 41 year old who is being evaluated via a billable telephone visit.      What phone number would you like to be contacted at? [phone]  How would you like to obtain your AVS? [facility]      Medical  Weight Loss Follow-Up Diet Evaluation  Assessment:  Marlo is presenting today for a follow up weight management nutrition consultation. Pt has had an initial appointment with Dr. Naqvi.  Weight loss medication: Victoza-on hold currently.   Pt's weight is 375 lbs   Initial weight: 370.5 lbs  Weight change: down 3 lbs in the past 2 weeks    No flowsheet data found.  BMI: There is no height or weight on file to calculate BMI.  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     Borderline personality disorder (H)     Encephalopathy     Essential hypertension     Intraventricular hemorrhage (H)     Hemiplegia (H)     ACP (advance care planning)     Physical deconditioning     Malnutrition (H)     TBI (traumatic brain injury) (H)     Unspecified episodic mood disorder     Diastolic dysfunction     Seizure disorder (H)     Wound of right foot     Foot infection     Bipolar disorder (H)     Tobacco dependence     Septicemia (H)     S/P coil embolization of cerebral aneurysm     Pulmonary hypertension (H)     PTSD (post-traumatic stress disorder)     CARMENZA (obstructive sleep apnea)     Neuropathy     Morbid  obesity with BMI of 60.0-69.9, adult (H)     Obesity hypoventilation syndrome (H)     Microscopic hematuria     Lung nodules     Hyperglycemia     Hyperlipidemia, unspecified hyperlipidemia type     History of methamphetamine abuse (H)     Hemiparesis affecting right side as late effect of cerebrovascular accident (CVA) (H)     Heartburn     History of CVA (cerebrovascular accident)     Chest pain     Anxiety     Arteriovenous malformation of brain     Aphasia as late effect of cerebrovascular accident     Alcohol abuse, episodic     Adjustment disorder with depressed mood     Acute on chronic diastolic CHF (congestive heart failure) (H)     Lymphedema     Chronic pain     Hemiplegia of dominant side as late effect of cerebrovascular disease (H)     Impaired mobility     Mild persistent asthma without complication     Neuropathy of both feet     Shoulder joint pain     Type 2 diabetes mellitus without complication, without long-term current use of insulin (H)     Respiratory failure (H)     Nocturnal hypoxemia     Brain aneurysm     Diabetes: No    Progress on goals from last visit: Continues to stick with her MOM's meals, which are low sodium.  Patient reports that she notices that there is less fluid accumulation on her lower extremities.  Patient reports that she even started to cook a little herself, noting that she made chicken breast and a baked potato for supper last night.  Patient reports that she continues to work on reducing pop consumption (Mt Dew), noting that she is trying to switch to Diet Coke, carbonated water, and water.  Patient reports that she is trying to incorporate a modest amount of fruit into her diet, noting that she is trying to consume adequate potassium on a daily basis.      Exercise: limited due to lower extremity wounds as well as an infected toe-wound nurse comes into the home with dressing changes/care-has to stay in bed quite a bit, some leg exercises in bed     Nutrition  Diagnosis:    Overweight/Obesity (NC 3.3) related to overeating and poor lifestyle habits as evidenced by patient's subjective statements and BMI 68.7 kg/m2.     Intervention:  1. Food and/or nutrient delivery: balanced meals, adequate protein  2. Nutrition education: calorie containing beverages  3. Nutrition counseling: exercise regimen, provided patient with support and encouragement    Monitoring/Evaluation:    Goals:  1. Continue to work on elimination of calorie containing beverages (Mt Dew), focusing more so on increased water and carbonated water instead.   2. Continue to increase exercise as able/tolerated, pending wound status.   3. Continue to follow a Low Na Diet.   4. Continue to work on smaller portion sizes of carbohydrates/starches.     Patient to follow up in 2 week(s) with RD        Phone call duration: 15 minutes      Odette Camargo RD        Again, thank you for allowing me to participate in the care of your patient.        Sincerely,        Odette Camargo RD

## 2022-02-14 ENCOUNTER — VIRTUAL VISIT (OUTPATIENT)
Dept: SURGERY | Facility: CLINIC | Age: 42
End: 2022-02-14
Payer: MEDICAID

## 2022-02-14 DIAGNOSIS — I50.33 ACUTE ON CHRONIC DIASTOLIC CHF (CONGESTIVE HEART FAILURE) (H): ICD-10-CM

## 2022-02-14 DIAGNOSIS — Z71.3 NUTRITIONAL COUNSELING: ICD-10-CM

## 2022-02-14 DIAGNOSIS — E78.5 HYPERLIPIDEMIA, UNSPECIFIED HYPERLIPIDEMIA TYPE: Primary | ICD-10-CM

## 2022-02-14 DIAGNOSIS — E66.813 CLASS 3 OBESITY WITH ALVEOLAR HYPOVENTILATION, SERIOUS COMORBIDITY, AND BODY MASS INDEX (BMI) OF 60.0 TO 69.9 IN ADULT (H): ICD-10-CM

## 2022-02-14 DIAGNOSIS — E66.2 CLASS 3 OBESITY WITH ALVEOLAR HYPOVENTILATION, SERIOUS COMORBIDITY, AND BODY MASS INDEX (BMI) OF 60.0 TO 69.9 IN ADULT (H): ICD-10-CM

## 2022-02-14 PROCEDURE — 98967 PH1 ASSMT&MGMT NQHP 11-20: CPT | Performed by: DIETITIAN, REGISTERED

## 2022-02-14 NOTE — LETTER
2/14/2022         RE: Marlo Kearns  [address]  [place] [address]        Dear Colleague,    Thank you for referring your patient, Marlo Kearns, to the [facility] [place]. Please see a copy of my visit note below.    Marlo Kearns is a 41 year old who is being evaluated via a billable telephone visit.      What phone number would you like to be contacted at? [phone]  How would you like to obtain your AVS? [facility]      Medical  Weight Loss Follow-Up Diet Evaluation  Assessment:  Marlo is presenting today for a follow up weight management nutrition consultation. Pt has had an initial appointment with Dr. Naqvi.   Weight loss medication: Victoza-still on hold.   Pt's weight is 373 lbs   Initial weight: 370 lbs   Weight change: down another 2 lbs since last visit     No flowsheet data found.  BMI: There is no height or weight on file to calculate BMI.  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     Borderline personality disorder (H)     Encephalopathy     Essential hypertension     Intraventricular hemorrhage (H)     Hemiplegia (H)     ACP (advance care planning)     Physical deconditioning     Malnutrition (H)     TBI (traumatic brain injury) (H)     Unspecified episodic mood disorder     Diastolic dysfunction     Seizure disorder (H)     Wound of right foot     Foot infection     Bipolar disorder (H)     Tobacco dependence     Septicemia (H)     S/P coil embolization of cerebral aneurysm     Pulmonary hypertension (H)     PTSD (post-traumatic stress disorder)     CARMENZA (obstructive sleep apnea)     Neuropathy     Morbid  obesity with BMI of 60.0-69.9, adult (H)     Obesity hypoventilation syndrome (H)     Microscopic hematuria     Lung nodules     Hyperglycemia     Hyperlipidemia, unspecified hyperlipidemia type     History of methamphetamine abuse (H)     Hemiparesis affecting right side as late effect of cerebrovascular accident (CVA) (H)     Heartburn     History of CVA (cerebrovascular accident)     Chest pain     Anxiety     Arteriovenous malformation of brain     Aphasia as late effect of cerebrovascular accident     Alcohol abuse, episodic     Adjustment disorder with depressed mood     Acute on chronic diastolic CHF (congestive heart failure) (H)     Lymphedema     Chronic pain     Hemiplegia of dominant side as late effect of cerebrovascular disease (H)     Impaired mobility     Mild persistent asthma without complication     Neuropathy of both feet     Shoulder joint pain     Type 2 diabetes mellitus without complication, without long-term current use of insulin (H)     Respiratory failure (H)     Nocturnal hypoxemia     Brain aneurysm     Diabetes: No     Progress on goals from last visit: Trying to focus on protein first along with non starchy vegetables at suppers along with still receiving the MOM's meals that are low sodium 1 time/day.  Patient reports that she is trying to be mindful regarding carbohydrate consumption, trying to limit her portion sizes.  Patient reports that she is dealing with some depression, therefore trying to do the best she can in terms of eating healthy.  Patient reports that she has been either consuming grapes or greek yogurt as a snack option.  Patient reports that she is trying to incorporate more water into her diet, noting that she has been consuming more Coke and Pepsi these days.    Exercise: more strengthening exercises with arms, legs, and abdomen.  Patient reports that she is still limited in terms of being laid up due to lower extremities wound issues.     Nutrition  Diagnosis:    Overweight/Obesity (NC 3.3) related to overeating and poor lifestyle habits as evidenced by patient's subjective statements and BMI of 68.3 kg/m2.     Intervention:  1. Food and/or nutrient delivery: balanced meals, adequate protein   2. Nutrition education: calorie containing beverages  3. Nutrition counseling: exercise regimen, provided patient with support and encouragement      Monitoring/Evaluation:    Goals:  1. Continue to work on elimination of calorie containing beverages, focusing more on increased water consumption.   2. Continue to follow a Low Na Diet.   3. Continue to increase exercise as able/tolerated, pending wound issues.     Patient to follow up in 2 week(s) with ALBA      Phone call duration: 17 minutes      Odette Camargo RD        Again, thank you for allowing me to participate in the care of your patient.        Sincerely,        Odette Camargo RD

## 2022-02-23 ENCOUNTER — TELEPHONE (OUTPATIENT)
Dept: NEUROLOGY | Facility: CLINIC | Age: 42
End: 2022-02-23
Payer: MEDICAID

## 2022-02-23 DIAGNOSIS — M25.522 PAIN IN JOINT INVOLVING UPPER ARM, LEFT: Primary | ICD-10-CM

## 2022-02-23 RX ORDER — TIZANIDINE 2 MG/1
2 TABLET ORAL 3 TIMES DAILY
Qty: 42 TABLET | Refills: 0 | Status: SHIPPED | OUTPATIENT
Start: 2022-02-23 | End: 2024-01-01

## 2022-02-23 NOTE — TELEPHONE ENCOUNTER
Refill request for Tizanidine. Pt last seen 9/29/21 and was due to be seen after testing. Letter has been sent regarding this. Will send 14 day supply with zero refills.     Anni Crawley RN on 2/23/2022 at 10:59 AM

## 2022-03-01 ENCOUNTER — VIRTUAL VISIT (OUTPATIENT)
Dept: SURGERY | Facility: CLINIC | Age: 42
End: 2022-03-01
Payer: MEDICAID

## 2022-03-01 DIAGNOSIS — I50.33 ACUTE ON CHRONIC DIASTOLIC CHF (CONGESTIVE HEART FAILURE) (H): ICD-10-CM

## 2022-03-01 DIAGNOSIS — Z71.3 NUTRITIONAL COUNSELING: ICD-10-CM

## 2022-03-01 DIAGNOSIS — E78.5 HYPERLIPIDEMIA, UNSPECIFIED HYPERLIPIDEMIA TYPE: Primary | ICD-10-CM

## 2022-03-01 DIAGNOSIS — E66.813 CLASS 3 OBESITY WITH ALVEOLAR HYPOVENTILATION, SERIOUS COMORBIDITY, AND BODY MASS INDEX (BMI) OF 60.0 TO 69.9 IN ADULT (H): ICD-10-CM

## 2022-03-01 DIAGNOSIS — E66.2 CLASS 3 OBESITY WITH ALVEOLAR HYPOVENTILATION, SERIOUS COMORBIDITY, AND BODY MASS INDEX (BMI) OF 60.0 TO 69.9 IN ADULT (H): ICD-10-CM

## 2022-03-01 PROCEDURE — 98967 PH1 ASSMT&MGMT NQHP 11-20: CPT | Performed by: DIETITIAN, REGISTERED

## 2022-03-01 NOTE — LETTER
3/1/2022         RE: Marlo Kearns  [address]  [place] [address]        Dear Colleague,    Thank you for referring your patient, Marlo Kearns, to the [facility] [place]. Please see a copy of my visit note below.    Marlo Kearns is a 41 year old who is being evaluated via a billable telephone visit.      What phone number would you like to be contacted at? [phone]  How would you like to obtain your AVS? [facility]      Medical  Weight Loss Follow-Up Diet Evaluation  Assessment:  Marlo is presenting today for a follow up weight management nutrition consultation. Pt has had an initial appointment with Dr. Naqvi  Weight loss medication: Victoza-currently on hold.   Pt's weight is 373 lbs (last weight, unable to weigh self-no batteries in her scale)   Initial weight: 370 lbs       No flowsheet data found.  BMI: There is no height or weight on file to calculate BMI.  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     Borderline personality disorder (H)     Encephalopathy     Essential hypertension     Intraventricular hemorrhage (H)     Hemiplegia (H)     ACP (advance care planning)     Physical deconditioning     Malnutrition (H)     TBI (traumatic brain injury) (H)     Unspecified episodic mood disorder     Diastolic dysfunction     Seizure disorder (H)     Wound of right foot     Foot infection     Bipolar disorder (H)     Tobacco dependence     Septicemia (H)     S/P coil embolization of cerebral aneurysm     Pulmonary hypertension (H)     PTSD (post-traumatic stress disorder)     CARMENZA (obstructive sleep apnea)     Neuropathy      Morbid obesity with BMI of 60.0-69.9, adult (H)     Obesity hypoventilation syndrome (H)     Microscopic hematuria     Lung nodules     Hyperglycemia     Hyperlipidemia, unspecified hyperlipidemia type     History of methamphetamine abuse (H)     Hemiparesis affecting right side as late effect of cerebrovascular accident (CVA) (H)     Heartburn     History of CVA (cerebrovascular accident)     Chest pain     Anxiety     Arteriovenous malformation of brain     Aphasia as late effect of cerebrovascular accident     Alcohol abuse, episodic     Adjustment disorder with depressed mood     Acute on chronic diastolic CHF (congestive heart failure) (H)     Lymphedema     Chronic pain     Hemiplegia of dominant side as late effect of cerebrovascular disease (H)     Impaired mobility     Mild persistent asthma without complication     Neuropathy of both feet     Shoulder joint pain     Type 2 diabetes mellitus without complication, without long-term current use of insulin (H)     Respiratory failure (H)     Nocturnal hypoxemia     Brain aneurysm     Diabetes: yes    Progress on goals from last visit: Has switched to Diet Coke vs Regular Coke as well as trying to incorporate more water into the day as well.  Patient reports that she continues to use the MOM's meals, which are Low Na.  Patient reports that she has been trying to snack on fruit for a healthy alternative.  Patient notes that she has probably consumed a little more Na than what she should, noting that she ran out of her MOM's meal.  Patient reports that she has consumed donuts a couple times this week, noting that she is eating out of stress with a lot going on.  Patient reports that she has also consumed more alcohol these days, noting that she is trying to work on reducing it.      Exercise: stretching exercises more so than anything, some strengthening exercises as well, limited exercise due to lower extremities wound issues.    Nutrition  Diagnosis:    Overweight/Obesity (NC 3.3) related to overeating and poor lifestyle habits as evidenced by patient's subjective statements and BMI of 68.3 kg/m2.     Intervention:  1. Food and/or nutrient delivery: balanced meals, adequate protein   2. Nutrition education: calorie containing beverages   3. Nutrition counseling: exercise regimen, provided support and encouragement    Monitoring/Evaluation:    Goals:  1. Continue to work on elimination of calorie containing beverages (I.e. alcohol).   2. Continue to focus on a Low Na Diet.   3. Continue to increase exercise as able/tolerated, pending wound issues.     Patient to follow up in 1 week(s) with ALBA        Phone call duration: 15 minutes      Odette Camargo RD        Again, thank you for allowing me to participate in the care of your patient.        Sincerely,        Odette Camargo RD

## 2022-03-01 NOTE — PROGRESS NOTES
Marlo Kearns is a 41 year old who is being evaluated via a billable telephone visit.      What phone number would you like to be contacted at? [phone]  How would you like to obtain your AVS? [facility]      Medical  Weight Loss Follow-Up Diet Evaluation  Assessment:  Marlo is presenting today for a follow up weight management nutrition consultation. Pt has had an initial appointment with Dr. Naqvi  Weight loss medication: Victoza-currently on hold.   Pt's weight is 373 lbs (last weight, unable to weigh self-no batteries in her scale)   Initial weight: 370 lbs       No flowsheet data found.  BMI: There is no height or weight on file to calculate BMI.  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     Borderline personality disorder (H)     Encephalopathy     Essential hypertension     Intraventricular hemorrhage (H)     Hemiplegia (H)     ACP (advance care planning)     Physical deconditioning     Malnutrition (H)     TBI (traumatic brain injury) (H)     Unspecified episodic mood disorder     Diastolic dysfunction     Seizure disorder (H)     Wound of right foot     Foot infection     Bipolar disorder (H)     Tobacco dependence     Septicemia (H)     S/P coil embolization of cerebral aneurysm     Pulmonary hypertension (H)     PTSD (post-traumatic stress disorder)     CARMENZA (obstructive sleep apnea)     Neuropathy     Morbid obesity with BMI of 60.0-69.9, adult (H)     Obesity hypoventilation syndrome (H)     Microscopic hematuria     Lung nodules     Hyperglycemia     Hyperlipidemia, unspecified hyperlipidemia type     History of methamphetamine abuse (H)     Hemiparesis affecting  right side as late effect of cerebrovascular accident (CVA) (H)     Heartburn     History of CVA (cerebrovascular accident)     Chest pain     Anxiety     Arteriovenous malformation of brain     Aphasia as late effect of cerebrovascular accident     Alcohol abuse, episodic     Adjustment disorder with depressed mood     Acute on chronic diastolic CHF (congestive heart failure) (H)     Lymphedema     Chronic pain     Hemiplegia of dominant side as late effect of cerebrovascular disease (H)     Impaired mobility     Mild persistent asthma without complication     Neuropathy of both feet     Shoulder joint pain     Type 2 diabetes mellitus without complication, without long-term current use of insulin (H)     Respiratory failure (H)     Nocturnal hypoxemia     Brain aneurysm     Diabetes: yes    Progress on goals from last visit: Has switched to Diet Coke vs Regular Coke as well as trying to incorporate more water into the day as well.  Patient reports that she continues to use the MOM's meals, which are Low Na.  Patient reports that she has been trying to snack on fruit for a healthy alternative.  Patient notes that she has probably consumed a little more Na than what she should, noting that she ran out of her MOM's meal.  Patient reports that she has consumed donuts a couple times this week, noting that she is eating out of stress with a lot going on.  Patient reports that she has also consumed more alcohol these days, noting that she is trying to work on reducing it.      Exercise: stretching exercises more so than anything, some strengthening exercises as well, limited exercise due to lower extremities wound issues.    Nutrition Diagnosis:    Overweight/Obesity (NC 3.3) related to overeating and poor lifestyle habits as evidenced by patient's subjective statements and BMI of 68.3 kg/m2.     Intervention:  1. Food and/or nutrient delivery: balanced meals, adequate protein   2. Nutrition education: calorie containing  beverages   3. Nutrition counseling: exercise regimen, provided support and encouragement    Monitoring/Evaluation:    Goals:  1. Continue to work on elimination of calorie containing beverages (I.e. alcohol).   2. Continue to focus on a Low Na Diet.   3. Continue to increase exercise as able/tolerated, pending wound issues.     Patient to follow up in 1 week(s) with RD        Phone call duration: 15 minutes      Odette Camargo RD

## 2022-03-08 ENCOUNTER — TELEPHONE (OUTPATIENT)
Dept: SURGERY | Facility: CLINIC | Age: 42
End: 2022-03-08
Payer: MEDICAID

## 2022-03-08 ENCOUNTER — TELEPHONE (OUTPATIENT)
Dept: SURGERY | Facility: CLINIC | Age: 42
End: 2022-03-08

## 2022-03-08 ENCOUNTER — VIRTUAL VISIT (OUTPATIENT)
Dept: SURGERY | Facility: CLINIC | Age: 42
End: 2022-03-08
Payer: MEDICAID

## 2022-03-08 DIAGNOSIS — E66.2 CLASS 3 OBESITY WITH ALVEOLAR HYPOVENTILATION, SERIOUS COMORBIDITY, AND BODY MASS INDEX (BMI) OF 60.0 TO 69.9 IN ADULT (H): ICD-10-CM

## 2022-03-08 DIAGNOSIS — E78.5 HYPERLIPIDEMIA, UNSPECIFIED HYPERLIPIDEMIA TYPE: Primary | ICD-10-CM

## 2022-03-08 DIAGNOSIS — E66.813 CLASS 3 OBESITY WITH ALVEOLAR HYPOVENTILATION, SERIOUS COMORBIDITY, AND BODY MASS INDEX (BMI) OF 60.0 TO 69.9 IN ADULT (H): ICD-10-CM

## 2022-03-08 DIAGNOSIS — I50.33 ACUTE ON CHRONIC DIASTOLIC CHF (CONGESTIVE HEART FAILURE) (H): ICD-10-CM

## 2022-03-08 DIAGNOSIS — Z71.3 NUTRITIONAL COUNSELING: ICD-10-CM

## 2022-03-08 PROCEDURE — 98966 PH1 ASSMT&MGMT NQHP 5-10: CPT | Performed by: DIETITIAN, REGISTERED

## 2022-03-08 NOTE — TELEPHONE ENCOUNTER
Multiple attempts made to contact patient in regards to appointment that was scheduled for today with this writer at 2:30 pm via the phone.  Patient did not answer the phone with multiple attempts, made therefore left patient a detailed message including call center contact information to call and reschedule this appointment at her earliest convenience.

## 2022-03-08 NOTE — LETTER
3/8/2022         RE: Marlo Kearns  [address]  [place] [address]        Dear Colleague,    Thank you for referring your patient, Marlo Kearns, to the [facility] [place]. Please see a copy of my visit note below.    Marlo Kearns is a 41 year old who is being evaluated via a billable telephone visit.      What phone number would you like to be contacted at? [phone]  How would you like to obtain your AVS? [facility]    Medical  Weight Loss Follow-Up Diet Evaluation  Assessment:  Marlo is presenting today for a follow up weight management nutrition consultation. Pt has had an initial appointment with Dr. Naqvi.   Weight loss medication: Victoza-on hold .   Pt's weight is 375 lbs (up 2 lbs in the past week)   Initial weight: 370 lbs      No flowsheet data found.  BMI: There is no height or weight on file to calculate BMI.  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     Borderline personality disorder (H)     Encephalopathy     Essential hypertension     Intraventricular hemorrhage (H)     Hemiplegia (H)     ACP (advance care planning)     Physical deconditioning     Malnutrition (H)     TBI (traumatic brain injury) (H)     Unspecified episodic mood disorder     Diastolic dysfunction     Seizure disorder (H)     Wound of right foot     Foot infection     Bipolar disorder (H)     Tobacco dependence     Septicemia (H)     S/P coil embolization of cerebral aneurysm     Pulmonary hypertension (H)     PTSD (post-traumatic stress disorder)     CARMENZA (obstructive sleep apnea)     Neuropathy     Morbid obesity with BMI of 60.0-69.9,  adult (H)     Obesity hypoventilation syndrome (H)     Microscopic hematuria     Lung nodules     Hyperglycemia     Hyperlipidemia, unspecified hyperlipidemia type     History of methamphetamine abuse (H)     Hemiparesis affecting right side as late effect of cerebrovascular accident (CVA) (H)     Heartburn     History of CVA (cerebrovascular accident)     Chest pain     Anxiety     Arteriovenous malformation of brain     Aphasia as late effect of cerebrovascular accident     Alcohol abuse, episodic     Adjustment disorder with depressed mood     Acute on chronic diastolic CHF (congestive heart failure) (H)     Lymphedema     Chronic pain     Hemiplegia of dominant side as late effect of cerebrovascular disease (H)     Impaired mobility     Mild persistent asthma without complication     Neuropathy of both feet     Shoulder joint pain     Type 2 diabetes mellitus without complication, without long-term current use of insulin (H)     Respiratory failure (H)     Nocturnal hypoxemia     Brain aneurysm     Diabetes: No     Progress on goals from last visit: has had a fever more recently, trying to eat more fruit (just because it is easier).  Patient reports that she did get her MOM's meals back, which is a good thing.  Patient reports that she is probably consuming a little more than she should.  Patient reports that she is trying to focus on more water consumption and less pop consumption, however does admit to having 2 regular pops more recently.  Will probably be getting an IV antibiotic due to non healing infected wound per patient as early as tonight (is what she is anticipating).       Exercise: stretching exercises more than anything, some stairs here and there, limited due to lower extremity wounds    Nutrition Diagnosis:    Overweight/Obesity (NC 3.3) related to overeating and poor lifestyle habits as evidenced by patient's subjective statements and BMI of 68.7 kg/m2.     Intervention:  1. Food and/or nutrient  delivery: balanced meals, adequate protein   2. Nutrition education: reinforcement pertaining to nutrition   3. Nutrition counseling: exercise routine    Monitoring/Evaluation:    Goals:  1. Continue to work on elimination of calorie containing beverages, focusing primarily on water.   2. Continue to follow a Low Na Diet.   3. Continue to exercise as able/tolerated, pending wounds.      Patient to follow up in 2 week(s) with RD        Phone call duration: 10 minutes      Odette Camargo RD        Again, thank you for allowing me to participate in the care of your patient.        Sincerely,        Odette Camargo RD

## 2022-03-08 NOTE — PROGRESS NOTES
Marlo Kearns is a 41 year old who is being evaluated via a billable telephone visit.      What phone number would you like to be contacted at? [phone]  How would you like to obtain your AVS? [facility]    Medical  Weight Loss Follow-Up Diet Evaluation  Assessment:  Marlo is presenting today for a follow up weight management nutrition consultation. Pt has had an initial appointment with Dr. Naqvi.   Weight loss medication: Victoza-on hold .   Pt's weight is 375 lbs (up 2 lbs in the past week)   Initial weight: 370 lbs      No flowsheet data found.  BMI: There is no height or weight on file to calculate BMI.  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     Borderline personality disorder (H)     Encephalopathy     Essential hypertension     Intraventricular hemorrhage (H)     Hemiplegia (H)     ACP (advance care planning)     Physical deconditioning     Malnutrition (H)     TBI (traumatic brain injury) (H)     Unspecified episodic mood disorder     Diastolic dysfunction     Seizure disorder (H)     Wound of right foot     Foot infection     Bipolar disorder (H)     Tobacco dependence     Septicemia (H)     S/P coil embolization of cerebral aneurysm     Pulmonary hypertension (H)     PTSD (post-traumatic stress disorder)     CARMENZA (obstructive sleep apnea)     Neuropathy     Morbid obesity with BMI of 60.0-69.9, adult (H)     Obesity hypoventilation syndrome (H)     Microscopic hematuria     Lung nodules     Hyperglycemia     Hyperlipidemia, unspecified hyperlipidemia type     History of methamphetamine abuse (H)     Hemiparesis affecting right side as late effect of  cerebrovascular accident (CVA) (H)     Heartburn     History of CVA (cerebrovascular accident)     Chest pain     Anxiety     Arteriovenous malformation of brain     Aphasia as late effect of cerebrovascular accident     Alcohol abuse, episodic     Adjustment disorder with depressed mood     Acute on chronic diastolic CHF (congestive heart failure) (H)     Lymphedema     Chronic pain     Hemiplegia of dominant side as late effect of cerebrovascular disease (H)     Impaired mobility     Mild persistent asthma without complication     Neuropathy of both feet     Shoulder joint pain     Type 2 diabetes mellitus without complication, without long-term current use of insulin (H)     Respiratory failure (H)     Nocturnal hypoxemia     Brain aneurysm     Diabetes: No     Progress on goals from last visit: has had a fever more recently, trying to eat more fruit (just because it is easier).  Patient reports that she did get her MOM's meals back, which is a good thing.  Patient reports that she is probably consuming a little more than she should.  Patient reports that she is trying to focus on more water consumption and less pop consumption, however does admit to having 2 regular pops more recently.  Will probably be getting an IV antibiotic due to non healing infected wound per patient as early as tonight (is what she is anticipating).       Exercise: stretching exercises more than anything, some stairs here and there, limited due to lower extremity wounds    Nutrition Diagnosis:    Overweight/Obesity (NC 3.3) related to overeating and poor lifestyle habits as evidenced by patient's subjective statements and BMI of 68.7 kg/m2.     Intervention:  1. Food and/or nutrient delivery: balanced meals, adequate protein   2. Nutrition education: reinforcement pertaining to nutrition   3. Nutrition counseling: exercise routine    Monitoring/Evaluation:    Goals:  1. Continue to work on elimination of calorie containing beverages,  focusing primarily on water.   2. Continue to follow a Low Na Diet.   3. Continue to exercise as able/tolerated, pending wounds.      Patient to follow up in 2 week(s) with RD        Phone call duration: 10 minutes      Odette Camargo RD

## 2022-03-28 DIAGNOSIS — G40.209 LOCALIZATION-RELATED PARTIAL EPILEPSY WITH COMPLEX PARTIAL SEIZURES (H): Primary | ICD-10-CM

## 2022-03-29 ENCOUNTER — VIRTUAL VISIT (OUTPATIENT)
Dept: SURGERY | Facility: CLINIC | Age: 42
End: 2022-03-29
Payer: MEDICAID

## 2022-03-29 DIAGNOSIS — E66.813 CLASS 3 SEVERE OBESITY DUE TO EXCESS CALORIES WITH SERIOUS COMORBIDITY AND BODY MASS INDEX (BMI) GREATER THAN OR EQUAL TO 70 IN ADULT (H): ICD-10-CM

## 2022-03-29 DIAGNOSIS — E78.5 HYPERLIPIDEMIA, UNSPECIFIED HYPERLIPIDEMIA TYPE: Primary | ICD-10-CM

## 2022-03-29 DIAGNOSIS — I50.33 ACUTE ON CHRONIC DIASTOLIC CHF (CONGESTIVE HEART FAILURE) (H): ICD-10-CM

## 2022-03-29 DIAGNOSIS — E66.01 CLASS 3 SEVERE OBESITY DUE TO EXCESS CALORIES WITH SERIOUS COMORBIDITY AND BODY MASS INDEX (BMI) GREATER THAN OR EQUAL TO 70 IN ADULT (H): ICD-10-CM

## 2022-03-29 DIAGNOSIS — Z71.3 NUTRITIONAL COUNSELING: ICD-10-CM

## 2022-03-29 PROCEDURE — 98967 PH1 ASSMT&MGMT NQHP 11-20: CPT | Performed by: DIETITIAN, REGISTERED

## 2022-03-29 RX ORDER — LAMOTRIGINE 150 MG/1
150 TABLET ORAL 2 TIMES DAILY
Qty: 28 TABLET | Refills: 0 | Status: SHIPPED | OUTPATIENT
Start: 2022-03-29 | End: 2022-04-15

## 2022-03-29 NOTE — LETTER
3/29/2022         RE: Marlo Kearns  [address]  [place] [address]        Dear Colleague,    Thank you for referring your patient, Marlo Kearns, to the [facility] [place]. Please see a copy of my visit note below.    Marlo Kearns is a 41 year old who is being evaluated via a billable telephone visit.      What phone number would you like to be contacted at? [phone]  How would you like to obtain your AVS? Mail a copy      Medical  Weight Loss Follow-Up Diet Evaluation  Assessment:  Marlo is presenting today for a follow up weight management nutrition consultation. Pt has had an initial appointment with Dr. Naqvi.   Weight loss medication: Victoza-on hold.   Pt's weight is 390 lbs  Initial weight: 370 lbs   Weight change: 20 lbs (up-patient notes that she does have fluid retention in her legs, however appetite has gone up as well)    No flowsheet data found.  BMI: There is no height or weight on file to calculate BMI.  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     Borderline personality disorder (H)     Encephalopathy     Essential hypertension     Intraventricular hemorrhage (H)     Hemiplegia (H)     ACP (advance care planning)     Physical deconditioning     Malnutrition (H)     TBI (traumatic brain injury) (H)     Unspecified episodic mood disorder     Diastolic dysfunction     Seizure disorder (H)     Wound of right foot     Foot infection     Bipolar disorder (H)     Tobacco dependence     Septicemia (H)     S/P coil embolization of cerebral aneurysm     Pulmonary hypertension (H)     PTSD (post-traumatic stress  disorder)     CARMENZA (obstructive sleep apnea)     Neuropathy     Morbid obesity with BMI of 60.0-69.9, adult (H)     Obesity hypoventilation syndrome (H)     Microscopic hematuria     Lung nodules     Hyperglycemia     Hyperlipidemia, unspecified hyperlipidemia type     History of methamphetamine abuse (H)     Hemiparesis affecting right side as late effect of cerebrovascular accident (CVA) (H)     Heartburn     History of CVA (cerebrovascular accident)     Chest pain     Anxiety     Arteriovenous malformation of brain     Aphasia as late effect of cerebrovascular accident     Alcohol abuse, episodic     Adjustment disorder with depressed mood     Acute on chronic diastolic CHF (congestive heart failure) (H)     Lymphedema     Chronic pain     Hemiplegia of dominant side as late effect of cerebrovascular disease (H)     Impaired mobility     Mild persistent asthma without complication     Neuropathy of both feet     Shoulder joint pain     Type 2 diabetes mellitus without complication, without long-term current use of insulin (H)     Respiratory failure (H)     Nocturnal hypoxemia     Brain aneurysm     Diabetes: No     Progress on goals from last visit: Patient reports that she has fallen off track recently, noting that she is feeling more hungry and eating larger amounts.  Patient reports that she still receives the MOM's meals once daily, which are Low Na.  Patient reports that she has been trying to keep pop consumption down to very little, consuming more water and coffee instead.  Patient reports that she is feeling defeated, noting that she is stuck in terms of accessing healthy options for meals/snacks at the grocery store since her friend is ill and unable to  her groceries.  Patient notes that her dad can  groceries, but tends to purchase unhealthy foods as well, which makes it difficult to avoid consuming.    Exercise: limited due to lower extremity wounds, stretching exercises, sit-ups      Nutrition Diagnosis:    Overweight/Obesity (NC 3.3) related to overeating and poor lifestyle habits as evidenced by patient's subjective statements and BMI of 71.5 kg/m2.     Intervention:  1. Food and/or nutrient delivery: balanced meals, adequate protein   2. Nutrition education: delivery services  3. Nutrition counseling: provided patient with support and encouragement    Monitoring/Evaluation:    Goals:  1. Check into delivery services for grocery shopping to help promote healthy eating.   2. Check with Dr Naqvi regarding medications for appetite suppression.     Patient to follow up in 2 week(s) with RD        Phone call duration: 20 minutes    Odette Camargo RD        Again, thank you for allowing me to participate in the care of your patient.        Sincerely,        Odette Camargo RD

## 2022-03-29 NOTE — TELEPHONE ENCOUNTER
Refill request for Lamictal. Pt last seen 9/29/21 and was due to be seen after testing. Letter has been sent regarding this need. Will send in 14 day supply with zero refills.     Anni Crawley RN on 3/29/2022 at 8:27 AM

## 2022-03-29 NOTE — PROGRESS NOTES
Marlo Kearns is a 41 year old who is being evaluated via a billable telephone visit.      What phone number would you like to be contacted at? [phone]  How would you like to obtain your AVS? Mail a copy      Medical  Weight Loss Follow-Up Diet Evaluation  Assessment:  Marlo is presenting today for a follow up weight management nutrition consultation. Pt has had an initial appointment with Dr. Naqvi.   Weight loss medication: Victoza-on hold.   Pt's weight is 390 lbs  Initial weight: 370 lbs   Weight change: 20 lbs (up-patient notes that she does have fluid retention in her legs, however appetite has gone up as well)    No flowsheet data found.  BMI: There is no height or weight on file to calculate BMI.  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     Borderline personality disorder (H)     Encephalopathy     Essential hypertension     Intraventricular hemorrhage (H)     Hemiplegia (H)     ACP (advance care planning)     Physical deconditioning     Malnutrition (H)     TBI (traumatic brain injury) (H)     Unspecified episodic mood disorder     Diastolic dysfunction     Seizure disorder (H)     Wound of right foot     Foot infection     Bipolar disorder (H)     Tobacco dependence     Septicemia (H)     S/P coil embolization of cerebral aneurysm     Pulmonary hypertension (H)     PTSD (post-traumatic stress disorder)     CARMENZA (obstructive sleep apnea)     Neuropathy     Morbid obesity with BMI of 60.0-69.9, adult (H)     Obesity hypoventilation syndrome (H)     Microscopic hematuria     Lung nodules     Hyperglycemia     Hyperlipidemia, unspecified hyperlipidemia type      History of methamphetamine abuse (H)     Hemiparesis affecting right side as late effect of cerebrovascular accident (CVA) (H)     Heartburn     History of CVA (cerebrovascular accident)     Chest pain     Anxiety     Arteriovenous malformation of brain     Aphasia as late effect of cerebrovascular accident     Alcohol abuse, episodic     Adjustment disorder with depressed mood     Acute on chronic diastolic CHF (congestive heart failure) (H)     Lymphedema     Chronic pain     Hemiplegia of dominant side as late effect of cerebrovascular disease (H)     Impaired mobility     Mild persistent asthma without complication     Neuropathy of both feet     Shoulder joint pain     Type 2 diabetes mellitus without complication, without long-term current use of insulin (H)     Respiratory failure (H)     Nocturnal hypoxemia     Brain aneurysm     Diabetes: No     Progress on goals from last visit: Patient reports that she has fallen off track recently, noting that she is feeling more hungry and eating larger amounts.  Patient reports that she still receives the MOM's meals once daily, which are Low Na.  Patient reports that she has been trying to keep pop consumption down to very little, consuming more water and coffee instead.  Patient reports that she is feeling defeated, noting that she is stuck in terms of accessing healthy options for meals/snacks at the grocery store since her friend is ill and unable to  her groceries.  Patient notes that her dad can  groceries, but tends to purchase unhealthy foods as well, which makes it difficult to avoid consuming.    Exercise: limited due to lower extremity wounds, stretching exercises, sit-ups     Nutrition Diagnosis:    Overweight/Obesity (NC 3.3) related to overeating and poor lifestyle habits as evidenced by patient's subjective statements and BMI of 71.5 kg/m2.     Intervention:  1. Food and/or nutrient delivery: balanced meals, adequate protein   2. Nutrition  education: delivery services  3. Nutrition counseling: provided patient with support and encouragement    Monitoring/Evaluation:    Goals:  1. Check into delivery services for grocery shopping to help promote healthy eating.   2. Check with Dr Naqvi regarding medications for appetite suppression.     Patient to follow up in 2 week(s) with RD        Phone call duration: 20 minutes    Odette Camargo RD

## 2022-04-09 ENCOUNTER — HEALTH MAINTENANCE LETTER (OUTPATIENT)
Age: 42
End: 2022-04-09

## 2022-04-12 ENCOUNTER — VIRTUAL VISIT (OUTPATIENT)
Dept: SURGERY | Facility: CLINIC | Age: 42
End: 2022-04-12
Payer: MEDICAID

## 2022-04-12 DIAGNOSIS — E66.813 CLASS 3 SEVERE OBESITY DUE TO EXCESS CALORIES WITH SERIOUS COMORBIDITY AND BODY MASS INDEX (BMI) GREATER THAN OR EQUAL TO 70 IN ADULT (H): ICD-10-CM

## 2022-04-12 DIAGNOSIS — E78.5 HYPERLIPIDEMIA, UNSPECIFIED HYPERLIPIDEMIA TYPE: Primary | ICD-10-CM

## 2022-04-12 DIAGNOSIS — E66.01 CLASS 3 SEVERE OBESITY DUE TO EXCESS CALORIES WITH SERIOUS COMORBIDITY AND BODY MASS INDEX (BMI) GREATER THAN OR EQUAL TO 70 IN ADULT (H): ICD-10-CM

## 2022-04-12 DIAGNOSIS — Z71.3 NUTRITIONAL COUNSELING: ICD-10-CM

## 2022-04-12 DIAGNOSIS — I50.33 ACUTE ON CHRONIC DIASTOLIC CHF (CONGESTIVE HEART FAILURE) (H): ICD-10-CM

## 2022-04-12 PROCEDURE — 98968 PH1 ASSMT&MGMT NQHP 21-30: CPT | Performed by: DIETITIAN, REGISTERED

## 2022-04-12 NOTE — PROGRESS NOTES
Marlo Kearns is a 41 year old who is being evaluated via a billable telephone visit.      What phone number would you like to be contacted at? [phone]  How would you like to obtain your AVS? Mail a copy      Medical  Weight Loss Follow-Up Diet Evaluation  Assessment:  Marlo is presenting today for a follow up weight management nutrition consultation. Pt has had an initial appointment with Dr. Naqvi.   Weight loss medication: Victoza-on hold. Hasn't had a chance to discuss with MD at this point.   Pt's weight is 390 lbs  Initial weight: 370 lbs   Weight change: 20 lbs (up-does not fluid retention in her lower extremities)     No flowsheet data found.  BMI: There is no height or weight on file to calculate BMI.  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     Borderline personality disorder (H)     Encephalopathy     Essential hypertension     Intraventricular hemorrhage (H)     Hemiplegia (H)     ACP (advance care planning)     Physical deconditioning     Malnutrition (H)     TBI (traumatic brain injury) (H)     Unspecified episodic mood disorder     Diastolic dysfunction     Seizure disorder (H)     Wound of right foot     Foot infection     Bipolar disorder (H)     Tobacco dependence     Septicemia (H)     S/P coil embolization of cerebral aneurysm     Pulmonary hypertension (H)     PTSD (post-traumatic stress disorder)     CARMENZA (obstructive sleep apnea)     Neuropathy     Morbid obesity with BMI of 60.0-69.9, adult (H)     Obesity hypoventilation syndrome (H)     Microscopic hematuria     Lung nodules     Hyperglycemia     Hyperlipidemia, unspecified hyperlipidemia type      History of methamphetamine abuse (H)     Hemiparesis affecting right side as late effect of cerebrovascular accident (CVA) (H)     Heartburn     History of CVA (cerebrovascular accident)     Chest pain     Anxiety     Arteriovenous malformation of brain     Aphasia as late effect of cerebrovascular accident     Alcohol abuse, episodic     Adjustment disorder with depressed mood     Acute on chronic diastolic CHF (congestive heart failure) (H)     Lymphedema     Chronic pain     Hemiplegia of dominant side as late effect of cerebrovascular disease (H)     Impaired mobility     Mild persistent asthma without complication     Neuropathy of both feet     Shoulder joint pain     Type 2 diabetes mellitus without complication, without long-term current use of insulin (H)     Respiratory failure (H)     Nocturnal hypoxemia     Brain aneurysm     Diabetes: No     Progress on goals from last visit: Patient reports that she has been working on increasing water consumption, however does note to drinking more alcohol recently.  Patient also notes that she has been craving and eating more sweets as well.  Patient reports that she got busy and has not checked into delivery services at this point.  Patient reports that she continues to utilize the MOM's meals to help stay on track, noting that she stick with the Low Na ones.  Patient reports that her wounds seem to be improving on her lower extremities, noting that her heal is looking better however the toe still has a hole in it.    Exercise: limited due to lower extremity wounds, stretching exercises, sit-ups    Nutrition Diagnosis:    Overweight/Obesity (NC 3.3) related to overeating and poor lifestyle habits as evidenced by patient's subjective statements and BMI of 71.5 kg/m2.        Intervention:  1. Food and/or nutrient delivery: balanced meals, adequate protein   2. Nutrition education: protein intake/supplements  3. Nutrition counseling: provided patient with support  and encouragement     Monitoring/Evaluation:    Goals:  1. Work on increased protein throughout the day, aiming for 60-80 grams/day. Trial Protein Shake as meal replacement at breakfast.   2. Work on reducing sweets/high carbohydrate containing foods.     Patient to follow up in 2 week(s) with RD        Phone call duration: 18 minutes    Odette Camargo RD

## 2022-04-15 ENCOUNTER — TELEPHONE (OUTPATIENT)
Dept: NEUROLOGY | Facility: CLINIC | Age: 42
End: 2022-04-15
Payer: MEDICAID

## 2022-04-15 DIAGNOSIS — G40.209 LOCALIZATION-RELATED PARTIAL EPILEPSY WITH COMPLEX PARTIAL SEIZURES (H): ICD-10-CM

## 2022-04-15 RX ORDER — LAMOTRIGINE 150 MG/1
150 TABLET ORAL 2 TIMES DAILY
Qty: 28 TABLET | Refills: 0 | Status: SHIPPED | OUTPATIENT
Start: 2022-04-15 | End: 2022-05-16

## 2022-04-15 NOTE — TELEPHONE ENCOUNTER
Refill request for Lamictal. Pt last seen 9/29/21 and was due to follow up after testing completed. Pt has not completed any of the testing so far. Will send reminder on [facility]. Will also send in 14 day supply with zero refills.     Anni Crawley RN on 4/15/2022 at 2:35 PM

## 2022-04-22 ENCOUNTER — LAB REQUISITION (OUTPATIENT)
Dept: LAB | Facility: CLINIC | Age: 42
End: 2022-04-22
Payer: MEDICAID

## 2022-04-22 DIAGNOSIS — Z03.818 ENCOUNTER FOR OBSERVATION FOR SUSPECTED EXPOSURE TO OTHER BIOLOGICAL AGENTS RULED OUT: ICD-10-CM

## 2022-04-22 PROCEDURE — 87081 CULTURE SCREEN ONLY: CPT | Mod: ORL | Performed by: FAMILY MEDICINE

## 2022-04-24 LAB — BACTERIA SPEC CULT: NORMAL

## 2022-04-26 ENCOUNTER — TELEPHONE (OUTPATIENT)
Dept: SURGERY | Facility: CLINIC | Age: 42
End: 2022-04-26
Payer: MEDICAID

## 2022-04-26 NOTE — TELEPHONE ENCOUNTER
Attempts made to contact patient in regards to phone visit that was scheduled for today with this writer at 3 pm.  Patient did not answer the phone with attempts made, therefore left patient a detailed message including call center contact information to call and reschedule this appointment at her earliest convenience.

## 2022-05-16 ENCOUNTER — TELEPHONE (OUTPATIENT)
Dept: NEUROLOGY | Facility: CLINIC | Age: 42
End: 2022-05-16
Payer: MEDICAID

## 2022-05-16 DIAGNOSIS — G40.209 LOCALIZATION-RELATED PARTIAL EPILEPSY WITH COMPLEX PARTIAL SEIZURES (H): ICD-10-CM

## 2022-05-16 RX ORDER — LAMOTRIGINE 150 MG/1
150 TABLET ORAL 2 TIMES DAILY
Qty: 14 TABLET | Refills: 0 | Status: SHIPPED | OUTPATIENT
Start: 2022-05-16 | End: 2022-06-13

## 2022-05-16 NOTE — TELEPHONE ENCOUNTER
Refill request for Lamictal. Pt last seen 9/29/21 and was due to follow up after testing. Pt has completed all blood work and now needs EEG completed. Pt has been made aware of these needs. Will send in 7 day supply with zero refills.     Anni Crawley RN on 5/16/2022 at 9:45 AM

## 2022-06-01 ENCOUNTER — VIRTUAL VISIT (OUTPATIENT)
Dept: SURGERY | Facility: CLINIC | Age: 42
End: 2022-06-01
Payer: MEDICAID

## 2022-06-01 DIAGNOSIS — E66.01 CLASS 3 SEVERE OBESITY DUE TO EXCESS CALORIES WITH SERIOUS COMORBIDITY AND BODY MASS INDEX (BMI) GREATER THAN OR EQUAL TO 70 IN ADULT (H): ICD-10-CM

## 2022-06-01 DIAGNOSIS — I50.33 ACUTE ON CHRONIC DIASTOLIC CHF (CONGESTIVE HEART FAILURE) (H): ICD-10-CM

## 2022-06-01 DIAGNOSIS — Z71.3 NUTRITIONAL COUNSELING: ICD-10-CM

## 2022-06-01 DIAGNOSIS — E66.813 CLASS 3 SEVERE OBESITY DUE TO EXCESS CALORIES WITH SERIOUS COMORBIDITY AND BODY MASS INDEX (BMI) GREATER THAN OR EQUAL TO 70 IN ADULT (H): ICD-10-CM

## 2022-06-01 DIAGNOSIS — E78.5 HYPERLIPIDEMIA, UNSPECIFIED HYPERLIPIDEMIA TYPE: Primary | ICD-10-CM

## 2022-06-01 PROCEDURE — 98967 PH1 ASSMT&MGMT NQHP 11-20: CPT | Performed by: DIETITIAN, REGISTERED

## 2022-06-01 NOTE — PROGRESS NOTES
Marlo Kearns is a 41 year old who is being evaluated via a billable telephone visit.      What phone number would you like to be contacted at? [phone]  How would you like to obtain your AVS? [facility]      Medical  Weight Loss Follow-Up Diet Evaluation  Assessment:  Marlo is presenting today for a follow up weight management nutrition consultation. Pt has had an initial appointment with Dr. Naqvi.   Weight loss medication: Victoza-on hold for now. .   Pt's weight is 384 lbs   Initial weight: 370 lbs   Weight change: 14 lbs (up, however down 6 lbs since last visit)     No flowsheet data found.  BMI: There is no height or weight on file to calculate BMI.  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     Borderline personality disorder (H)     Encephalopathy     Essential hypertension     Intraventricular hemorrhage (H)     Hemiplegia (H)     ACP (advance care planning)     Physical deconditioning     Malnutrition (H)     TBI (traumatic brain injury) (H)     Unspecified episodic mood disorder     Diastolic dysfunction     Seizure disorder (H)     Wound of right foot     Foot infection     Bipolar disorder (H)     Tobacco dependence     Septicemia (H)     S/P coil embolization of cerebral aneurysm     Pulmonary hypertension (H)     PTSD (post-traumatic stress disorder)     CARMENZA (obstructive sleep apnea)     Neuropathy     Morbid obesity with BMI of 60.0-69.9, adult (H)     Obesity hypoventilation syndrome (H)     Microscopic hematuria     Lung nodules     Hyperglycemia     Hyperlipidemia, unspecified hyperlipidemia type     History of methamphetamine abuse (H)     Hemiparesis  affecting right side as late effect of cerebrovascular accident (CVA) (H)     Heartburn     History of CVA (cerebrovascular accident)     Chest pain     Anxiety     Arteriovenous malformation of brain     Aphasia as late effect of cerebrovascular accident     Alcohol abuse, episodic     Adjustment disorder with depressed mood     Acute on chronic diastolic CHF (congestive heart failure) (H)     Lymphedema     Chronic pain     Hemiplegia of dominant side as late effect of cerebrovascular disease (H)     Impaired mobility     Mild persistent asthma without complication     Neuropathy of both feet     Shoulder joint pain     Type 2 diabetes mellitus without complication, without long-term current use of insulin (H)     Respiratory failure (H)     Nocturnal hypoxemia     Brain aneurysm     Diabetes: No     Progress on goals from last visit: has been a while since last visit, noting that she had been hospitalized with Pneumonia, therefore missed last virtual visit.  Patient reports that she has been advised now to start slowly incorporating exercise back into her routine, noting that she is slowly re-starting this.  Patient reports that she has been experimenting with a Protein Shake as an option for a meal replacement.   Patient reports that she continues to utilize the MOM's meals for lunch or suppers.  Patient reports that she has recently been working on limiting consumption of sweets, noting that it was bad prior to her leaving the hospital, however feels that it is under better control.      Beverages: Water (Is on a Fluid Restriction-1800 mL/day), Coffee, occasional Pop, Vodka-on occasion  Exercise: 3 minutes on the Treadmill 1 time over the past week, somewhat limited due to lower extremity wounds  Chair Yoga a couple times this past couple weeks at 10 minutes (working up to 30 minutes)     Nutrition Diagnosis:    Overweight/Obesity (NC 3.3) related to overeating and poor lifestyle habits as evidenced by  patient's subjective statements (excessive energy intake, lack of exercise) and BMI of 70.4 kg/m2.       Intervention:  1. Food and/or nutrient delivery: balanced meals, adequate protein   2. Nutrition education: protein shakes  3. Nutrition counseling: exercise regimen     Monitoring/Evaluation:    Goals:  1. Continue to work on protein first, aiming for 60-80 grams of protein/day.  Trial of Protein Shake that has between 20-30 grams of protein/serving.   2. Continue to work on reduced consumption of high sugar/carbohydrates foods.   3. Continue to work on increased exercise as able/tolerated.     Patient to follow up in 1 month(s) with RD        Phone call duration: 20 minutes      Odette Camargo RD

## 2022-06-01 NOTE — LETTER
6/1/2022         RE: Marlo Kearns  [address]  [place] [address]        Dear Colleague,    Thank you for referring your patient, Marlo Kearns, to the [facility] [place]. Please see a copy of my visit note below.    Marlo Kearns is a 41 year old who is being evaluated via a billable telephone visit.      What phone number would you like to be contacted at? [phone]  How would you like to obtain your AVS? [facility]      Medical  Weight Loss Follow-Up Diet Evaluation  Assessment:  Marlo is presenting today for a follow up weight management nutrition consultation. Pt has had an initial appointment with Dr. Naqvi.   Weight loss medication: Victoza-on hold for now. .   Pt's weight is 384 lbs   Initial weight: 370 lbs   Weight change: 14 lbs (up, however down 6 lbs since last visit)     No flowsheet data found.  BMI: There is no height or weight on file to calculate BMI.  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     Borderline personality disorder (H)     Encephalopathy     Essential hypertension     Intraventricular hemorrhage (H)     Hemiplegia (H)     ACP (advance care planning)     Physical deconditioning     Malnutrition (H)     TBI (traumatic brain injury) (H)     Unspecified episodic mood disorder     Diastolic dysfunction     Seizure disorder (H)     Wound of right foot     Foot infection     Bipolar disorder (H)     Tobacco dependence     Septicemia (H)     S/P coil embolization of cerebral aneurysm     Pulmonary hypertension (H)     PTSD (post-traumatic stress disorder)     CARMENZA (obstructive sleep apnea)      Neuropathy     Morbid obesity with BMI of 60.0-69.9, adult (H)     Obesity hypoventilation syndrome (H)     Microscopic hematuria     Lung nodules     Hyperglycemia     Hyperlipidemia, unspecified hyperlipidemia type     History of methamphetamine abuse (H)     Hemiparesis affecting right side as late effect of cerebrovascular accident (CVA) (H)     Heartburn     History of CVA (cerebrovascular accident)     Chest pain     Anxiety     Arteriovenous malformation of brain     Aphasia as late effect of cerebrovascular accident     Alcohol abuse, episodic     Adjustment disorder with depressed mood     Acute on chronic diastolic CHF (congestive heart failure) (H)     Lymphedema     Chronic pain     Hemiplegia of dominant side as late effect of cerebrovascular disease (H)     Impaired mobility     Mild persistent asthma without complication     Neuropathy of both feet     Shoulder joint pain     Type 2 diabetes mellitus without complication, without long-term current use of insulin (H)     Respiratory failure (H)     Nocturnal hypoxemia     Brain aneurysm     Diabetes: No     Progress on goals from last visit: has been a while since last visit, noting that she had been hospitalized with Pneumonia, therefore missed last virtual visit.  Patient reports that she has been advised now to start slowly incorporating exercise back into her routine, noting that she is slowly re-starting this.  Patient reports that she has been experimenting with a Protein Shake as an option for a meal replacement.   Patient reports that she continues to utilize the MOM's meals for lunch or suppers.  Patient reports that she has recently been working on limiting consumption of sweets, noting that it was bad prior to her leaving the hospital, however feels that it is under better control.      Beverages: Water (Is on a Fluid Restriction-1800 mL/day), Coffee, occasional Pop, Vodka-on occasion  Exercise: 3 minutes on the Treadmill 1 time over the  past week, somewhat limited due to lower extremity wounds  Chair Yoga a couple times this past couple weeks at 10 minutes (working up to 30 minutes)     Nutrition Diagnosis:    Overweight/Obesity (NC 3.3) related to overeating and poor lifestyle habits as evidenced by patient's subjective statements (excessive energy intake, lack of exercise) and BMI of 70.4 kg/m2.       Intervention:  1. Food and/or nutrient delivery: balanced meals, adequate protein   2. Nutrition education: protein shakes  3. Nutrition counseling: exercise regimen     Monitoring/Evaluation:    Goals:  1. Continue to work on protein first, aiming for 60-80 grams of protein/day.  Trial of Protein Shake that has between 20-30 grams of protein/serving.   2. Continue to work on reduced consumption of high sugar/carbohydrates foods.   3. Continue to work on increased exercise as able/tolerated.     Patient to follow up in 1 month(s) with ALBA        Phone call duration: 20 minutes      Odette Camargo RD        Again, thank you for allowing me to participate in the care of your patient.        Sincerely,        Odette Camargo RD

## 2022-06-03 ENCOUNTER — LAB REQUISITION (OUTPATIENT)
Dept: LAB | Facility: CLINIC | Age: 42
End: 2022-06-03
Payer: MEDICAID

## 2022-06-03 DIAGNOSIS — Z03.818 ENCOUNTER FOR OBSERVATION FOR SUSPECTED EXPOSURE TO OTHER BIOLOGICAL AGENTS RULED OUT: ICD-10-CM

## 2022-06-03 PROCEDURE — U0003 INFECTIOUS AGENT DETECTION BY NUCLEIC ACID (DNA OR RNA); SEVERE ACUTE RESPIRATORY SYNDROME CORONAVIRUS 2 (SARS-COV-2) (CORONAVIRUS DISEASE [COVID-19]), AMPLIFIED PROBE TECHNIQUE, MAKING USE OF HIGH THROUGHPUT TECHNOLOGIES AS DESCRIBED BY CMS-2020-01-R: HCPCS | Mod: ORL | Performed by: FAMILY MEDICINE

## 2022-06-04 LAB — SARS-COV-2 RNA RESP QL NAA+PROBE: NEGATIVE

## 2022-06-13 DIAGNOSIS — G40.209 LOCALIZATION-RELATED PARTIAL EPILEPSY WITH COMPLEX PARTIAL SEIZURES (H): ICD-10-CM

## 2022-06-13 RX ORDER — LAMOTRIGINE 150 MG/1
150 TABLET ORAL 2 TIMES DAILY
Qty: 14 TABLET | Refills: 0 | Status: SHIPPED | OUTPATIENT
Start: 2022-06-13 | End: 2022-07-13

## 2022-06-13 NOTE — TELEPHONE ENCOUNTER
Refill request for Lamictal. Pt last seen /29/21 and was due to follow up after testing. Pt has completed all blood work and now needs EEG completed. Pt has been made aware of these needs. Pt appears to not be fully compliant with the Lamictal. Will have Dr. Retana review and reorder.    Medication T'd for review and signature    Anni Crawley RN on 6/13/2022 at 1:17 PM

## 2022-06-27 ENCOUNTER — VIRTUAL VISIT (OUTPATIENT)
Dept: SURGERY | Facility: CLINIC | Age: 42
End: 2022-06-27
Payer: MEDICAID

## 2022-06-27 DIAGNOSIS — I50.33 ACUTE ON CHRONIC DIASTOLIC CHF (CONGESTIVE HEART FAILURE) (H): ICD-10-CM

## 2022-06-27 DIAGNOSIS — Z71.3 NUTRITIONAL COUNSELING: ICD-10-CM

## 2022-06-27 DIAGNOSIS — E66.813 CLASS 3 SEVERE OBESITY DUE TO EXCESS CALORIES WITH SERIOUS COMORBIDITY AND BODY MASS INDEX (BMI) GREATER THAN OR EQUAL TO 70 IN ADULT (H): ICD-10-CM

## 2022-06-27 DIAGNOSIS — E78.5 HYPERLIPIDEMIA, UNSPECIFIED HYPERLIPIDEMIA TYPE: Primary | ICD-10-CM

## 2022-06-27 DIAGNOSIS — E66.01 CLASS 3 SEVERE OBESITY DUE TO EXCESS CALORIES WITH SERIOUS COMORBIDITY AND BODY MASS INDEX (BMI) GREATER THAN OR EQUAL TO 70 IN ADULT (H): ICD-10-CM

## 2022-06-27 PROCEDURE — 98967 PH1 ASSMT&MGMT NQHP 11-20: CPT | Performed by: DIETITIAN, REGISTERED

## 2022-06-27 NOTE — PROGRESS NOTES
Marlo Kearns is a 41 year old who is being evaluated via a billable telephone visit.      What phone number would you like to be contacted at? [phone]  How would you like to obtain your AVS? [facility]    Medical  Weight Loss Follow-Up Diet Evaluation  Assessment:  Marlo is presenting today for a follow up weight management nutrition consultation. Pt has had an initial appointment with Dr. Naqvi.   Weight loss medication: Victoza-on hold for now. .   Pt's weight is 384 lbs  Initial weight: 370 lbs   Weight change: 14 lbs (stable over the past month)     No flowsheet data found.  BMI: There is no height or weight on file to calculate BMI.  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     Borderline personality disorder (H)     Encephalopathy     Essential hypertension     Intraventricular hemorrhage (H)     Hemiplegia (H)     ACP (advance care planning)     Physical deconditioning     Malnutrition (H)     TBI (traumatic brain injury) (H)     Unspecified episodic mood disorder     Diastolic dysfunction     Seizure disorder (H)     Wound of right foot     Foot infection     Bipolar disorder (H)     Tobacco dependence     Septicemia (H)     S/P coil embolization of cerebral aneurysm     Pulmonary hypertension (H)     PTSD (post-traumatic stress disorder)     CARMENZA (obstructive sleep apnea)     Neuropathy     Morbid obesity with BMI of 60.0-69.9, adult (H)     Obesity hypoventilation syndrome (H)     Microscopic hematuria     Lung nodules     Hyperglycemia     Hyperlipidemia, unspecified hyperlipidemia type     History of methamphetamine abuse (H)     Hemiparesis affecting right  side as late effect of cerebrovascular accident (CVA) (H)     Heartburn     History of CVA (cerebrovascular accident)     Chest pain     Anxiety     Arteriovenous malformation of brain     Aphasia as late effect of cerebrovascular accident     Alcohol abuse, episodic     Adjustment disorder with depressed mood     Acute on chronic diastolic CHF (congestive heart failure) (H)     Lymphedema     Chronic pain     Hemiplegia of dominant side as late effect of cerebrovascular disease (H)     Impaired mobility     Mild persistent asthma without complication     Neuropathy of both feet     Shoulder joint pain     Type 2 diabetes mellitus without complication, without long-term current use of insulin (H)     Respiratory failure (H)     Nocturnal hypoxemia     Brain aneurysm     Diabetes: No     Progress on goals from last visit: Patient reports that she just got qualified for protein shakes/meal replacement, noting that she has been approved for 2 per day.   Patient reports that she has not been as diligent in regards to protein, however has been less more recently.  Patient reports that she continues to receive MOM's meals 1 time/day, noting that she sometimes only will eat once daily.     Beverages: Water, Diet Pop (switched from liters to cans), Coffee  Exercise: limited more recently due to wound issues, seeing weekly now (was on the treadmill)   Chair Yoga/Leg and Arm Exercises/Stomach Exercises-shooting for weekly     Nutrition Diagnosis:    Overweight/Obesity (NC 3.3) related to overeating and poor lifestyle habits as evidenced by patient's subjective statements (excessive energy intake, lack of exercise) and BMI of 70.4 kg/m2.       Intervention:  1. Food and/or nutrient delivery: balanced meals, adequate protein   2. Nutrition education: protein intake/shakes  3. Nutrition counseling: exercise regimen     Monitoring/Evaluation:    Goals:  1. Continue to focus on protein first at each meal, focusing on 20-30 grams  of protein/meal, 3 meals/day.   2. Exercise as able/tolerated pending foot issue.     Patient to follow up in 2 week(s) with RD      Phone call duration: 19 minutes      Odette Camargo RD

## 2022-06-27 NOTE — LETTER
6/27/2022         RE: Marlo Kearns  [address]  [place] [address]        Dear Colleague,    Thank you for referring your patient, Marlo Kearns, to the [facility] [place]. Please see a copy of my visit note below.    Marlo Kearns is a 41 year old who is being evaluated via a billable telephone visit.      What phone number would you like to be contacted at? [phone]  How would you like to obtain your AVS? [facility]    Medical  Weight Loss Follow-Up Diet Evaluation  Assessment:  Marlo is presenting today for a follow up weight management nutrition consultation. Pt has had an initial appointment with Dr. Naqvi.   Weight loss medication: Victoza-on hold for now. .   Pt's weight is 384 lbs  Initial weight: 370 lbs   Weight change: 14 lbs (stable over the past month)     No flowsheet data found.  BMI: There is no height or weight on file to calculate BMI.  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     Borderline personality disorder (H)     Encephalopathy     Essential hypertension     Intraventricular hemorrhage (H)     Hemiplegia (H)     ACP (advance care planning)     Physical deconditioning     Malnutrition (H)     TBI (traumatic brain injury) (H)     Unspecified episodic mood disorder     Diastolic dysfunction     Seizure disorder (H)     Wound of right foot     Foot infection     Bipolar disorder (H)     Tobacco dependence     Septicemia (H)     S/P coil embolization of cerebral aneurysm     Pulmonary hypertension (H)     PTSD (post-traumatic stress disorder)     CARMENZA (obstructive sleep apnea)     Neuropathy      Morbid obesity with BMI of 60.0-69.9, adult (H)     Obesity hypoventilation syndrome (H)     Microscopic hematuria     Lung nodules     Hyperglycemia     Hyperlipidemia, unspecified hyperlipidemia type     History of methamphetamine abuse (H)     Hemiparesis affecting right side as late effect of cerebrovascular accident (CVA) (H)     Heartburn     History of CVA (cerebrovascular accident)     Chest pain     Anxiety     Arteriovenous malformation of brain     Aphasia as late effect of cerebrovascular accident     Alcohol abuse, episodic     Adjustment disorder with depressed mood     Acute on chronic diastolic CHF (congestive heart failure) (H)     Lymphedema     Chronic pain     Hemiplegia of dominant side as late effect of cerebrovascular disease (H)     Impaired mobility     Mild persistent asthma without complication     Neuropathy of both feet     Shoulder joint pain     Type 2 diabetes mellitus without complication, without long-term current use of insulin (H)     Respiratory failure (H)     Nocturnal hypoxemia     Brain aneurysm     Diabetes: No     Progress on goals from last visit: Patient reports that she just got qualified for protein shakes/meal replacement, noting that she has been approved for 2 per day.   Patient reports that she has not been as diligent in regards to protein, however has been less more recently.  Patient reports that she continues to receive MOM's meals 1 time/day, noting that she sometimes only will eat once daily.     Beverages: Water, Diet Pop (switched from liters to cans), Coffee  Exercise: limited more recently due to wound issues, seeing weekly now (was on the treadmill)   Chair Yoga/Leg and Arm Exercises/Stomach Exercises-shooting for weekly     Nutrition Diagnosis:    Overweight/Obesity (NC 3.3) related to overeating and poor lifestyle habits as evidenced by patient's subjective statements (excessive energy intake, lack of exercise) and BMI of 70.4 kg/m2.        Intervention:  1. Food and/or nutrient delivery: balanced meals, adequate protein   2. Nutrition education: protein intake/shakes  3. Nutrition counseling: exercise regimen     Monitoring/Evaluation:    Goals:  1. Continue to focus on protein first at each meal, focusing on 20-30 grams of protein/meal, 3 meals/day.   2. Exercise as able/tolerated pending foot issue.     Patient to follow up in 2 week(s) with ALBA      Phone call duration: 19 minutes      Odette Camargo RD        Again, thank you for allowing me to participate in the care of your patient.        Sincerely,        Odette Camargo RD

## 2022-07-11 ENCOUNTER — VIRTUAL VISIT (OUTPATIENT)
Dept: SURGERY | Facility: CLINIC | Age: 42
End: 2022-07-11
Payer: MEDICAID

## 2022-07-11 DIAGNOSIS — I50.33 ACUTE ON CHRONIC DIASTOLIC CHF (CONGESTIVE HEART FAILURE) (H): ICD-10-CM

## 2022-07-11 DIAGNOSIS — E66.813 CLASS 3 SEVERE OBESITY DUE TO EXCESS CALORIES WITH SERIOUS COMORBIDITY AND BODY MASS INDEX (BMI) GREATER THAN OR EQUAL TO 70 IN ADULT (H): ICD-10-CM

## 2022-07-11 DIAGNOSIS — E78.5 HYPERLIPIDEMIA, UNSPECIFIED HYPERLIPIDEMIA TYPE: Primary | ICD-10-CM

## 2022-07-11 DIAGNOSIS — Z71.3 NUTRITIONAL COUNSELING: ICD-10-CM

## 2022-07-11 DIAGNOSIS — E66.01 CLASS 3 SEVERE OBESITY DUE TO EXCESS CALORIES WITH SERIOUS COMORBIDITY AND BODY MASS INDEX (BMI) GREATER THAN OR EQUAL TO 70 IN ADULT (H): ICD-10-CM

## 2022-07-11 PROCEDURE — 98967 PH1 ASSMT&MGMT NQHP 11-20: CPT | Performed by: DIETITIAN, REGISTERED

## 2022-07-11 NOTE — LETTER
7/11/2022         RE: Marlo Kearns  [address]  [place] [address]        Dear Colleague,    Thank you for referring your patient, Marlo Kearns, to the [facility] [place]. Please see a copy of my visit note below.    Marlo Kearns is a 41 year old who is being evaluated via a billable telephone visit.      What phone number would you like to be contacted at? [phone]  How would you like to obtain your AVS? [facility]      Medical  Weight Loss Follow-Up Diet Evaluation  Assessment:  Marlo is presenting today for a follow up weight management nutrition consultation. Pt has had an initial appointment with Dr. Naqvi.   Weight loss medication: Victoza-on hold for now .   Pt's weight is 387 lbs   Initial weight: 370 lbs   Weight change: 17 lbs (up)     No flowsheet data found.  BMI: There is no height or weight on file to calculate BMI.  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     Borderline personality disorder (H)     Encephalopathy     Essential hypertension     Intraventricular hemorrhage (H)     Hemiplegia (H)     ACP (advance care planning)     Physical deconditioning     Malnutrition (H)     TBI (traumatic brain injury) (H)     Unspecified episodic mood disorder     Diastolic dysfunction     Seizure disorder (H)     Wound of right foot     Foot infection     Bipolar disorder (H)     Tobacco dependence     Septicemia (H)     S/P coil embolization of cerebral aneurysm     Pulmonary hypertension (H)     PTSD (post-traumatic stress disorder)     CARMENZA (obstructive sleep apnea)     Neuropathy     Morbid obesity with BMI  of 60.0-69.9, adult (H)     Obesity hypoventilation syndrome (H)     Microscopic hematuria     Lung nodules     Hyperglycemia     Hyperlipidemia, unspecified hyperlipidemia type     History of methamphetamine abuse (H)     Hemiparesis affecting right side as late effect of cerebrovascular accident (CVA) (H)     Heartburn     History of CVA (cerebrovascular accident)     Chest pain     Anxiety     Arteriovenous malformation of brain     Aphasia as late effect of cerebrovascular accident     Alcohol abuse, episodic     Adjustment disorder with depressed mood     Acute on chronic diastolic CHF (congestive heart failure) (H)     Lymphedema     Chronic pain     Hemiplegia of dominant side as late effect of cerebrovascular disease (H)     Impaired mobility     Mild persistent asthma without complication     Neuropathy of both feet     Shoulder joint pain     Type 2 diabetes mellitus without complication, without long-term current use of insulin (H)     Respiratory failure (H)     Nocturnal hypoxemia     Brain aneurysm     Diabetes: No     Progress on goals from last visit: Has been trying to increase her fruit and vegetable consumption.  Patient reports that she feels she hasn't been eating as much lately either.  Patient continues to receive the Low Na MOM's meals.  Patient reports that she is trying to watch her sodium consumption, noting that her cousin has been helping her be more mindful in regards to this.      Dietary Recall:  Breakfast: skipped, needs to get back on track with   Lunch:MOM's meals or meat, potato, green beans   Dinner:MOM's meals or meat, potato, green beans (typically the opposite of lunch)   Typical snacks: banana or grapes or orange  Beverages: Water, Coffee, Diet Pop   Exercise: Leg Lifts, Sit Ups shooting for a couple times weekly (limited due to   Nutrition Diagnosis:    Overweight/Obesity (NC 3.3) related to overeating and poor lifestyle habits as evidenced by patient's subjective statements  (excessive energy intake, lack of exercise) and BMI of 70.9 kg/m2.     Intervention:  1. Food and/or nutrient delivery: balanced meals, adequate protein   2. Nutrition education: portion sizes, consistency of meals   3. Nutrition counseling: provided support and encouragement    Monitoring/Evaluation:    Goals:  1. Be consistent with eating breakfast daily.   2. Continue to work on portion control at meals (9 inch plate-1/2 veggies, 1/4 protein, 1/4 starch).  3. Exercise as able/tolerated.     Patient to follow up in 2 week(s) with RD        Phone call duration: 13 minutes      Odette Camargo RD        Again, thank you for allowing me to participate in the care of your patient.        Sincerely,        Odette Camargo RD

## 2022-07-11 NOTE — PROGRESS NOTES
Marlo Kearns is a 41 year old who is being evaluated via a billable telephone visit.      What phone number would you like to be contacted at? [phone]  How would you like to obtain your AVS? [facility]      Medical  Weight Loss Follow-Up Diet Evaluation  Assessment:  Marlo is presenting today for a follow up weight management nutrition consultation. Pt has had an initial appointment with Dr. Naqvi.   Weight loss medication: Victoza-on hold for now .   Pt's weight is 387 lbs   Initial weight: 370 lbs   Weight change: 17 lbs (up)     No flowsheet data found.  BMI: There is no height or weight on file to calculate BMI.  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     Borderline personality disorder (H)     Encephalopathy     Essential hypertension     Intraventricular hemorrhage (H)     Hemiplegia (H)     ACP (advance care planning)     Physical deconditioning     Malnutrition (H)     TBI (traumatic brain injury) (H)     Unspecified episodic mood disorder     Diastolic dysfunction     Seizure disorder (H)     Wound of right foot     Foot infection     Bipolar disorder (H)     Tobacco dependence     Septicemia (H)     S/P coil embolization of cerebral aneurysm     Pulmonary hypertension (H)     PTSD (post-traumatic stress disorder)     CARMENZA (obstructive sleep apnea)     Neuropathy     Morbid obesity with BMI of 60.0-69.9, adult (H)     Obesity hypoventilation syndrome (H)     Microscopic hematuria     Lung nodules     Hyperglycemia     Hyperlipidemia, unspecified hyperlipidemia type     History of methamphetamine abuse (H)     Hemiparesis affecting right side as late effect of  cerebrovascular accident (CVA) (H)     Heartburn     History of CVA (cerebrovascular accident)     Chest pain     Anxiety     Arteriovenous malformation of brain     Aphasia as late effect of cerebrovascular accident     Alcohol abuse, episodic     Adjustment disorder with depressed mood     Acute on chronic diastolic CHF (congestive heart failure) (H)     Lymphedema     Chronic pain     Hemiplegia of dominant side as late effect of cerebrovascular disease (H)     Impaired mobility     Mild persistent asthma without complication     Neuropathy of both feet     Shoulder joint pain     Type 2 diabetes mellitus without complication, without long-term current use of insulin (H)     Respiratory failure (H)     Nocturnal hypoxemia     Brain aneurysm     Diabetes: No     Progress on goals from last visit: Has been trying to increase her fruit and vegetable consumption.  Patient reports that she feels she hasn't been eating as much lately either.  Patient continues to receive the Low Na MOM's meals.  Patient reports that she is trying to watch her sodium consumption, noting that her cousin has been helping her be more mindful in regards to this.      Dietary Recall:  Breakfast: skipped, needs to get back on track with   Lunch:MOM's meals or meat, potato, green beans   Dinner:MOM's meals or meat, potato, green beans (typically the opposite of lunch)   Typical snacks: banana or grapes or orange  Beverages: Water, Coffee, Diet Pop   Exercise: Leg Lifts, Sit Ups shooting for a couple times weekly (limited due to   Nutrition Diagnosis:    Overweight/Obesity (NC 3.3) related to overeating and poor lifestyle habits as evidenced by patient's subjective statements (excessive energy intake, lack of exercise) and BMI of 70.9 kg/m2.     Intervention:  1. Food and/or nutrient delivery: balanced meals, adequate protein   2. Nutrition education: portion sizes, consistency of meals   3. Nutrition counseling: provided support and  encouragement    Monitoring/Evaluation:    Goals:  1. Be consistent with eating breakfast daily.   2. Continue to work on portion control at meals (9 inch plate-1/2 veggies, 1/4 protein, 1/4 starch).  3. Exercise as able/tolerated.     Patient to follow up in 2 week(s) with RD        Phone call duration: 13 minutes      Odette Camargo RD

## 2022-07-13 DIAGNOSIS — G40.209 LOCALIZATION-RELATED PARTIAL EPILEPSY WITH COMPLEX PARTIAL SEIZURES (H): ICD-10-CM

## 2022-07-13 RX ORDER — LAMOTRIGINE 150 MG/1
150 TABLET ORAL 2 TIMES DAILY
Qty: 6 TABLET | Refills: 0 | Status: SHIPPED | OUTPATIENT
Start: 2022-07-13

## 2022-07-13 NOTE — TELEPHONE ENCOUNTER
Refill request for Lamictal. Pt last seen 9/29/21 and was due to follow up after testing. Pt has completed all blood work and now needs EEG completed. Pt has been made aware of these needs. Pt appears to not be fully compliant with the Lamictal. Last refill was on 6/13/22 for a 7 day supply. Will have Dr. Retana review and reorder.    Medication T'd for review and signature    Anni Crawley RN on 7/13/2022 at 9:47 AM

## 2022-07-25 ENCOUNTER — TELEPHONE (OUTPATIENT)
Dept: SURGERY | Facility: CLINIC | Age: 42
End: 2022-07-25

## 2022-07-25 NOTE — TELEPHONE ENCOUNTER
Attempts made to contact patient in regards to phone visit that was scheduled for today with this writer at 9:30 am.  Patient did not answer the phone with attempts made, therefore left patient a detailed message including call center contact information to call and reschedule this appointment at her earliest convenience.

## 2022-07-30 ENCOUNTER — HEALTH MAINTENANCE LETTER (OUTPATIENT)
Age: 42
End: 2022-07-30

## 2022-10-07 DIAGNOSIS — G40.909 SEIZURE DISORDER (H): Primary | ICD-10-CM

## 2022-10-07 NOTE — LETTER
October 10, 2022      Marlo Kearns  [address] [place] [address]        Dear Marlo,     We recently provided you with medication refills.   Many medications require routine follow-up with your doctor.     Your prescription(s) have been refilled for 30 days so you may have time for the above noted follow-up.  Please call [phone] to schedule soon so we can assure you have an appointment before your next refills are needed.  If you have already made a follow up appointment, please disregard this letter.        Sincerely,    [facility]

## 2022-10-09 ENCOUNTER — HEALTH MAINTENANCE LETTER (OUTPATIENT)
Age: 42
End: 2022-10-09

## 2022-10-10 RX ORDER — LEVETIRACETAM 750 MG/1
750 TABLET ORAL 2 TIMES DAILY
Qty: 60 TABLET | Refills: 0 | Status: SHIPPED | OUTPATIENT
Start: 2022-10-10 | End: 2022-11-17

## 2022-10-10 NOTE — TELEPHONE ENCOUNTER
Pending Prescriptions:                       Disp   Refills    levETIRAcetam (KEPPRA) 750 MG tablet [Pha*60 tab*             Sig: TAKE 1 TABLET BY MOUTH TWICE A DAY    LOV: 9/29/21    NOV: not scheduled, letter sent    Per LOV note: -     levETIRAcetam (KEPPRA) 750 MG tablet; Take 1 tablet (750 mg) by mouth 2 times daily    Refill: Approved for 30 days, letter sent asking pt to schedule f/u.     Gideon Constantino, RN, BSN  [facility]

## 2022-10-17 ENCOUNTER — TELEPHONE (OUTPATIENT)
Dept: NEUROLOGY | Facility: CLINIC | Age: 42
End: 2022-10-17

## 2022-10-17 NOTE — TELEPHONE ENCOUNTER
[facility] Call Center    Phone Message    May a detailed message be left on voicemail: yes     Reason for Call: Other: Pt got a letter stating that she needed to make a f/u appt in order to get medication refill.  Writer scheduled next available appt on 2/17/2022 Pt stated that she will be out of medication by then. Please call Pt back at [phone]     Action Taken: Message routed to:  Other: [facility]    Travel Screening: Not Applicable

## 2022-10-18 NOTE — TELEPHONE ENCOUNTER
BETTYM informing Marlo that we will continue medication refills until scheduled f/u appt.     Gideon Constantino RN, BSN  [facility]

## 2022-11-17 DIAGNOSIS — G40.909 SEIZURE DISORDER (H): ICD-10-CM

## 2022-11-17 RX ORDER — LEVETIRACETAM 750 MG/1
750 TABLET ORAL 2 TIMES DAILY
Qty: 60 TABLET | Refills: 3 | Status: SHIPPED | OUTPATIENT
Start: 2022-11-17 | End: 2023-03-15

## 2022-11-17 NOTE — TELEPHONE ENCOUNTER
Refill request for: levetiracetam 750mg   Directions: Take one tablet by mouth twice daily.    LOV: 09/28/21  NOV: 02/17/23    30 day supply with 3 refills Medication T'd for review and signature    Glenys Nieves LPN on 11/17/2022 at 11:09 AM

## 2022-11-26 ENCOUNTER — HEALTH MAINTENANCE LETTER (OUTPATIENT)
Age: 42
End: 2022-11-26

## 2023-01-01 ENCOUNTER — VIRTUAL VISIT (OUTPATIENT)
Dept: SURGERY | Facility: CLINIC | Age: 43
End: 2023-01-01
Payer: MEDICAID

## 2023-01-01 ENCOUNTER — LAB REQUISITION (OUTPATIENT)
Dept: LAB | Facility: CLINIC | Age: 43
End: 2023-01-01
Payer: MEDICAID

## 2023-01-01 ENCOUNTER — HEALTH MAINTENANCE LETTER (OUTPATIENT)
Age: 43
End: 2023-01-01

## 2023-01-01 DIAGNOSIS — E11.9 TYPE 2 DIABETES MELLITUS WITHOUT COMPLICATION, WITHOUT LONG-TERM CURRENT USE OF INSULIN (H): Primary | ICD-10-CM

## 2023-01-01 DIAGNOSIS — E11.9 TYPE 2 DIABETES MELLITUS WITHOUT COMPLICATION, WITHOUT LONG-TERM CURRENT USE OF INSULIN (H): ICD-10-CM

## 2023-01-01 DIAGNOSIS — Z71.3 NUTRITIONAL COUNSELING: ICD-10-CM

## 2023-01-01 DIAGNOSIS — E66.01 CLASS 3 SEVERE OBESITY DUE TO EXCESS CALORIES WITH SERIOUS COMORBIDITY AND BODY MASS INDEX (BMI) GREATER THAN OR EQUAL TO 70 IN ADULT (H): ICD-10-CM

## 2023-01-01 DIAGNOSIS — E78.5 HYPERLIPIDEMIA, UNSPECIFIED: ICD-10-CM

## 2023-01-01 DIAGNOSIS — E78.5 HYPERLIPIDEMIA, UNSPECIFIED HYPERLIPIDEMIA TYPE: Primary | ICD-10-CM

## 2023-01-01 DIAGNOSIS — E66.813 CLASS 3 SEVERE OBESITY DUE TO EXCESS CALORIES WITH SERIOUS COMORBIDITY AND BODY MASS INDEX (BMI) GREATER THAN OR EQUAL TO 70 IN ADULT (H): ICD-10-CM

## 2023-01-01 DIAGNOSIS — I11.0 HYPERTENSIVE HEART DISEASE WITH HEART FAILURE (H): ICD-10-CM

## 2023-01-01 DIAGNOSIS — I50.33 ACUTE ON CHRONIC DIASTOLIC CHF (CONGESTIVE HEART FAILURE) (H): ICD-10-CM

## 2023-01-01 DIAGNOSIS — E78.5 HYPERLIPIDEMIA, UNSPECIFIED HYPERLIPIDEMIA TYPE: ICD-10-CM

## 2023-01-01 DIAGNOSIS — E66.813 CLASS 3 SEVERE OBESITY DUE TO EXCESS CALORIES WITH SERIOUS COMORBIDITY AND BODY MASS INDEX (BMI) OF 60.0 TO 69.9 IN ADULT (H): ICD-10-CM

## 2023-01-01 DIAGNOSIS — E66.01 CLASS 3 SEVERE OBESITY DUE TO EXCESS CALORIES WITH SERIOUS COMORBIDITY AND BODY MASS INDEX (BMI) OF 60.0 TO 69.9 IN ADULT (H): ICD-10-CM

## 2023-01-01 DIAGNOSIS — G40.909 SEIZURE DISORDER (H): ICD-10-CM

## 2023-01-01 LAB
ALBUMIN SERPL BCG-MCNC: 4.2 G/DL (ref 3.5–5.2)
ALP SERPL-CCNC: 88 U/L (ref 35–104)
ALT SERPL W P-5'-P-CCNC: 39 U/L (ref 0–50)
ANION GAP SERPL CALCULATED.3IONS-SCNC: 15 MMOL/L (ref 7–15)
AST SERPL W P-5'-P-CCNC: 52 U/L (ref 0–45)
BILIRUB SERPL-MCNC: 0.6 MG/DL
BUN SERPL-MCNC: 10.8 MG/DL (ref 6–20)
CALCIUM SERPL-MCNC: 9.4 MG/DL (ref 8.6–10)
CHLORIDE SERPL-SCNC: 87 MMOL/L (ref 98–107)
CHOLEST SERPL-MCNC: 175 MG/DL
CREAT SERPL-MCNC: 0.68 MG/DL (ref 0.51–0.95)
DEPRECATED HCO3 PLAS-SCNC: 37 MMOL/L (ref 22–29)
GFR SERPL CREATININE-BSD FRML MDRD: >90 ML/MIN/1.73M2
GLUCOSE SERPL-MCNC: 205 MG/DL (ref 70–99)
HDLC SERPL-MCNC: 41 MG/DL
LDLC SERPL CALC-MCNC: 92 MG/DL
NONHDLC SERPL-MCNC: 134 MG/DL
POTASSIUM SERPL-SCNC: 4 MMOL/L (ref 3.4–5.3)
PROT SERPL-MCNC: 7.3 G/DL (ref 6.4–8.3)
SODIUM SERPL-SCNC: 139 MMOL/L (ref 136–145)
TRIGL SERPL-MCNC: 210 MG/DL

## 2023-01-01 PROCEDURE — 97803 MED NUTRITION INDIV SUBSEQ: CPT | Mod: 93 | Performed by: DIETITIAN, REGISTERED

## 2023-01-01 PROCEDURE — 80053 COMPREHEN METABOLIC PANEL: CPT | Mod: ORL | Performed by: NURSE PRACTITIONER

## 2023-01-01 PROCEDURE — 80061 LIPID PANEL: CPT | Mod: ORL | Performed by: NURSE PRACTITIONER

## 2023-01-01 RX ORDER — LEVETIRACETAM 750 MG/1
750 TABLET ORAL 2 TIMES DAILY
Qty: 60 TABLET | Refills: 2 | Status: SHIPPED | OUTPATIENT
Start: 2023-01-01 | End: 2023-01-01

## 2023-01-01 RX ORDER — LEVETIRACETAM 750 MG/1
750 TABLET ORAL 2 TIMES DAILY
Qty: 60 TABLET | Refills: 2 | Status: SHIPPED | OUTPATIENT
Start: 2023-01-01 | End: 2024-01-01

## 2023-01-24 ENCOUNTER — VIRTUAL VISIT (OUTPATIENT)
Dept: SURGERY | Facility: CLINIC | Age: 43
End: 2023-01-24
Payer: MEDICAID

## 2023-01-24 DIAGNOSIS — E78.5 HYPERLIPIDEMIA, UNSPECIFIED HYPERLIPIDEMIA TYPE: Primary | ICD-10-CM

## 2023-01-24 DIAGNOSIS — Z71.3 NUTRITIONAL COUNSELING: ICD-10-CM

## 2023-01-24 DIAGNOSIS — E66.01 CLASS 3 SEVERE OBESITY DUE TO EXCESS CALORIES WITH SERIOUS COMORBIDITY AND BODY MASS INDEX (BMI) GREATER THAN OR EQUAL TO 70 IN ADULT (H): ICD-10-CM

## 2023-01-24 DIAGNOSIS — I50.33 ACUTE ON CHRONIC DIASTOLIC CHF (CONGESTIVE HEART FAILURE) (H): ICD-10-CM

## 2023-01-24 DIAGNOSIS — E66.813 CLASS 3 SEVERE OBESITY DUE TO EXCESS CALORIES WITH SERIOUS COMORBIDITY AND BODY MASS INDEX (BMI) GREATER THAN OR EQUAL TO 70 IN ADULT (H): ICD-10-CM

## 2023-01-24 PROCEDURE — 97803 MED NUTRITION INDIV SUBSEQ: CPT | Mod: 95 | Performed by: DIETITIAN, REGISTERED

## 2023-01-24 NOTE — PROGRESS NOTES
Marlo Kearns is a 42 year old who is being evaluated via a billable telephone visit.      What phone number would you like to be contacted at? [phone]   How would you like to obtain your AVS? [facility]  [phone]}      Medical  Weight Loss Follow-Up Diet Evaluation  Assessment:  Marlo is presenting today for a follow up weight management nutrition consultation. Pt has had an initial appointment with Dr. Naqvi.   Weight loss medication: Victoza-on hold for now.   Pt's weight is 390-400 lbs (fluctuates, due to increased swelling)   Initial weight: 370 lbs     No flowsheet data found.  BMI: There is no height or weight on file to calculate BMI.  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     Borderline personality disorder (H)     Encephalopathy     Essential hypertension     Intraventricular hemorrhage (H)     Hemiplegia (H)     ACP (advance care planning)     Physical deconditioning     Malnutrition (H)     TBI (traumatic brain injury)     Unspecified episodic mood disorder     Diastolic dysfunction     Seizure disorder (H)     Wound of right foot     Foot infection     Bipolar disorder (H)     Tobacco dependence     Septicemia (H)     S/P coil embolization of cerebral aneurysm     Pulmonary hypertension (H)     PTSD (post-traumatic stress disorder)     CARMENZA (obstructive sleep apnea)     Neuropathy     Morbid obesity with BMI of 60.0-69.9, adult (H)     Obesity hypoventilation syndrome (H)     Microscopic hematuria     Lung nodules     Hyperglycemia     Hyperlipidemia, unspecified hyperlipidemia type     History of methamphetamine abuse (H)     Hemiparesis affecting right side as  late effect of cerebrovascular accident (CVA) (H)     Heartburn     History of CVA (cerebrovascular accident)     Chest pain     Anxiety     Arteriovenous malformation of brain     Aphasia as late effect of cerebrovascular accident     Alcohol abuse, episodic     Adjustment disorder with depressed mood     Acute on chronic diastolic CHF (congestive heart failure) (H)     Lymphedema     Chronic pain     Hemiplegia of dominant side as late effect of cerebrovascular disease (H)     Impaired mobility     Mild persistent asthma without complication     Neuropathy of both feet     Shoulder joint pain     Type 2 diabetes mellitus without complication, without long-term current use of insulin (H)     Respiratory failure (H)     Nocturnal hypoxemia     Brain aneurysm     Diabetes: No     Progress on goals from last visit: still struggling with lower extremity wounds along with increased swelling in her feet.  Continue to receive the MOW's, noting that she hasn't been eating them as often, noting that she is getting tired of them.  Patient reports that she continues to try and stick to a Low Na Diet.  Patient does have a friend that is willing to  groceries for her.     Dietary Recall:  Breakfast: skipped or Ensure   Lunch:skipped or salad or fruit   Dinner:MOW or something her dad may fix    Typical snacks: on occasion-fruit (grapes) or chips  Beverages: Coffee, Pop or Diet Pop, minimal water (prefers bottled water)  Exercise: nothing recently     Nutrition Diagnosis:    Overweight/Obesity (NC 3.3) related to overeating and poor lifestyle habits as evidenced by patient's subjective statements (skipped meals, excessive energy intake, lack of exercise) and BMI of 71.5 kg/m2.     Intervention:  1. Food and/or nutrient delivery: balanced meals, adequate protein   2. Nutrition education: Anti-Inflammatory Diet  3. Nutrition counseling: provided support and encouragement    Monitoring/Evaluation:    Goals:  1. Focus on  eating 3 small meals/day (avoid skipping).   2. Continue to focus on low sodium diet to help prevent swelling.       Patient to follow up in 2 week(s) with RD        Phone call duration: 18 minutes      Originating Location (pt. Location): Home        Distant Location (provider location):  Off-site      Odette Camargo RD

## 2023-01-24 NOTE — LETTER
1/24/2023         RE: Marlo Kearns  [address]  [place] [address]        Dear Colleague,    Thank you for referring your patient, Marlo Kearns, to the [facility] [place]. Please see a copy of my visit note below.    Marlo Kearns is a 42 year old who is being evaluated via a billable telephone visit.      What phone number would you like to be contacted at? [phone]   How would you like to obtain your AVS? MyChart  [phone]}      Medical  Weight Loss Follow-Up Diet Evaluation  Assessment:  Marlo is presenting today for a follow up weight management nutrition consultation. Pt has had an initial appointment with Dr. Naqvi.   Weight loss medication: Victoza-on hold for now.   Pt's weight is 390-400 lbs (fluctuates, due to increased swelling)   Initial weight: 370 lbs     No flowsheet data found.  BMI: There is no height or weight on file to calculate BMI.  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     Borderline personality disorder (H)     Encephalopathy     Essential hypertension     Intraventricular hemorrhage (H)     Hemiplegia (H)     ACP (advance care planning)     Physical deconditioning     Malnutrition (H)     TBI (traumatic brain injury)     Unspecified episodic mood disorder     Diastolic dysfunction     Seizure disorder (H)     Wound of right foot     Foot infection     Bipolar disorder (H)     Tobacco dependence     Septicemia (H)     S/P coil embolization of cerebral aneurysm     Pulmonary hypertension (H)     PTSD (post-traumatic stress disorder)     CARMENZA (obstructive sleep apnea)     Neuropathy     Morbid  obesity with BMI of 60.0-69.9, adult (H)     Obesity hypoventilation syndrome (H)     Microscopic hematuria     Lung nodules     Hyperglycemia     Hyperlipidemia, unspecified hyperlipidemia type     History of methamphetamine abuse (H)     Hemiparesis affecting right side as late effect of cerebrovascular accident (CVA) (H)     Heartburn     History of CVA (cerebrovascular accident)     Chest pain     Anxiety     Arteriovenous malformation of brain     Aphasia as late effect of cerebrovascular accident     Alcohol abuse, episodic     Adjustment disorder with depressed mood     Acute on chronic diastolic CHF (congestive heart failure) (H)     Lymphedema     Chronic pain     Hemiplegia of dominant side as late effect of cerebrovascular disease (H)     Impaired mobility     Mild persistent asthma without complication     Neuropathy of both feet     Shoulder joint pain     Type 2 diabetes mellitus without complication, without long-term current use of insulin (H)     Respiratory failure (H)     Nocturnal hypoxemia     Brain aneurysm     Diabetes: No     Progress on goals from last visit: still struggling with lower extremity wounds along with increased swelling in her feet.  Continue to receive the MOW's, noting that she hasn't been eating them as often, noting that she is getting tired of them.  Patient reports that she continues to try and stick to a Low Na Diet.  Patient does have a friend that is willing to  groceries for her.     Dietary Recall:  Breakfast: skipped or Ensure   Lunch:skipped or salad or fruit   Dinner:MOW or something her dad may fix    Typical snacks: on occasion-fruit (grapes) or chips  Beverages: Coffee, Pop or Diet Pop, minimal water (prefers bottled water)  Exercise: nothing recently     Nutrition Diagnosis:    Overweight/Obesity (NC 3.3) related to overeating and poor lifestyle habits as evidenced by patient's subjective statements (skipped meals, excessive energy intake, lack of  exercise) and BMI of 71.5 kg/m2.     Intervention:  1. Food and/or nutrient delivery: balanced meals, adequate protein   2. Nutrition education: Anti-Inflammatory Diet  3. Nutrition counseling: provided support and encouragement    Monitoring/Evaluation:    Goals:  1. Focus on eating 3 small meals/day (avoid skipping).   2. Continue to focus on low sodium diet to help prevent swelling.       Patient to follow up in 2 week(s) with RD        Phone call duration: 18 minutes      Originating Location (pt. Location): Home        Distant Location (provider location):  Off-site      Odette Camargo RD            Again, thank you for allowing me to participate in the care of your patient.        Sincerely,        Odette Camargo RD

## 2023-02-07 ENCOUNTER — VIRTUAL VISIT (OUTPATIENT)
Dept: SURGERY | Facility: CLINIC | Age: 43
End: 2023-02-07
Payer: MEDICAID

## 2023-02-07 DIAGNOSIS — E78.5 HYPERLIPIDEMIA, UNSPECIFIED HYPERLIPIDEMIA TYPE: Primary | ICD-10-CM

## 2023-02-07 DIAGNOSIS — E66.01 CLASS 3 SEVERE OBESITY DUE TO EXCESS CALORIES WITH SERIOUS COMORBIDITY AND BODY MASS INDEX (BMI) GREATER THAN OR EQUAL TO 70 IN ADULT (H): ICD-10-CM

## 2023-02-07 DIAGNOSIS — Z71.3 NUTRITIONAL COUNSELING: ICD-10-CM

## 2023-02-07 DIAGNOSIS — E66.813 CLASS 3 SEVERE OBESITY DUE TO EXCESS CALORIES WITH SERIOUS COMORBIDITY AND BODY MASS INDEX (BMI) GREATER THAN OR EQUAL TO 70 IN ADULT (H): ICD-10-CM

## 2023-02-07 DIAGNOSIS — I50.33 ACUTE ON CHRONIC DIASTOLIC CHF (CONGESTIVE HEART FAILURE) (H): ICD-10-CM

## 2023-02-07 PROCEDURE — 97803 MED NUTRITION INDIV SUBSEQ: CPT | Mod: 95 | Performed by: DIETITIAN, REGISTERED

## 2023-02-07 NOTE — LETTER
2/7/2023         RE: Marlo Kearns  [address]  [place] [address]        Dear Colleague,    Thank you for referring your patient, Marlo Kearns, to the [facility] [place]. Please see a copy of my visit note below.    Marlo Kearns is a 42 year old who is being evaluated via a billable telephone visit.      What phone number would you like to be contacted at? [phone]  How would you like to obtain your AVS? [facility]    Medical  Weight Loss Follow-Up Diet Evaluation  Assessment:  Marlo is presenting today for a follow up weight management nutrition consultation. Pt has had an initial appointment with Dr. Naqvi.   Pt's weight is 390-400 lbs   Initial weight: 370 lbs  Weight change: consistent compared to 2 weeks ago    No flowsheet data found.  BMI: There is no height or weight on file to calculate BMI.  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     Borderline personality disorder (H)     Encephalopathy     Essential hypertension     Intraventricular hemorrhage (H)     Hemiplegia (H)     ACP (advance care planning)     Physical deconditioning     Malnutrition (H)     TBI (traumatic brain injury)     Unspecified episodic mood disorder     Diastolic dysfunction     Seizure disorder (H)     Wound of right foot     Foot infection     Bipolar disorder (H)     Tobacco dependence     Septicemia (H)     S/P coil embolization of cerebral aneurysm     Pulmonary hypertension (H)     PTSD (post-traumatic stress disorder)     CARMENZA (obstructive sleep apnea)     Neuropathy     Morbid obesity with BMI of 60.0-69.9, adult (H)     Obesity  hypoventilation syndrome (H)     Microscopic hematuria     Lung nodules     Hyperglycemia     Hyperlipidemia, unspecified hyperlipidemia type     History of methamphetamine abuse (H)     Hemiparesis affecting right side as late effect of cerebrovascular accident (CVA) (H)     Heartburn     History of CVA (cerebrovascular accident)     Chest pain     Anxiety     Arteriovenous malformation of brain     Aphasia as late effect of cerebrovascular accident     Alcohol abuse, episodic     Adjustment disorder with depressed mood     Acute on chronic diastolic CHF (congestive heart failure) (H)     Lymphedema     Chronic pain     Hemiplegia of dominant side as late effect of cerebrovascular disease (H)     Impaired mobility     Mild persistent asthma without complication     Neuropathy of both feet     Shoulder joint pain     Type 2 diabetes mellitus without complication, without long-term current use of insulin (H)     Respiratory failure (H)     Nocturnal hypoxemia     Brain aneurysm     Diabetes: No     Progress on goals from last visit: has been doing more microwave meals lately due to stove not working, hoping to receive today.  Continues to try to follow a Low Na Diet, receiving MOW that provides a Low Na Option.  Patient reports that she continues to try and remain consistent with taking the Ensure on a daily basis in regards to breakfast.      Beverages: Pop/Diet Pop, Has been working on increased water consumption, coffee  Exercise: hoping to get her new shoes soon so that she can start walking on the treadmill    Nutrition Diagnosis:    Overweight/Obesity (NC 3.3) related to overeating and poor lifestyle habits as evidenced by patient's subjective statements (skipped meals, excessive energy intake, lack of exercise) and BMI of 71.5 kg/m2.    Intervention:  1. Food and/or nutrient delivery: balanced meals, adequate protein   2. Nutrition education: none   3. Nutrition counseling: provided support and  encouragement    Monitoring/Evaluation:    Goals:  1. Start small with walking on the treadmill for exercise, short duration a couple times/week to start.   2. Continue to follow Low Na Diet for prevention of fluid retention.  3. Continue to focus on consuming 3 small meals/day.     Patient to follow up in 3 week(s) with RD        Phone call duration: 8 minutes    Originating Location (pt. Location): Home        Distant Location (provider location):  Off-site    Mode of Communication:  Video Conference via [facility]    Physician has received verbal consent for a Video Visit from the patient? Yes      Odette Camargo RD            Again, thank you for allowing me to participate in the care of your patient.        Sincerely,        Odette Camargo RD

## 2023-02-07 NOTE — PROGRESS NOTES
Marlo Kearns is a 42 year old who is being evaluated via a billable telephone visit.      What phone number would you like to be contacted at? [phone]  How would you like to obtain your AVS? [facility]    Medical  Weight Loss Follow-Up Diet Evaluation  Assessment:  Marlo is presenting today for a follow up weight management nutrition consultation. Pt has had an initial appointment with Dr. Naqvi.   Pt's weight is 390-400 lbs   Initial weight: 370 lbs  Weight change: consistent compared to 2 weeks ago    No flowsheet data found.  BMI: There is no height or weight on file to calculate BMI.  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     Borderline personality disorder (H)     Encephalopathy     Essential hypertension     Intraventricular hemorrhage (H)     Hemiplegia (H)     ACP (advance care planning)     Physical deconditioning     Malnutrition (H)     TBI (traumatic brain injury)     Unspecified episodic mood disorder     Diastolic dysfunction     Seizure disorder (H)     Wound of right foot     Foot infection     Bipolar disorder (H)     Tobacco dependence     Septicemia (H)     S/P coil embolization of cerebral aneurysm     Pulmonary hypertension (H)     PTSD (post-traumatic stress disorder)     CARMENZA (obstructive sleep apnea)     Neuropathy     Morbid obesity with BMI of 60.0-69.9, adult (H)     Obesity hypoventilation syndrome (H)     Microscopic hematuria     Lung nodules     Hyperglycemia     Hyperlipidemia, unspecified hyperlipidemia type     History of methamphetamine abuse (H)     Hemiparesis affecting right side as late effect of cerebrovascular accident (CVA) (H)      Heartburn     History of CVA (cerebrovascular accident)     Chest pain     Anxiety     Arteriovenous malformation of brain     Aphasia as late effect of cerebrovascular accident     Alcohol abuse, episodic     Adjustment disorder with depressed mood     Acute on chronic diastolic CHF (congestive heart failure) (H)     Lymphedema     Chronic pain     Hemiplegia of dominant side as late effect of cerebrovascular disease (H)     Impaired mobility     Mild persistent asthma without complication     Neuropathy of both feet     Shoulder joint pain     Type 2 diabetes mellitus without complication, without long-term current use of insulin (H)     Respiratory failure (H)     Nocturnal hypoxemia     Brain aneurysm     Diabetes: No     Progress on goals from last visit: has been doing more microwave meals lately due to stove not working, hoping to receive today.  Continues to try to follow a Low Na Diet, receiving MOW that provides a Low Na Option.  Patient reports that she continues to try and remain consistent with taking the Ensure on a daily basis in regards to breakfast.      Beverages: Pop/Diet Pop, Has been working on increased water consumption, coffee  Exercise: hoping to get her new shoes soon so that she can start walking on the treadmill    Nutrition Diagnosis:    Overweight/Obesity (NC 3.3) related to overeating and poor lifestyle habits as evidenced by patient's subjective statements (skipped meals, excessive energy intake, lack of exercise) and BMI of 71.5 kg/m2.    Intervention:  1. Food and/or nutrient delivery: balanced meals, adequate protein   2. Nutrition education: none   3. Nutrition counseling: provided support and encouragement    Monitoring/Evaluation:    Goals:  1. Start small with walking on the treadmill for exercise, short duration a couple times/week to start.   2. Continue to follow Low Na Diet for prevention of fluid retention.  3. Continue to focus on consuming 3 small meals/day.      Patient to follow up in 3 week(s) with RD        Phone call duration: 8 minutes    Originating Location (pt. Location): Home        Distant Location (provider location):  Off-site    Mode of Communication:  Video Conference via [facility]    Physician has received verbal consent for a Video Visit from the patient? Yes      Odette Camargo RD

## 2023-03-07 ENCOUNTER — VIRTUAL VISIT (OUTPATIENT)
Dept: SURGERY | Facility: CLINIC | Age: 43
End: 2023-03-07
Payer: MEDICAID

## 2023-03-07 DIAGNOSIS — I50.33 ACUTE ON CHRONIC DIASTOLIC CHF (CONGESTIVE HEART FAILURE) (H): ICD-10-CM

## 2023-03-07 DIAGNOSIS — Z71.3 NUTRITIONAL COUNSELING: ICD-10-CM

## 2023-03-07 DIAGNOSIS — E66.01 CLASS 3 SEVERE OBESITY DUE TO EXCESS CALORIES WITH SERIOUS COMORBIDITY AND BODY MASS INDEX (BMI) GREATER THAN OR EQUAL TO 70 IN ADULT (H): ICD-10-CM

## 2023-03-07 DIAGNOSIS — E66.813 CLASS 3 SEVERE OBESITY DUE TO EXCESS CALORIES WITH SERIOUS COMORBIDITY AND BODY MASS INDEX (BMI) GREATER THAN OR EQUAL TO 70 IN ADULT (H): ICD-10-CM

## 2023-03-07 DIAGNOSIS — E78.5 HYPERLIPIDEMIA, UNSPECIFIED HYPERLIPIDEMIA TYPE: Primary | ICD-10-CM

## 2023-03-07 PROCEDURE — 97803 MED NUTRITION INDIV SUBSEQ: CPT | Mod: 93 | Performed by: DIETITIAN, REGISTERED

## 2023-03-07 NOTE — LETTER
3/7/2023         RE: Marlo Kearns  [address]  [place] [address]        Dear Colleague,    Thank you for referring your patient, Marlo Kearns, to the [facility] [place]. Please see a copy of my visit note below.    Marlo Kearns is a 42 year old who is being evaluated via a billable telephone visit.      What phone number would you like to be contacted at? [phone]  How would you like to obtain your AVS? [facility]      Medical  Weight Loss Follow-Up Diet Evaluation  Assessment:  Marlo is presenting today for a follow up weight management nutrition consultation. Pt has had an initial appointment with Dr. Naqvi.   Pt's weight is 390-400 lbs   Initial weight: 370 lbs   Weight change: no change since last check in    No flowsheet data found.  BMI: There is no height or weight on file to calculate BMI.  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     Borderline personality disorder (H)     Encephalopathy     Essential hypertension     Intraventricular hemorrhage (H)     Hemiplegia (H)     ACP (advance care planning)     Physical deconditioning     Malnutrition (H)     TBI (traumatic brain injury)     Unspecified episodic mood disorder     Diastolic dysfunction     Seizure disorder (H)     Wound of right foot     Foot infection     Bipolar disorder (H)     Tobacco dependence     Septicemia (H)     S/P coil embolization of cerebral aneurysm     Pulmonary hypertension (H)     PTSD (post-traumatic stress disorder)     CARMENZA (obstructive sleep apnea)     Neuropathy     Morbid obesity with BMI of 60.0-69.9, adult (H)     Obesity  hypoventilation syndrome (H)     Microscopic hematuria     Lung nodules     Hyperglycemia     Hyperlipidemia, unspecified hyperlipidemia type     History of methamphetamine abuse (H)     Hemiparesis affecting right side as late effect of cerebrovascular accident (CVA) (H)     Heartburn     History of CVA (cerebrovascular accident)     Chest pain     Anxiety     Arteriovenous malformation of brain     Aphasia as late effect of cerebrovascular accident     Alcohol abuse, episodic     Adjustment disorder with depressed mood     Acute on chronic diastolic CHF (congestive heart failure) (H)     Lymphedema     Chronic pain     Hemiplegia of dominant side as late effect of cerebrovascular disease (H)     Impaired mobility     Mild persistent asthma without complication     Neuropathy of both feet     Shoulder joint pain     Type 2 diabetes mellitus without complication, without long-term current use of insulin (H)     Respiratory failure (H)     Nocturnal hypoxemia     Brain aneurysm     Diabetes: No     Progress on goals from last visit: Feels that she is eating better these last couple of weeks.  Patient reports that she is utilizing her MOW's more often along with trying to incorporate more meat into her diet.  Had to stop taking the Ensure due to financial constraints.  Patient reports that she has been trying to increase her water consumption.  Patient reports that she also consumes Coffee and Reg or Diet Pop on a regular basis as well.     Exercise: has to get her soles on her shoes fixed prior to wearing, then hoping to start walking on the treadmill    Nutrition Diagnosis:    Overweight/Obesity (NC 3.3) related to overeating and poor lifestyle habits as evidenced by patient's subjective statements (skipped meals, excessive energy intake, lack of exercise)and BMI of 71.5 kg/m2.    Intervention:  1. Food and/or nutrient delivery: balanced meals, adequate protein   2. Nutrition education: pop consumption    3. Nutrition counseling: provided support and encouragement    Monitoring/Evaluation:    Goals:  1. Work on avoidance of regular pop.   2. Once shoes are fixed, start slowly incorporating exercise as able/tolerated.   3. Continue to follow a Low Na Diet.     Patient to follow up in 1 month(s) with RD        Phone call duration: 10 minutes      Originating Location (pt. Location): Home        Distant Location (provider location):  Off-site      Odette Camargo RD            Again, thank you for allowing me to participate in the care of your patient.        Sincerely,        Odette Camargo RD

## 2023-03-07 NOTE — PROGRESS NOTES
Marlo Kearns is a 42 year old who is being evaluated via a billable telephone visit.      What phone number would you like to be contacted at? [phone]  How would you like to obtain your AVS? [facility]      Medical  Weight Loss Follow-Up Diet Evaluation  Assessment:  Marlo is presenting today for a follow up weight management nutrition consultation. Pt has had an initial appointment with Dr. Naqvi.   Pt's weight is 390-400 lbs   Initial weight: 370 lbs   Weight change: no change since last check in    No flowsheet data found.  BMI: There is no height or weight on file to calculate BMI.  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     Borderline personality disorder (H)     Encephalopathy     Essential hypertension     Intraventricular hemorrhage (H)     Hemiplegia (H)     ACP (advance care planning)     Physical deconditioning     Malnutrition (H)     TBI (traumatic brain injury)     Unspecified episodic mood disorder     Diastolic dysfunction     Seizure disorder (H)     Wound of right foot     Foot infection     Bipolar disorder (H)     Tobacco dependence     Septicemia (H)     S/P coil embolization of cerebral aneurysm     Pulmonary hypertension (H)     PTSD (post-traumatic stress disorder)     CARMENZA (obstructive sleep apnea)     Neuropathy     Morbid obesity with BMI of 60.0-69.9, adult (H)     Obesity hypoventilation syndrome (H)     Microscopic hematuria     Lung nodules     Hyperglycemia     Hyperlipidemia, unspecified hyperlipidemia type     History of methamphetamine abuse (H)     Hemiparesis affecting right side as late effect of cerebrovascular accident (CVA) (H)      Heartburn     History of CVA (cerebrovascular accident)     Chest pain     Anxiety     Arteriovenous malformation of brain     Aphasia as late effect of cerebrovascular accident     Alcohol abuse, episodic     Adjustment disorder with depressed mood     Acute on chronic diastolic CHF (congestive heart failure) (H)     Lymphedema     Chronic pain     Hemiplegia of dominant side as late effect of cerebrovascular disease (H)     Impaired mobility     Mild persistent asthma without complication     Neuropathy of both feet     Shoulder joint pain     Type 2 diabetes mellitus without complication, without long-term current use of insulin (H)     Respiratory failure (H)     Nocturnal hypoxemia     Brain aneurysm     Diabetes: No     Progress on goals from last visit: Feels that she is eating better these last couple of weeks.  Patient reports that she is utilizing her MOW's more often along with trying to incorporate more meat into her diet.  Had to stop taking the Ensure due to financial constraints.  Patient reports that she has been trying to increase her water consumption.  Patient reports that she also consumes Coffee and Reg or Diet Pop on a regular basis as well.     Exercise: has to get her soles on her shoes fixed prior to wearing, then hoping to start walking on the treadmill    Nutrition Diagnosis:    Overweight/Obesity (NC 3.3) related to overeating and poor lifestyle habits as evidenced by patient's subjective statements (skipped meals, excessive energy intake, lack of exercise)and BMI of 71.5 kg/m2.    Intervention:  1. Food and/or nutrient delivery: balanced meals, adequate protein   2. Nutrition education: pop consumption   3. Nutrition counseling: provided support and encouragement    Monitoring/Evaluation:    Goals:  1. Work on avoidance of regular pop.   2. Once shoes are fixed, start slowly incorporating exercise as able/tolerated.   3. Continue to follow a Low Na Diet.     Patient to follow up in 1  month(s) with RD        Phone call duration: 10 minutes      Originating Location (pt. Location): Home        Distant Location (provider location):  Off-site      Odette Camargo RD

## 2023-03-15 DIAGNOSIS — G40.909 SEIZURE DISORDER (H): ICD-10-CM

## 2023-03-15 RX ORDER — LEVETIRACETAM 750 MG/1
750 TABLET ORAL 2 TIMES DAILY
Qty: 60 TABLET | Refills: 3 | Status: SHIPPED | OUTPATIENT
Start: 2023-03-15 | End: 2023-01-01

## 2023-03-15 NOTE — TELEPHONE ENCOUNTER
Refill request for: Keppra    Directions: Take 1 tablet 2 times daily     LOV: 9/29/21  NOV: 6/6/23    30 day supply with 3 refills Medication T'd for review and signature

## 2023-03-21 NOTE — PROGRESS NOTES
Marlo Kearns is a 41 year old who is being evaluated via a billable telephone visit.      What phone number would you like to be contacted at? [phone]  How would you like to obtain your AVS? [facility]      Medical  Weight Loss Follow-Up Diet Evaluation  Assessment:  Marlo is presenting today for a follow up weight management nutrition consultation. Pt has had an initial appointment with Dr. Naqvi.   Weight loss medication: Victoza-still on hold.   Pt's weight is 373 lbs   Initial weight: 370 lbs   Weight change: down another 2 lbs since last visit     No flowsheet data found.  BMI: There is no height or weight on file to calculate BMI.  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     Borderline personality disorder (H)     Encephalopathy     Essential hypertension     Intraventricular hemorrhage (H)     Hemiplegia (H)     ACP (advance care planning)     Physical deconditioning     Malnutrition (H)     TBI (traumatic brain injury) (H)     Unspecified episodic mood disorder     Diastolic dysfunction     Seizure disorder (H)     Wound of right foot     Foot infection     Bipolar disorder (H)     Tobacco dependence     Septicemia (H)     S/P coil embolization of cerebral aneurysm     Pulmonary hypertension (H)     PTSD (post-traumatic stress disorder)     CARMENZA (obstructive sleep apnea)     Neuropathy     Morbid obesity with BMI of 60.0-69.9, adult (H)     Obesity hypoventilation syndrome (H)     Microscopic hematuria     Lung nodules     Hyperglycemia     Hyperlipidemia, unspecified hyperlipidemia type     History of methamphetamine abuse (H)     Hemiparesis affecting right side as  late effect of cerebrovascular accident (CVA) (H)     Heartburn     History of CVA (cerebrovascular accident)     Chest pain     Anxiety     Arteriovenous malformation of brain     Aphasia as late effect of cerebrovascular accident     Alcohol abuse, episodic     Adjustment disorder with depressed mood     Acute on chronic diastolic CHF (congestive heart failure) (H)     Lymphedema     Chronic pain     Hemiplegia of dominant side as late effect of cerebrovascular disease (H)     Impaired mobility     Mild persistent asthma without complication     Neuropathy of both feet     Shoulder joint pain     Type 2 diabetes mellitus without complication, without long-term current use of insulin (H)     Respiratory failure (H)     Nocturnal hypoxemia     Brain aneurysm     Diabetes: No     Progress on goals from last visit: Trying to focus on protein first along with non starchy vegetables at suppers along with still receiving the MOM's meals that are low sodium 1 time/day.  Patient reports that she is trying to be mindful regarding carbohydrate consumption, trying to limit her portion sizes.  Patient reports that she is dealing with some depression, therefore trying to do the best she can in terms of eating healthy.  Patient reports that she has been either consuming grapes or greek yogurt as a snack option.  Patient reports that she is trying to incorporate more water into her diet, noting that she has been consuming more Coke and Pepsi these days.    Exercise: more strengthening exercises with arms, legs, and abdomen.  Patient reports that she is still limited in terms of being laid up due to lower extremities wound issues.     Nutrition Diagnosis:    Overweight/Obesity (NC 3.3) related to overeating and poor lifestyle habits as evidenced by patient's subjective statements and BMI of 68.3 kg/m2.     Intervention:  1. Food and/or nutrient delivery: balanced meals, adequate protein   2. Nutrition education: calorie containing  beverages  3. Nutrition counseling: exercise regimen, provided patient with support and encouragement      Monitoring/Evaluation:    Goals:  1. Continue to work on elimination of calorie containing beverages, focusing more on increased water consumption.   2. Continue to follow a Low Na Diet.   3. Continue to increase exercise as able/tolerated, pending wound issues.     Patient to follow up in 2 week(s) with RD      Phone call duration: 17 minutes      Odette Camargo RD     Detail Level: Simple Detail Level: Zone

## 2023-03-25 ENCOUNTER — HEALTH MAINTENANCE LETTER (OUTPATIENT)
Age: 43
End: 2023-03-25

## 2023-04-03 ENCOUNTER — TELEPHONE (OUTPATIENT)
Dept: SURGERY | Facility: CLINIC | Age: 43
End: 2023-04-03

## 2023-04-03 NOTE — TELEPHONE ENCOUNTER
Attempts made to contact patient in regards to video visit that was scheduled for today with this writer at 2 pm.  Patient did not link onto video visit nor answer the phone with attempts made, therefore left patient a detailed message including call center contact information to call and reschedule this appointment at her earliest convenience.

## 2023-05-02 ENCOUNTER — TELEPHONE (OUTPATIENT)
Dept: SURGERY | Facility: CLINIC | Age: 43
End: 2023-05-02

## 2023-05-02 NOTE — TELEPHONE ENCOUNTER
Attempts made to contact patient in regards to phone visit that was scheduled for today with this writer at 9 am.  Patient did not answer the phone with attempts made, therefore left patient a detailed message including call center contact information to call and reschedule this appointment at her earliest convenience.

## 2023-07-17 NOTE — TELEPHONE ENCOUNTER
Please advise on pended Rx for patients Levetiracetam 750 mg 1 tab bid # 60.    They have not been seen since 9/29/2021, and have multiple cancellations for recent scheduled appointments with you.    Please sign or decline as appropriate.    Bert Simmons, RN, BSN  [facility]

## 2023-07-17 NOTE — TELEPHONE ENCOUNTER
Reina sent a refill request for Levetiracetam 750 mg 1 tab bid # 60. Pt has cancelled last two visits. Maybe give 1/2 refill until appt is made. Could possibly see Demetria.

## 2023-10-24 NOTE — TELEPHONE ENCOUNTER
Pending Prescriptions:                       Disp   Refills    levETIRAcetam (KEPPRA) 750 MG tablet      60 tab*2            Sig: Take 1 tablet (750 mg) by mouth 2 times daily    Dr. Ye can you sign below refill for Keppra? Pt last seen in 2021 but has appt in Feb. Pended rx is for 30 day supply with 2 refills.     Gideon Constantino, RN, BSN  [facility]

## 2023-10-24 NOTE — TELEPHONE ENCOUNTER
[facility] Call Center    Phone Message    May a detailed message be left on voicemail: yes     Reason for Call: Medication Refill Request    Has the patient contacted the pharmacy for the refill? Yes   Name of medication being requested: Keppra   750 mg 2 x times day  Provider who prescribed the medication: MARIUM Retana  Pharmacy: [place], [place]  Date medication is needed: ASAP   Patient out of medication     Action Taken: [facility]    Travel Screening: Not Applicable

## 2023-11-20 NOTE — PROGRESS NOTES
Marlo Kearns is a 43 year old who is being evaluated via a billable telephone visit.      What phone number would you like to be contacted at? [phone]  How would you like to obtain your AVS? [facility]      Medical  Weight Loss Follow-Up Diet Evaluation  Assessment:  Marlo is presenting today for a follow up weight management nutrition consultation.  This patient has had an initial appointment and was referred by Dr. Naqvi  for MNT as treatment for Obesity which is impacting er Diabetes.     Pt's weight is 390-400 lbs   Initial weight: 370 lbs           No data to display              BMI: There is no height or weight on file to calculate BMI.  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    Borderline personality disorder (H)    Encephalopathy    Essential hypertension    Intraventricular hemorrhage (H)    Hemiplegia (H)    ACP (advance care planning)    Physical deconditioning    Malnutrition (H24)    TBI (traumatic brain injury) (H)    Unspecified episodic mood disorder    Diastolic dysfunction    Seizure disorder (H)    Wound of right foot    Foot infection    Bipolar disorder (H)    Tobacco dependence    Septicemia (H)    S/P coil embolization of cerebral aneurysm    Pulmonary hypertension (H)    PTSD (post-traumatic stress disorder)    CARMENZA (obstructive sleep apnea)    Neuropathy    Morbid obesity with BMI of 60.0-69.9, adult (H)    Obesity hypoventilation syndrome (H)    Microscopic hematuria    Lung nodules    Hyperglycemia    Hyperlipidemia, unspecified hyperlipidemia type    History of methamphetamine abuse (H)    Hemiparesis affecting right side as late effect of  cerebrovascular accident (CVA) (H)    Heartburn    History of CVA (cerebrovascular accident)    Chest pain    Anxiety    Arteriovenous malformation of brain    Aphasia as late effect of cerebrovascular accident    Alcohol abuse, episodic    Adjustment disorder with depressed mood    Acute on chronic diastolic CHF (congestive heart failure) (H)    Lymphedema    Chronic pain    Hemiplegia of dominant side as late effect of cerebrovascular disease (H)    Impaired mobility    Mild persistent asthma without complication    Neuropathy of both feet    Shoulder joint pain    Type 2 diabetes mellitus without complication, without long-term current use of insulin (H)    Respiratory failure (H)    Nocturnal hypoxemia    Brain aneurysm     Diabetes: Yes-Metformin 2 pills in the evening    Progress on goals from last visit: Continues to receive MOW's.  Patient reports that she does still struggle with weight fluctuations, noting that in regards to her eating habits, weekends tend to be a struggle, noting that she tends to eat out of boredom.  Patient reports since our last visit, she has been diagnosed with Diabetes and has been starting on the Metformin, which has controlled her appetite.  Patient reports that she is trying to be consistent with eating breakfast on a regular basis.  Has switched from regular pop to diet pop, noting that she isn't consuming quite as much as she had in the past and also is trying to drink more water in addition.      Exercise: Just recently obtaining new shoes for her feet, hoping to start utilizing the treadmill next week    Nutrition Diagnosis:    Obesity (NC 3.3) related to overeating and poor lifestyle habits as evidenced by patient's subjective statements and BMI of 71.5 kg/m2.       Intervention:  Food and/or nutrient delivery: balanced meals, adequate protein   Nutrition education: goal setting   Nutrition counseling: provided support and  encouragement    Monitoring/Evaluation:    Goals:  Obtain a more recent weight.   Try coloring instead of eating out of boredom, especially on the weekends.   Start walking on the treadmill, start small and slowly build.     Patient to follow up in 2 week(s) with RD        Phone call duration: 13 minutes        Originating Location (pt. Location): Home      Distant Location (provider location):  Off-site      Odette Camargo RD

## 2023-11-20 NOTE — LETTER
11/20/2023         RE: Marlo Kearns  [address]  [place] [address]        Dear Colleague,    Thank you for referring your patient, Marlo Kearns, to the [facility]ATRICS [facility] [place]. Please see a copy of my visit note below.    Marlo Kearns is a 43 year old who is being evaluated via a billable telephone visit.      What phone number would you like to be contacted at? [phone]  How would you like to obtain your AVS? [facility]      Medical  Weight Loss Follow-Up Diet Evaluation  Assessment:  Marlo is presenting today for a follow up weight management nutrition consultation.  This patient has had an initial appointment and was referred by Dr. Naqvi  for MNT as treatment for Obesity which is impacting er Diabetes.     Pt's weight is 390-400 lbs   Initial weight: 370 lbs           No data to display              BMI: There is no height or weight on file to calculate BMI.  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     Borderline personality disorder (H)     Encephalopathy     Essential hypertension     Intraventricular hemorrhage (H)     Hemiplegia (H)     ACP (advance care planning)     Physical deconditioning     Malnutrition (H24)     TBI (traumatic brain injury) (H)     Unspecified episodic mood disorder     Diastolic dysfunction     Seizure disorder (H)     Wound of right foot     Foot infection     Bipolar disorder (H)     Tobacco dependence     Septicemia (H)     S/P coil embolization of cerebral aneurysm     Pulmonary hypertension (H)     PTSD (post-traumatic stress disorder)     CARMENZA (obstructive sleep apnea)     Neuropathy      Morbid obesity with BMI of 60.0-69.9, adult (H)     Obesity hypoventilation syndrome (H)     Microscopic hematuria     Lung nodules     Hyperglycemia     Hyperlipidemia, unspecified hyperlipidemia type     History of methamphetamine abuse (H)     Hemiparesis affecting right side as late effect of cerebrovascular accident (CVA) (H)     Heartburn     History of CVA (cerebrovascular accident)     Chest pain     Anxiety     Arteriovenous malformation of brain     Aphasia as late effect of cerebrovascular accident     Alcohol abuse, episodic     Adjustment disorder with depressed mood     Acute on chronic diastolic CHF (congestive heart failure) (H)     Lymphedema     Chronic pain     Hemiplegia of dominant side as late effect of cerebrovascular disease (H)     Impaired mobility     Mild persistent asthma without complication     Neuropathy of both feet     Shoulder joint pain     Type 2 diabetes mellitus without complication, without long-term current use of insulin (H)     Respiratory failure (H)     Nocturnal hypoxemia     Brain aneurysm     Diabetes: Yes-Metformin 2 pills in the evening    Progress on goals from last visit: Continues to receive MOW's.  Patient reports that she does still struggle with weight fluctuations, noting that in regards to her eating habits, weekends tend to be a struggle, noting that she tends to eat out of boredom.  Patient reports since our last visit, she has been diagnosed with Diabetes and has been starting on the Metformin, which has controlled her appetite.  Patient reports that she is trying to be consistent with eating breakfast on a regular basis.  Has switched from regular pop to diet pop, noting that she isn't consuming quite as much as she had in the past and also is trying to drink more water in addition.      Exercise: Just recently obtaining new shoes for her feet, hoping to start utilizing the treadmill next week    Nutrition Diagnosis:    Obesity (NC 3.3) related to  overeating and poor lifestyle habits as evidenced by patient's subjective statements and BMI of 71.5 kg/m2.       Intervention:  Food and/or nutrient delivery: balanced meals, adequate protein   Nutrition education: goal setting   Nutrition counseling: provided support and encouragement    Monitoring/Evaluation:    Goals:  Obtain a more recent weight.   Try coloring instead of eating out of boredom, especially on the weekends.   Start walking on the treadmill, start small and slowly build.     Patient to follow up in 2 week(s) with RD        Phone call duration: 13 minutes        Originating Location (pt. Location): Home      Distant Location (provider location):  Off-site      Odette Camargo RD          Again, thank you for allowing me to participate in the care of your patient.        Sincerely,        Odette Camargo RD

## 2023-12-04 NOTE — LETTER
12/4/2023         RE: Marlo Kearns  [address]  [place] [address]        Dear Colleague,    Thank you for referring your patient, Marlo Kearns, to the [facility]ERY [facility]ATRICS [facility]ARE [place]. Please see a copy of my visit note below.    Marlo Kearns is a 43 year old who is being evaluated via a billable telephone visit.      What phone number would you like to be contacted at? [phone]  How would you like to obtain your AVS? [facility]    Medical  Weight Loss Follow-Up Diet Evaluation  Assessment:  Marlo is presenting today for a follow up weight management nutrition consultation.  This patient has had an initial appointment and was referred by Dr. Naqvi  for MNT as treatment for Obesity which is impacting her Diabetes.     Pt's weight is 390 lbs (per her estimation, needs to get an updated weight, doesn't have batteries currently in her scale)  Initial weight: 370 lbs          No data to display              BMI: There is no height or weight on file to calculate BMI.  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     Borderline personality disorder (H)     Encephalopathy     Essential hypertension     Intraventricular hemorrhage (H)     Hemiplegia (H)     ACP (advance care planning)     Physical deconditioning     Malnutrition (H24)     TBI (traumatic brain injury) (H)     Unspecified episodic mood disorder     Diastolic dysfunction     Seizure disorder (H)     Wound of right foot     Foot infection     Bipolar disorder (H)     Tobacco dependence     Septicemia (H)     S/P coil embolization of cerebral aneurysm     Pulmonary hypertension (H)      PTSD (post-traumatic stress disorder)     CARMENZA (obstructive sleep apnea)     Neuropathy     Morbid obesity with BMI of 60.0-69.9, adult (H)     Obesity hypoventilation syndrome (H)     Microscopic hematuria     Lung nodules     Hyperglycemia     Hyperlipidemia, unspecified hyperlipidemia type     History of methamphetamine abuse (H)     Hemiparesis affecting right side as late effect of cerebrovascular accident (CVA) (H)     Heartburn     History of CVA (cerebrovascular accident)     Chest pain     Anxiety     Arteriovenous malformation of brain     Aphasia as late effect of cerebrovascular accident     Alcohol abuse, episodic     Adjustment disorder with depressed mood     Acute on chronic diastolic CHF (congestive heart failure) (H)     Lymphedema     Chronic pain     Hemiplegia of dominant side as late effect of cerebrovascular disease (H)     Impaired mobility     Mild persistent asthma without complication     Neuropathy of both feet     Shoulder joint pain     Type 2 diabetes mellitus without complication, without long-term current use of insulin (H)     Respiratory failure (H)     Nocturnal hypoxemia     Brain aneurysm     Diabetes: Yes-on Metformin    Progress on goals from last visit: Continues to utilize her MOW's for meals.  Patient reports that she is a little swollen today due to consuming extra sodium yesterday.  Patient reports that she is trying to work on reducing incidences of boredom eating, noting that she has been having conversations on the phone with others to help combat this. Patient reports that the medication is certainly helping in regards to smaller portion sizes.  Patient reports that she would like to attend some online classes again, noting that this could be helpful.  Patient reports that she continues to work on reduced consumption, trying to drink more water and coffee instead.      Exercise: hasn't started yet    Nutrition Diagnosis:    Obesity (NC 3.3) related to overeating  and poor lifestyle habits as evidenced by patient's subjective statements and BMI of 71.5 kg/m2.     Intervention:  Food and/or nutrient delivery: balanced meals, adequate protein   Nutrition education: options to consume instead of pop  Nutrition counseling: provided support and encouragement    Monitoring/Evaluation:    Goals:  Try replacing the pop with sparkling water as healthier alternative.   Get an updated weight.   Start utilizing the treadmill for exercise, start small and slowly build.    Patient to follow up in 2 week(s) with RD        Phone call duration: 16 minutes    Originating Location (pt. Location): Home      Distant Location (provider location):  Off-site        Odette Camargo RD          Again, thank you for allowing me to participate in the care of your patient.        Sincerely,        Odette Camargo RD

## 2023-12-04 NOTE — PROGRESS NOTES
Marlo Kearns is a 43 year old who is being evaluated via a billable telephone visit.      What phone number would you like to be contacted at? [phone]  How would you like to obtain your AVS? [facility]    Medical  Weight Loss Follow-Up Diet Evaluation  Assessment:  Marlo is presenting today for a follow up weight management nutrition consultation.  This patient has had an initial appointment and was referred by Dr. Naqvi  for MNT as treatment for Obesity which is impacting her Diabetes.     Pt's weight is 390 lbs (per her estimation, needs to get an updated weight, doesn't have batteries currently in her scale)  Initial weight: 370 lbs          No data to display              BMI: There is no height or weight on file to calculate BMI.  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    Borderline personality disorder (H)    Encephalopathy    Essential hypertension    Intraventricular hemorrhage (H)    Hemiplegia (H)    ACP (advance care planning)    Physical deconditioning    Malnutrition (H24)    TBI (traumatic brain injury) (H)    Unspecified episodic mood disorder    Diastolic dysfunction    Seizure disorder (H)    Wound of right foot    Foot infection    Bipolar disorder (H)    Tobacco dependence    Septicemia (H)    S/P coil embolization of cerebral aneurysm    Pulmonary hypertension (H)    PTSD (post-traumatic stress disorder)    CARMENZA (obstructive sleep apnea)    Neuropathy    Morbid obesity with BMI of 60.0-69.9, adult (H)    Obesity hypoventilation syndrome (H)    Microscopic hematuria    Lung nodules    Hyperglycemia    Hyperlipidemia, unspecified hyperlipidemia type     History of methamphetamine abuse (H)    Hemiparesis affecting right side as late effect of cerebrovascular accident (CVA) (H)    Heartburn    History of CVA (cerebrovascular accident)    Chest pain    Anxiety    Arteriovenous malformation of brain    Aphasia as late effect of cerebrovascular accident    Alcohol abuse, episodic    Adjustment disorder with depressed mood    Acute on chronic diastolic CHF (congestive heart failure) (H)    Lymphedema    Chronic pain    Hemiplegia of dominant side as late effect of cerebrovascular disease (H)    Impaired mobility    Mild persistent asthma without complication    Neuropathy of both feet    Shoulder joint pain    Type 2 diabetes mellitus without complication, without long-term current use of insulin (H)    Respiratory failure (H)    Nocturnal hypoxemia    Brain aneurysm     Diabetes: Yes-on Metformin    Progress on goals from last visit: Continues to utilize her MOW's for meals.  Patient reports that she is a little swollen today due to consuming extra sodium yesterday.  Patient reports that she is trying to work on reducing incidences of boredom eating, noting that she has been having conversations on the phone with others to help combat this. Patient reports that the medication is certainly helping in regards to smaller portion sizes.  Patient reports that she would like to attend some online classes again, noting that this could be helpful.  Patient reports that she continues to work on reduced consumption, trying to drink more water and coffee instead.      Exercise: hasn't started yet    Nutrition Diagnosis:    Obesity (NC 3.3) related to overeating and poor lifestyle habits as evidenced by patient's subjective statements and BMI of 71.5 kg/m2.     Intervention:  Food and/or nutrient delivery: balanced meals, adequate protein   Nutrition education: options to consume instead of pop  Nutrition counseling: provided support and  encouragement    Monitoring/Evaluation:    Goals:  Try replacing the pop with sparkling water as healthier alternative.   Get an updated weight.   Start utilizing the treadmill for exercise, start small and slowly build.    Patient to follow up in 2 week(s) with RD        Phone call duration: 16 minutes    Originating Location (pt. Location): Home      Distant Location (provider location):  Off-site        Odette Camargo RD

## 2023-12-20 NOTE — LETTER
12/20/2023         RE: Marlo Kearns  [address]  [place] [address]        Dear Colleague,    Thank you for referring your patient, Marlo Kearns, to the [facility]ATRICS [facility] [place]. Please see a copy of my visit note below.    Marlo Kearns is a 43 year old who is being evaluated via a billable telephone visit.      What phone number would you like to be contacted at? [phone]  How would you like to obtain your AVS? [facility]    Medical  Weight Loss Follow-Up Diet Evaluation  Assessment:  Marlo is presenting today for a follow up weight management nutrition consultation.  This patient has had an initial appointment and was referred by Dr. Naqvi  for MNT as treatment for Obesity which is impacting her Diabetes.   Pt's weight is 370 lbs   Initial weight: 370 lbs            No data to display              BMI: There is no height or weight on file to calculate BMI.  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     Borderline personality disorder (H)     Encephalopathy     Essential hypertension     Intraventricular hemorrhage (H)     Hemiplegia (H)     ACP (advance care planning)     Physical deconditioning     Malnutrition (H24)     TBI (traumatic brain injury) (H)     Unspecified episodic mood disorder     Diastolic dysfunction     Seizure disorder (H)     Wound of right foot     Foot infection     Bipolar disorder (H)     Tobacco dependence     Septicemia (H)     S/P coil embolization of cerebral aneurysm     Pulmonary hypertension (H)     PTSD (post-traumatic stress disorder)     CARMENZA (obstructive sleep apnea)     Neuropathy      Morbid obesity with BMI of 60.0-69.9, adult (H)     Obesity hypoventilation syndrome (H)     Microscopic hematuria     Lung nodules     Hyperglycemia     Hyperlipidemia, unspecified hyperlipidemia type     History of methamphetamine abuse (H)     Hemiparesis affecting right side as late effect of cerebrovascular accident (CVA) (H)     Heartburn     History of CVA (cerebrovascular accident)     Chest pain     Anxiety     Arteriovenous malformation of brain     Aphasia as late effect of cerebrovascular accident     Alcohol abuse, episodic     Adjustment disorder with depressed mood     Acute on chronic diastolic CHF (congestive heart failure) (H)     Lymphedema     Chronic pain     Hemiplegia of dominant side as late effect of cerebrovascular disease (H)     Impaired mobility     Mild persistent asthma without complication     Neuropathy of both feet     Shoulder joint pain     Type 2 diabetes mellitus without complication, without long-term current use of insulin (H)     Respiratory failure (H)     Nocturnal hypoxemia     Brain aneurysm     Diabetes: Yes-Metformin    Progress on goals from last visit: Patient reports that she did purchase and consuming sparkling water to try and help wean herself off the diet and regular pop.  Patient reports that she has also been pushing the water.  Patient reports that she continues to receive the MOW, sticking with the lower sodium options.  Patient reports that she continues to eat smaller portions.  Patient reports that she continues to work on boredom eating, noting that she is trying to find other things to do.     Exercise: no set regimen-hasn't started to use her treadmill    Nutrition Diagnosis:    Obesity (NC 3.3) related to overeating and poor lifestyle habits as evidenced by patient's subjective statements and BMI 67.8 kg/m2.  Intervention:  Food and/or nutrient delivery: balanced meals, adequate protein   Nutrition education: none  Nutrition counseling: provided  support and encouragement      Monitoring/Evaluation:    Goals:  Start weighing self 2 times/week.   Continue to work on reduction of diet/regular pop consumption.   Start walking on the treadmill for exercise, start small and slowly build.     Patient to follow up in 1 month(s) with RD        Phone call duration: 15 minutes        Originating Location (pt. Location): Home      Distant Location (provider location):  Off-site        Odette Camargo RD          Again, thank you for allowing me to participate in the care of your patient.        Sincerely,        Odette Camargo RD

## 2023-12-20 NOTE — PROGRESS NOTES
Marlo Kearns is a 43 year old who is being evaluated via a billable telephone visit.      What phone number would you like to be contacted at? [phone]  How would you like to obtain your AVS? [facility]    Medical  Weight Loss Follow-Up Diet Evaluation  Assessment:  Marlo is presenting today for a follow up weight management nutrition consultation.  This patient has had an initial appointment and was referred by Dr. Naqvi  for MNT as treatment for Obesity which is impacting her Diabetes.   Pt's weight is 370 lbs   Initial weight: 370 lbs            No data to display              BMI: There is no height or weight on file to calculate BMI.  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    Borderline personality disorder (H)    Encephalopathy    Essential hypertension    Intraventricular hemorrhage (H)    Hemiplegia (H)    ACP (advance care planning)    Physical deconditioning    Malnutrition (H24)    TBI (traumatic brain injury) (H)    Unspecified episodic mood disorder    Diastolic dysfunction    Seizure disorder (H)    Wound of right foot    Foot infection    Bipolar disorder (H)    Tobacco dependence    Septicemia (H)    S/P coil embolization of cerebral aneurysm    Pulmonary hypertension (H)    PTSD (post-traumatic stress disorder)    CARMENZA (obstructive sleep apnea)    Neuropathy    Morbid obesity with BMI of 60.0-69.9, adult (H)    Obesity hypoventilation syndrome (H)    Microscopic hematuria    Lung nodules    Hyperglycemia    Hyperlipidemia, unspecified hyperlipidemia type    History of methamphetamine abuse (H)    Hemiparesis affecting right side as late effect of  cerebrovascular accident (CVA) (H)    Heartburn    History of CVA (cerebrovascular accident)    Chest pain    Anxiety    Arteriovenous malformation of brain    Aphasia as late effect of cerebrovascular accident    Alcohol abuse, episodic    Adjustment disorder with depressed mood    Acute on chronic diastolic CHF (congestive heart failure) (H)    Lymphedema    Chronic pain    Hemiplegia of dominant side as late effect of cerebrovascular disease (H)    Impaired mobility    Mild persistent asthma without complication    Neuropathy of both feet    Shoulder joint pain    Type 2 diabetes mellitus without complication, without long-term current use of insulin (H)    Respiratory failure (H)    Nocturnal hypoxemia    Brain aneurysm     Diabetes: Yes-Metformin    Progress on goals from last visit: Patient reports that she did purchase and consuming sparkling water to try and help wean herself off the diet and regular pop.  Patient reports that she has also been pushing the water.  Patient reports that she continues to receive the MOW, sticking with the lower sodium options.  Patient reports that she continues to eat smaller portions.  Patient reports that she continues to work on boredom eating, noting that she is trying to find other things to do.     Exercise: no set regimen-hasn't started to use her treadmill    Nutrition Diagnosis:    Obesity (NC 3.3) related to overeating and poor lifestyle habits as evidenced by patient's subjective statements and BMI 67.8 kg/m2.  Intervention:  Food and/or nutrient delivery: balanced meals, adequate protein   Nutrition education: none  Nutrition counseling: provided support and encouragement      Monitoring/Evaluation:    Goals:  Start weighing self 2 times/week.   Continue to work on reduction of diet/regular pop consumption.   Start walking on the treadmill for exercise, start small and slowly build.     Patient to follow up in 1 month(s) with RD        Phone call duration: 15  minutes        Originating Location (pt. Location): Home      Distant Location (provider location):  Off-site        Odette Camargo RD

## 2024-01-01 ENCOUNTER — OFFICE VISIT (OUTPATIENT)
Dept: NEUROLOGY | Facility: CLINIC | Age: 44
End: 2024-01-01
Payer: MEDICAID

## 2024-01-01 ENCOUNTER — LAB REQUISITION (OUTPATIENT)
Dept: LAB | Facility: CLINIC | Age: 44
End: 2024-01-01
Payer: MEDICAID

## 2024-01-01 ENCOUNTER — VIRTUAL VISIT (OUTPATIENT)
Dept: SURGERY | Facility: CLINIC | Age: 44
End: 2024-01-01
Payer: MEDICAID

## 2024-01-01 ENCOUNTER — HEALTH MAINTENANCE LETTER (OUTPATIENT)
Age: 44
End: 2024-01-01

## 2024-01-01 VITALS
SYSTOLIC BLOOD PRESSURE: 140 MMHG | DIASTOLIC BLOOD PRESSURE: 79 MMHG | WEIGHT: 293 LBS | HEIGHT: 62 IN | BODY MASS INDEX: 53.92 KG/M2 | HEART RATE: 69 BPM

## 2024-01-01 DIAGNOSIS — E11.9 TYPE 2 DIABETES MELLITUS WITHOUT COMPLICATION, WITHOUT LONG-TERM CURRENT USE OF INSULIN (H): ICD-10-CM

## 2024-01-01 DIAGNOSIS — G40.209 LOCALIZATION-RELATED PARTIAL EPILEPSY WITH COMPLEX PARTIAL SEIZURES (H): ICD-10-CM

## 2024-01-01 DIAGNOSIS — G40.909 SEIZURE DISORDER (H): ICD-10-CM

## 2024-01-01 DIAGNOSIS — Z71.3 NUTRITIONAL COUNSELING: ICD-10-CM

## 2024-01-01 DIAGNOSIS — E11.9 TYPE 2 DIABETES MELLITUS WITHOUT COMPLICATION, WITHOUT LONG-TERM CURRENT USE OF INSULIN (H): Primary | ICD-10-CM

## 2024-01-01 DIAGNOSIS — E11.621 TYPE 2 DIABETES MELLITUS WITH FOOT ULCER (CODE) (H): ICD-10-CM

## 2024-01-01 DIAGNOSIS — E78.5 HYPERLIPIDEMIA, UNSPECIFIED HYPERLIPIDEMIA TYPE: ICD-10-CM

## 2024-01-01 DIAGNOSIS — I50.33 ACUTE ON CHRONIC DIASTOLIC CHF (CONGESTIVE HEART FAILURE) (H): ICD-10-CM

## 2024-01-01 DIAGNOSIS — E66.01 CLASS 3 SEVERE OBESITY DUE TO EXCESS CALORIES WITH SERIOUS COMORBIDITY AND BODY MASS INDEX (BMI) OF 60.0 TO 69.9 IN ADULT (H): ICD-10-CM

## 2024-01-01 DIAGNOSIS — E66.813 CLASS 3 SEVERE OBESITY DUE TO EXCESS CALORIES WITH SERIOUS COMORBIDITY AND BODY MASS INDEX (BMI) OF 60.0 TO 69.9 IN ADULT (H): ICD-10-CM

## 2024-01-01 DIAGNOSIS — E66.01 MORBID OBESITY WITH BMI OF 70 AND OVER, ADULT (H): Primary | ICD-10-CM

## 2024-01-01 DIAGNOSIS — E66.01 MORBID OBESITY WITH BMI OF 60.0-69.9, ADULT (H): ICD-10-CM

## 2024-01-01 LAB
ALBUMIN SERPL BCG-MCNC: 3.9 G/DL (ref 3.5–5.2)
ALP SERPL-CCNC: 69 U/L (ref 40–150)
ALT SERPL W P-5'-P-CCNC: 28 U/L (ref 0–50)
ANION GAP SERPL CALCULATED.3IONS-SCNC: 21 MMOL/L (ref 7–15)
AST SERPL W P-5'-P-CCNC: 48 U/L (ref 0–45)
BILIRUB SERPL-MCNC: 0.6 MG/DL
BUN SERPL-MCNC: 10.1 MG/DL (ref 6–20)
CALCIUM SERPL-MCNC: 9.3 MG/DL (ref 8.6–10)
CHLORIDE SERPL-SCNC: 88 MMOL/L (ref 98–107)
CREAT SERPL-MCNC: 0.63 MG/DL (ref 0.51–0.95)
DEPRECATED HCO3 PLAS-SCNC: 29 MMOL/L (ref 22–29)
EGFRCR SERPLBLD CKD-EPI 2021: >90 ML/MIN/1.73M2
GLUCOSE SERPL-MCNC: 181 MG/DL (ref 70–99)
POTASSIUM SERPL-SCNC: 3.5 MMOL/L (ref 3.4–5.3)
PROT SERPL-MCNC: 7.8 G/DL (ref 6.4–8.3)
SODIUM SERPL-SCNC: 138 MMOL/L (ref 135–145)

## 2024-01-01 PROCEDURE — 97803 MED NUTRITION INDIV SUBSEQ: CPT | Mod: 93 | Performed by: DIETITIAN, REGISTERED

## 2024-01-01 PROCEDURE — 97803 MED NUTRITION INDIV SUBSEQ: CPT | Mod: 93

## 2024-01-01 PROCEDURE — 99213 OFFICE O/P EST LOW 20 MIN: CPT

## 2024-01-01 PROCEDURE — 80053 COMPREHEN METABOLIC PANEL: CPT | Mod: ORL | Performed by: PHYSICIAN ASSISTANT

## 2024-01-01 RX ORDER — LEVETIRACETAM 750 MG/1
750 TABLET ORAL 2 TIMES DAILY
Qty: 60 TABLET | Refills: 0 | Status: SHIPPED | OUTPATIENT
Start: 2024-01-01 | End: 2024-01-01

## 2024-01-01 RX ORDER — LEVETIRACETAM 750 MG/1
750 TABLET ORAL 2 TIMES DAILY
Qty: 60 TABLET | Refills: 0 | OUTPATIENT
Start: 2024-01-01

## 2024-01-01 RX ORDER — LEVETIRACETAM 750 MG/1
750 TABLET ORAL 2 TIMES DAILY
Qty: 14 TABLET | Refills: 0 | Status: SHIPPED | OUTPATIENT
Start: 2024-01-01 | End: 2024-01-01

## 2024-01-01 RX ORDER — LAMOTRIGINE 150 MG/1
150 TABLET ORAL 2 TIMES DAILY
Qty: 180 TABLET | Refills: 3 | Status: CANCELLED | OUTPATIENT
Start: 2024-01-01

## 2024-01-01 RX ORDER — LEVETIRACETAM 750 MG/1
750 TABLET ORAL 2 TIMES DAILY
Qty: 180 TABLET | Refills: 3 | Status: SHIPPED | OUTPATIENT
Start: 2024-01-01

## 2024-01-01 RX ORDER — LEVETIRACETAM 750 MG/1
750 TABLET ORAL 2 TIMES DAILY
Qty: 28 TABLET | Refills: 0 | OUTPATIENT
Start: 2024-01-01

## 2024-01-15 NOTE — LETTER
1/15/2024         RE: Marlo Kearns  [address]  [place] [address]        Dear Colleague,    Thank you for referring your patient, Marlo Kearns, to the [facility] [place]. Please see a copy of my visit note below.    Marlo Kearns is a 43 year old who is being evaluated via a billable telephone visit.      What phone number would you like to be contacted at? [phone]  How would you like to obtain your AVS? [facility]    Medical  Weight Loss Follow-Up Diet Evaluation  Assessment:  Marlo is presenting today for a follow up weight management nutrition consultation.  This patient has had an initial appointment and was referred by Dr. Naqvi  for MNT as treatment for Obesity which is impacting her Diabetes.     Pt's weight is 380 lbs   Initial weight: 370 lbs   Weight change: up 10 lbs in the past month         No data to display              BMI: There is no height or weight on file to calculate BMI.  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     Borderline personality disorder (H)     Encephalopathy     Essential hypertension     Intraventricular hemorrhage (H)     Hemiplegia (H)     ACP (advance care planning)     Physical deconditioning     Malnutrition (H24)     TBI (traumatic brain injury) (H)     Unspecified episodic mood disorder     Diastolic dysfunction     Seizure disorder (H)     Wound of right foot     Foot infection     Bipolar disorder (H)     Tobacco dependence     Septicemia (H)     S/P coil embolization of cerebral aneurysm     Pulmonary hypertension (H)     PTSD (post-traumatic stress disorder)     CARMENZA  (obstructive sleep apnea)     Neuropathy     Morbid obesity with BMI of 60.0-69.9, adult (H)     Obesity hypoventilation syndrome (H)     Microscopic hematuria     Lung nodules     Hyperglycemia     Hyperlipidemia, unspecified hyperlipidemia type     History of methamphetamine abuse (H)     Hemiparesis affecting right side as late effect of cerebrovascular accident (CVA) (H)     Heartburn     History of CVA (cerebrovascular accident)     Chest pain     Anxiety     Arteriovenous malformation of brain     Aphasia as late effect of cerebrovascular accident     Alcohol abuse, episodic     Adjustment disorder with depressed mood     Acute on chronic diastolic CHF (congestive heart failure) (H)     Lymphedema     Chronic pain     Hemiplegia of dominant side as late effect of cerebrovascular disease (H)     Impaired mobility     Mild persistent asthma without complication     Neuropathy of both feet     Shoulder joint pain     Type 2 diabetes mellitus without complication, without long-term current use of insulin (H)     Respiratory failure (H)     Nocturnal hypoxemia     Brain aneurysm     Diabetes: Yes-on Metformin    Progress on goals from last visit: Had been weighing herself 2 times/week, however has feel off track more recently. Trying to stay on track with eating healthy (noting that for breakfast she had a yogurt with fruit).  Patient reports that she does need to be better about making more changes in regards to healthy eating along with increased consumption of pop and alcohol, noting that she just needs to be more accountable with herself. Patient reports that she has been making poor choices.     Exercise: no set regimen due to issues with her feet-being followed by wound care    Nutrition Diagnosis:    Obesity (NC 3.3) related to overeating and poor lifestyle habits as evidenced by patient's subjective statements and BMI 69.6 kg/m2.     Intervention:  Food and/or nutrient delivery: balanced meals, adequate  protein   Nutrition education: goal setting   Nutrition counseling: provided support and encouragement       Monitoring/Evaluation:    Goals:  Work on reduced consumption of alcohol over the next month.    Continue to weigh self at least 1 time/week.     Patient to follow up in 1 month(s) with RD        Phone call duration: 12 minutes    Video-Visit Details    Originating Location (pt. Location): Home      Distant Location (provider location):  Off-site    Mode of Communication:  Video Conference via [employer]    Physician has received verbal consent for a Video Visit from the patient? Yes      Odette Camargo RD          Again, thank you for allowing me to participate in the care of your patient.        Sincerely,        Odette Camargo RD

## 2024-01-15 NOTE — PROGRESS NOTES
Marlo Kearns is a 43 year old who is being evaluated via a billable telephone visit.      What phone number would you like to be contacted at? [phone]  How would you like to obtain your AVS? [facility]    Medical  Weight Loss Follow-Up Diet Evaluation  Assessment:  Marlo is presenting today for a follow up weight management nutrition consultation.  This patient has had an initial appointment and was referred by Dr. Naqvi  for MNT as treatment for Obesity which is impacting her Diabetes.     Pt's weight is 380 lbs   Initial weight: 370 lbs   Weight change: up 10 lbs in the past month         No data to display              BMI: There is no height or weight on file to calculate BMI.  Patient weight not recorded    Estimated RMR ([place]-St Jeor equation):   2325 kcals x 1.2 (sedentary) = 2790 kcals (for weight maintenance)     Recommended Protein Intake: 60-80 grams of protein/day  Patient Active Problem List:  Patient Active Problem List   Diagnosis    Cerebral edema (H)    Acute ischemic stroke (H)    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    Borderline personality disorder (H)    Encephalopathy    Essential hypertension    Intraventricular hemorrhage (H)    Hemiplegia (H)    ACP (advance care planning)    Physical deconditioning    Malnutrition (H24)    TBI (traumatic brain injury) (H)    Unspecified episodic mood disorder    Diastolic dysfunction    Seizure disorder (H)    Wound of right foot    Foot infection    Bipolar disorder (H)    Tobacco dependence    Septicemia (H)    S/P coil embolization of cerebral aneurysm    Pulmonary hypertension (H)    PTSD (post-traumatic stress disorder)    CARMENZA (obstructive sleep apnea)    Neuropathy    Morbid obesity with BMI of 60.0-69.9, adult (H)    Obesity hypoventilation syndrome (H)    Microscopic hematuria    Lung nodules    Hyperglycemia    Hyperlipidemia, unspecified hyperlipidemia type    History of methamphetamine abuse (H)    Hemiparesis  affecting right side as late effect of cerebrovascular accident (CVA) (H)    Heartburn    History of CVA (cerebrovascular accident)    Chest pain    Anxiety    Arteriovenous malformation of brain    Aphasia as late effect of cerebrovascular accident    Alcohol abuse, episodic    Adjustment disorder with depressed mood    Acute on chronic diastolic CHF (congestive heart failure) (H)    Lymphedema    Chronic pain    Hemiplegia of dominant side as late effect of cerebrovascular disease (H)    Impaired mobility    Mild persistent asthma without complication    Neuropathy of both feet    Shoulder joint pain    Type 2 diabetes mellitus without complication, without long-term current use of insulin (H)    Respiratory failure (H)    Nocturnal hypoxemia    Brain aneurysm     Diabetes: Yes-on Metformin    Progress on goals from last visit: Had been weighing herself 2 times/week, however has feel off track more recently. Trying to stay on track with eating healthy (noting that for breakfast she had a yogurt with fruit).  Patient reports that she does need to be better about making more changes in regards to healthy eating along with increased consumption of pop and alcohol, noting that she just needs to be more accountable with herself. Patient reports that she has been making poor choices.     Exercise: no set regimen due to issues with her feet-being followed by wound care    Nutrition Diagnosis:    Obesity (NC 3.3) related to overeating and poor lifestyle habits as evidenced by patient's subjective statements and BMI 69.6 kg/m2.     Intervention:  Food and/or nutrient delivery: balanced meals, adequate protein   Nutrition education: goal setting   Nutrition counseling: provided support and encouragement       Monitoring/Evaluation:    Goals:  Work on reduced consumption of alcohol over the next month.    Continue to weigh self at least 1 time/week.     Patient to follow up in 1 month(s) with RD        Phone call duration:  12 minutes    Video-Visit Details    Originating Location (pt. Location): Home      Distant Location (provider location):  Off-site    Mode of Communication:  Video Conference via [facility]    Physician has received verbal consent for a Video Visit from the patient? Yes      Odette Camargo, ALBA

## 2024-01-15 NOTE — TELEPHONE ENCOUNTER
Refill request for: levETIRAcetam (KEPPRA) 750 MG tablet    Directions: Take 1 tablet (750 mg) by mouth 2 times daily     LOV: 9/29/21  NOV: 2/13/24    30 day supply with 0 refills Medication T'd for review and signature  Dominga Garber CMA on 1/15/2024 at 3:54 PM  [facility]

## 2024-02-09 NOTE — TELEPHONE ENCOUNTER
Refill request for: levETIRAcetam (KEPPRA) 750 MG tablet    Directions: Take 1 tablet (750 mg) by mouth 2 times daily     LOV: 9/29/21- Patient has canceled the last 3 visits  NOV: 3/21/24    30 day supply with 0 refills Medication T'd for review and signature  To ensure patient comes to appointment and not continue to cancel  Dominga Garber CMA on 2/9/2024 at 1:40 PM  [facility]

## 2024-02-14 NOTE — PROGRESS NOTES
Marlo Kearns is a 43 year old who is being evaluated via a billable telephone visit.      What phone number would you like to be contacted at? [phone]  How would you like to obtain your AVS? [facility]    Medical  Weight Loss Follow-Up Diet Evaluation  Assessment:  Marlo is presenting today for a follow up weight management nutrition consultation.  This patient has had an initial appointment and was referred by Dr. Naqvi  for MNT as treatment for Obesity which is impacting her Diabetes.    Pt's weight is 380 lbs (from last weight, patient reports that she does notice that her clothes are fitting better, feeling like she has had some weight loss)  Initial weight: 370 lbs           No data to display              BMI: There is no height or weight on file to calculate BMI.  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    Borderline personality disorder (H)    Encephalopathy    Essential hypertension    Intraventricular hemorrhage (H)    Hemiplegia (H)    ACP (advance care planning)    Physical deconditioning    Malnutrition (H24)    TBI (traumatic brain injury) (H)    Unspecified episodic mood disorder    Diastolic dysfunction    Seizure disorder (H)    Wound of right foot    Foot infection    Bipolar disorder (H)    Tobacco dependence    Septicemia (H)    S/P coil embolization of cerebral aneurysm    Pulmonary hypertension (H)    PTSD (post-traumatic stress disorder)    CARMENZA (obstructive sleep apnea)    Neuropathy    Morbid obesity with BMI of 60.0-69.9, adult (H)    Obesity hypoventilation syndrome (H)    Microscopic hematuria    Lung nodules    Hyperglycemia    Hyperlipidemia,  unspecified hyperlipidemia type    History of methamphetamine abuse (H)    Hemiparesis affecting right side as late effect of cerebrovascular accident (CVA) (H)    Heartburn    History of CVA (cerebrovascular accident)    Chest pain    Anxiety    Arteriovenous malformation of brain    Aphasia as late effect of cerebrovascular accident    Alcohol abuse, episodic    Adjustment disorder with depressed mood    Acute on chronic diastolic CHF (congestive heart failure) (H)    Lymphedema    Chronic pain    Hemiplegia of dominant side as late effect of cerebrovascular disease (H)    Impaired mobility    Mild persistent asthma without complication    Neuropathy of both feet    Shoulder joint pain    Type 2 diabetes mellitus without complication, without long-term current use of insulin (H)    Respiratory failure (H)    Nocturnal hypoxemia    Brain aneurysm     Diabetes: Yes-on Metformin    Progress on goals from last visit: Has been working on reduced consumption of alcohol along with regular Mt Dew and has switched to a sugar free beverage instead.  Patient reports that she continues to focus on protein, consuming foods such as sausage, PB, protein from her MOW, yogurt, etc... Patient reports that she is working on making better choices, noting that it is certainly better compared to the previous month.      Beverages: Fresca, Water (needs to increase consumption)  Exercise: No set regimen-continues to struggle with wound issues on her feet (has an appt on the 19th with the wound clinic for further evaluation)    Nutrition Diagnosis:    Obesity (NC 3.3) related to overeating and poor lifestyle habits as evidenced by patient's subjective statements and BMI 69.6 kg/m2.     Intervention:  Food and/or nutrient delivery: balanced meals, adequate protein   Nutrition education: goal setting  Nutrition counseling: provided support and encouragement     Monitoring/Evaluation:    Goals:  Start weighing self once weekly.  Continue to  work on reducing calorie containing beverages, working on increased water intake.     Patient to follow up in 1 month(s) with RD        Phone call duration: 14 minutes        Originating Location (pt. Location): Home      Distant Location (provider location):  Off-site    Mode of Communication:  Video Conference via [employer]    Physician has received verbal consent for a Video Visit from the patient? Yes      Odette Camargo, RD

## 2024-02-14 NOTE — LETTER
2/14/2024         RE: Marlo Kearns  [address]  [place] [address]        Dear Colleague,    Thank you for referring your patient, Marlo Kearns, to the [facility] AND BARIATRICS CARE [place]. Please see a copy of my visit note below.    Marlo Kearns is a 43 year old who is being evaluated via a billable telephone visit.      What phone number would you like to be contacted at? [phone]  How would you like to obtain your AVS? [facility]    Medical  Weight Loss Follow-Up Diet Evaluation  Assessment:  Marlo is presenting today for a follow up weight management nutrition consultation.  This patient has had an initial appointment and was referred by Dr. Naqvi  for MNT as treatment for Obesity which is impacting her Diabetes.    Pt's weight is 380 lbs (from last weight, patient reports that she does notice that her clothes are fitting better, feeling like she has had some weight loss)  Initial weight: 370 lbs           No data to display              BMI: There is no height or weight on file to calculate BMI.  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     Borderline personality disorder (H)     Encephalopathy     Essential hypertension     Intraventricular hemorrhage (H)     Hemiplegia (H)     ACP (advance care planning)     Physical deconditioning     Malnutrition (H24)     TBI (traumatic brain injury) (H)     Unspecified episodic mood disorder     Diastolic dysfunction     Seizure disorder (H)     Wound of right foot     Foot infection     Bipolar disorder (H)     Tobacco dependence     Septicemia (H)     S/P coil embolization of cerebral  aneurysm     Pulmonary hypertension (H)     PTSD (post-traumatic stress disorder)     CARMENZA (obstructive sleep apnea)     Neuropathy     Morbid obesity with BMI of 60.0-69.9, adult (H)     Obesity hypoventilation syndrome (H)     Microscopic hematuria     Lung nodules     Hyperglycemia     Hyperlipidemia, unspecified hyperlipidemia type     History of methamphetamine abuse (H)     Hemiparesis affecting right side as late effect of cerebrovascular accident (CVA) (H)     Heartburn     History of CVA (cerebrovascular accident)     Chest pain     Anxiety     Arteriovenous malformation of brain     Aphasia as late effect of cerebrovascular accident     Alcohol abuse, episodic     Adjustment disorder with depressed mood     Acute on chronic diastolic CHF (congestive heart failure) (H)     Lymphedema     Chronic pain     Hemiplegia of dominant side as late effect of cerebrovascular disease (H)     Impaired mobility     Mild persistent asthma without complication     Neuropathy of both feet     Shoulder joint pain     Type 2 diabetes mellitus without complication, without long-term current use of insulin (H)     Respiratory failure (H)     Nocturnal hypoxemia     Brain aneurysm     Diabetes: Yes-on Metformin    Progress on goals from last visit: Has been working on reduced consumption of alcohol along with regular Mt Dew and has switched to a sugar free beverage instead.  Patient reports that she continues to focus on protein, consuming foods such as sausage, PB, protein from her MOW, yogurt, etc... Patient reports that she is working on making better choices, noting that it is certainly better compared to the previous month.      Beverages: Fresca, Water (needs to increase consumption)  Exercise: No set regimen-continues to struggle with wound issues on her feet (has an appt on the 19th with the wound clinic for further evaluation)    Nutrition Diagnosis:    Obesity (NC 3.3) related to overeating and poor lifestyle habits as  evidenced by patient's subjective statements and BMI 69.6 kg/m2.     Intervention:  Food and/or nutrient delivery: balanced meals, adequate protein   Nutrition education: goal setting  Nutrition counseling: provided support and encouragement     Monitoring/Evaluation:    Goals:  Start weighing self once weekly.  Continue to work on reducing calorie containing beverages, working on increased water intake.     Patient to follow up in 1 month(s) with RD        Phone call duration: 14 minutes        Originating Location (pt. Location): Home      Distant Location (provider location):  Off-site    Mode of Communication:  Video Conference via [facility]    Physician has received verbal consent for a Video Visit from the patient? Yes      Odette Camargo RD          Again, thank you for allowing me to participate in the care of your patient.        Sincerely,        Odette Camargo RD

## 2024-03-11 NOTE — PROGRESS NOTES
Marlo Kearns is a 43 year old who is being evaluated via a billable telephone visit.      What phone number would you like to be contacted at? [phone]  How would you like to obtain your AVS? [facility]    Medical  Weight Loss Follow-Up Diet Evaluation  Assessment:  Marlo is presenting today for a follow up weight management nutrition consultation.  This patient has had an initial appointment and was referred by Dr. Naqvi  for MNT as treatment for Morbid obesity which is impacting her Diabetes.    Pt's weight is 385 lbs   Initial weight: 370 lbs  Weight changes: 15 lbs gain per patient report.           No data to display              BMI: There is no height or weight on file to calculate BMI.  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    Borderline personality disorder (H)    Encephalopathy    Essential hypertension    Intraventricular hemorrhage (H)    Hemiplegia (H)    ACP (advance care planning)    Physical deconditioning    Malnutrition (H24)    TBI (traumatic brain injury) (H)    Unspecified episodic mood disorder    Diastolic dysfunction    Seizure disorder (H)    Wound of right foot    Foot infection    Bipolar disorder (H)    Tobacco dependence    Septicemia (H)    S/P coil embolization of cerebral aneurysm    Pulmonary hypertension (H)    PTSD (post-traumatic stress disorder)    CARMENZA (obstructive sleep apnea)    Neuropathy    Morbid obesity with BMI of 60.0-69.9, adult (H)    Obesity hypoventilation syndrome (H)    Microscopic hematuria    Lung nodules    Hyperglycemia    Hyperlipidemia, unspecified hyperlipidemia type    History of methamphetamine abuse (H)     Hemiparesis affecting right side as late effect of cerebrovascular accident (CVA) (H)    Heartburn    History of CVA (cerebrovascular accident)    Chest pain    Anxiety    Arteriovenous malformation of brain    Aphasia as late effect of cerebrovascular accident    Alcohol abuse, episodic    Adjustment disorder with depressed mood    Acute on chronic diastolic CHF (congestive heart failure) (H)    Lymphedema    Chronic pain    Hemiplegia of dominant side as late effect of cerebrovascular disease (H)    Impaired mobility    Mild persistent asthma without complication    Neuropathy of both feet    Shoulder joint pain    Type 2 diabetes mellitus without complication, without long-term current use of insulin (H)    Respiratory failure (H)    Nocturnal hypoxemia    Brain aneurysm     Diabetes: Yes-on Metformin, Victoza   Hgb A1c: n/a    Progress on goals from last visit: Patient reports she has not been doing well over this last month regarding her nutritional choices. Has been eating out often (fast food). Choosing diet soda over regular soda. Needs to increase water consumption. MOW provides most balanced meal vs homemade meals. Relies on help with cooking from parents.      Breakfast: MOW Or 4 pieces of peter with scrambled eggs   Lunch: MOW Or fast food ([employer])   Dinner: MOW Or whatever her mother cooks, Goulash or pasta with turkey meatballs   Beverages:   Water (needs to increase consumption)  Diet soda 2/day  Coffee 2 cups/day   Alcohol: 2 drinks/day  Exercise: No set regimen-continues to struggle with wound issues on her feet (has an appt on the 19th with the wound clinic for further evaluation)    Nutrition Diagnosis:    Morbid obesity related to overeating and poor lifestyle habits as evidenced by patient's subjective statements and BMI 69.6 kg/m2.     Intervention:  Food and/or nutrient delivery: balanced meals, adequate protein, limit liquid calories   Nutrition education: goal setting  Nutrition  counseling: provided support and encouragement     Monitoring/Evaluation:    Goals:  Start weighing self once weekly.  Continue to work on reducing calorie containing beverages, working on increased water intake.   Limit/eliminate alcoholic beverages daily  Increase lean protein at meals - pair with fruit or vegetable     Patient to follow up in 1.5 month(s) with RD        Phone call duration: 24 minutes        Originating Location (pt. Location): Home      Distant Location (provider location):  Off-site    Mode of Communication:  Video Conference via [facility]    Physician has received verbal consent for a Video Visit from the patient? Yes      Elisha Morales RD

## 2024-03-11 NOTE — LETTER
3/11/2024         RE: Marlo Kearns  [address]  [place] [address]        Dear Colleague,    Thank you for referring your patient, Marlo Kearns, to the [facility] [place]. Please see a copy of my visit note below.    Marlo Kearns is a 43 year old who is being evaluated via a billable telephone visit.      What phone number would you like to be contacted at? [phone]  How would you like to obtain your AVS? [facility]    Medical  Weight Loss Follow-Up Diet Evaluation  Assessment:  Marlo is presenting today for a follow up weight management nutrition consultation.  This patient has had an initial appointment and was referred by Dr. Naqvi  for MNT as treatment for Morbid obesity which is impacting her Diabetes.    Pt's weight is 385 lbs   Initial weight: 370 lbs  Weight changes: 15 lbs gain per patient report.           No data to display              BMI: There is no height or weight on file to calculate BMI.  Patient weight not recorded    Estimated RMR ([place]-St Jeor equation):   2325 kcals x 1.2 (sedentary) = 2790 kcals (for weight maintenance)  Recommended Protein Intake:  grams of protein/day  Patient Active Problem List:  Patient Active Problem List   Diagnosis     Cerebral edema (H)     Acute ischemic stroke (H)     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     Borderline personality disorder (H)     Encephalopathy     Essential hypertension     Intraventricular hemorrhage (H)     Hemiplegia (H)     ACP (advance care planning)     Physical deconditioning     Malnutrition (H24)     TBI (traumatic brain injury) (H)     Unspecified episodic mood disorder     Diastolic dysfunction     Seizure disorder (H)     Wound of right foot     Foot infection     Bipolar disorder (H)     Tobacco dependence     Septicemia (H)     S/P coil embolization of cerebral aneurysm     Pulmonary hypertension (H)     PTSD (post-traumatic stress disorder)      CARMENZA (obstructive sleep apnea)     Neuropathy     Morbid obesity with BMI of 60.0-69.9, adult (H)     Obesity hypoventilation syndrome (H)     Microscopic hematuria     Lung nodules     Hyperglycemia     Hyperlipidemia, unspecified hyperlipidemia type     History of methamphetamine abuse (H)     Hemiparesis affecting right side as late effect of cerebrovascular accident (CVA) (H)     Heartburn     History of CVA (cerebrovascular accident)     Chest pain     Anxiety     Arteriovenous malformation of brain     Aphasia as late effect of cerebrovascular accident     Alcohol abuse, episodic     Adjustment disorder with depressed mood     Acute on chronic diastolic CHF (congestive heart failure) (H)     Lymphedema     Chronic pain     Hemiplegia of dominant side as late effect of cerebrovascular disease (H)     Impaired mobility     Mild persistent asthma without complication     Neuropathy of both feet     Shoulder joint pain     Type 2 diabetes mellitus without complication, without long-term current use of insulin (H)     Respiratory failure (H)     Nocturnal hypoxemia     Brain aneurysm     Diabetes: Yes-on Metformin, Victoza   Hgb A1c: n/a    Progress on goals from last visit: Patient reports she has not been doing well over this last month regarding her nutritional choices. Has been eating out often (fast food). Choosing diet soda over regular soda. Needs to increase water consumption. MOW provides most balanced meal vs homemade meals. Relies on help with cooking from parents.      Breakfast: MOW Or 4 pieces of peter with scrambled eggs   Lunch: MOW Or fast food ([employer])   Dinner: MOW Or whatever her mother cooks, Goulash or pasta with turkey meatballs   Beverages:   Water (needs to increase consumption)  Diet soda 2/day  Coffee 2 cups/day   Alcohol: 2 drinks/day  Exercise: No set regimen-continues to struggle with wound issues on her feet (has an appt on the 19th with the wound clinic for further  evaluation)    Nutrition Diagnosis:    Morbid obesity related to overeating and poor lifestyle habits as evidenced by patient's subjective statements and BMI 69.6 kg/m2.     Intervention:  Food and/or nutrient delivery: balanced meals, adequate protein, limit liquid calories   Nutrition education: goal setting  Nutrition counseling: provided support and encouragement     Monitoring/Evaluation:    Goals:  Start weighing self once weekly.  Continue to work on reducing calorie containing beverages, working on increased water intake.   Limit/eliminate alcoholic beverages daily  Increase lean protein at meals - pair with fruit or vegetable     Patient to follow up in 1.5 month(s) with RD        Phone call duration: 24 minutes        Originating Location (pt. Location): Home      Distant Location (provider location):  Off-site    Mode of Communication:  Video Conference via [employer]    Physician has received verbal consent for a Video Visit from the patient? Yes      Elisha Morales RD            Again, thank you for allowing me to participate in the care of your patient.        Sincerely,        Elisha Morales RD

## 2024-03-11 NOTE — PATIENT INSTRUCTIONS
Diabetic friendly snacks       Greek yogurt with berries: Choose plain Greek yogurt and add some fresh berries like strawberries, blueberries, or raspberries for a tasty and satisfying snack.    Nuts: A small handful of almonds, walnuts, or pecans can provide healthy fats, protein, and fiber to keep you feeling full and satisfied.    Veggie sticks with hummus: Enjoy sliced cucumbers, bell peppers, carrots, or celery with a serving of hummus for a crunchy and nutritious snack.    Cottage cheese with cherry tomatoes: Cottage cheese is a good source of protein, and pairing it with cherry tomatoes adds flavor and some additional vitamins.    Hard-boiled eggs: Hard-boiled eggs are convenient and packed with protein, making them an excellent snack choice for managing blood sugar levels.    Avocado slices on whole grain crackers: Spread some avocado slices on whole grain crackers for a satisfying and heart-healthy snack option.    Cheese and whole grain crackers: Pairing cheese with whole grain crackers provides protein and fiber, making it a satisfying and balanced snack choice.    Edamame: Steamed edamame pods are a good source of plant-based protein and fiber, making them a nutritious snack option for diabetics.    Sugar-free gelatin: Sugar-free gelatin is a low-calorie snack that can satisfy your sweet tooth without spiking blood sugar levels.    Apple slices with peanut butter: Enjoy apple slices with a tablespoon of peanut butter for a delicious combination of fiber, protein, and healthy fats.

## 2024-03-13 NOTE — TELEPHONE ENCOUNTER
Refill request for: Keppra    Directions: Take 1 tablet BID      LOV: 9/29/21  NOV: 3/21/24 ( Patient has cancelled last 3 visits)     30 day supply with 0 refills Medication T'd for review and signature

## 2024-04-05 NOTE — TELEPHONE ENCOUNTER
Last Seen 9/29/21 - Should patient be discharged? Continues to cancel and request refills.     Refill request for: Keppra    Directions: Take 1 tablet BID     LOV: 9/29/21  NOV: 5/8/24 (Continues to Cancel appointments)    14 day supply with 0 refills Medication T'd for review and signature     Cancelled Appointments: 2/17/23, 6/6/23, 2/13/24, & 3/21/24   
Ok to discharge.   
Please send in limited supply for patient.   T'd.   
(2) 7 to less than 13 years old

## 2024-04-12 NOTE — TELEPHONE ENCOUNTER
Health Call Center    Phone Message    May a detailed message be left on voicemail: yes     Reason for Call: Medication Refill Request    Has the patient contacted the pharmacy for the refill? Yes   Name of medication being requested: levETIRAcetam (KEPPRA) 750 MG   Provider who prescribed the medication: Erwin Retana MD   Pharmacy: [id number] [place], [place] - [address] [facility]      Date medication is needed: 04/12/24   Please call Marlo at [phone] to discuss further.    Action Taken: Message routed to:  Other: [facility]    Travel Screening: Not Applicable

## 2024-04-12 NOTE — TELEPHONE ENCOUNTER
Sending refill request to on call MD so pt does not run out over the weekend.  Medication T'd for review and signature    Glenys Nieves LPN on 4/12/2024 at 1:02 PM

## 2024-05-07 NOTE — TELEPHONE ENCOUNTER
Patient has not been seen since 2021- Looking for a refill on Keppra  Was granted a 30 day supply 4/12/24 as she had an appt with Demetria 5/8/24 but patient has since CANCELED this appt. Can this be denied?  Dominga Garber, JUANCARLOS on 5/7/2024 at 11:49 AM  [facility]

## 2024-05-15 NOTE — TELEPHONE ENCOUNTER
Refill request for: levETIRAcetam (KEPPRA) 750 MG tablet              Directions: Take 1 tablet (750 mg) by mouth 2 times daily      LOV: 9/29/21- Patient has canceled the last 3 visits  NOV: 5/23/24 with Demetria     7 day supply with 0 refills Medication T'd for review and signature  To ensure patient comes to appointment and not continue to cancel  Dominga Garber CMA on 5/15/2024 at 12:18 PM  [facility]

## 2024-05-16 NOTE — PROGRESS NOTES
Marlo Kearns is a 43 year old who is being evaluated via a billable telephone visit.      What phone number would you like to be contacted at? [phone]  How would you like to obtain your AVS? [facility]    Medical  Weight Loss Follow-Up Diet Evaluation  Assessment:  Marlo is presenting today for a follow up weight management nutrition consultation.  This patient has had an initial appointment and was referred by Dr. Naqvi  for MNT as treatment for Morbid obesity which is impacting her Diabetes.    Pt's weight is 390 lbs   Initial weight: 370 lbs  Weight changes: 20 lbs gain per patient report.           No data to display              BMI:71.3  Patient weight not recorded    Estimated RMR ([place]-St Jeor equation):   2325 kcals x 1.2 (sedentary) = 2790 kcals (for weight maintenance)  Recommended Protein Intake:  grams of protein/day  Patient Active Problem List:  Patient Active Problem List   Diagnosis    Cerebral edema (H)    Acute ischemic stroke (H)    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    Borderline personality disorder (H)    Encephalopathy    Essential hypertension    Intraventricular hemorrhage (H)    Hemiplegia (H)    ACP (advance care planning)    Physical deconditioning    Malnutrition (H24)    TBI (traumatic brain injury) (H)    Unspecified episodic mood disorder    Diastolic dysfunction    Seizure disorder (H)    Wound of right foot    Foot infection    Bipolar disorder (H)    Tobacco dependence    Septicemia (H)    S/P coil embolization of cerebral aneurysm    Pulmonary hypertension (H)    PTSD (post-traumatic stress disorder)    CARMENZA (obstructive sleep apnea)    Neuropathy    Morbid obesity with BMI of 60.0-69.9, adult (H)    Obesity hypoventilation syndrome (H)    Microscopic hematuria    Lung nodules    Hyperglycemia    Hyperlipidemia, unspecified hyperlipidemia type    History of methamphetamine abuse (H)    Hemiparesis affecting right side as late effect of  cerebrovascular accident (CVA) (H)    Heartburn    History of CVA (cerebrovascular accident)    Chest pain    Anxiety    Arteriovenous malformation of brain    Aphasia as late effect of cerebrovascular accident    Alcohol abuse, episodic    Adjustment disorder with depressed mood    Acute on chronic diastolic CHF (congestive heart failure) (H)    Lymphedema    Chronic pain    Hemiplegia of dominant side as late effect of cerebrovascular disease (H)    Impaired mobility    Mild persistent asthma without complication    Neuropathy of both feet    Shoulder joint pain    Type 2 diabetes mellitus without complication, without long-term current use of insulin (H)    Respiratory failure (H)    Nocturnal hypoxemia    Brain aneurysm     Diabetes: Type 2 DM- on Metformin,   Hgb A1c: n/a patient report of recent A1c at 6%    Progress on goals from last visit: Patient reports she is bedridden and is not able to walk d/t pain and wounds at her feet. Sees the podiatrist. Continues to smoke. Reports she is drinking mostly diet beverages. Needs to improve her water intake. Continues to work on limiting fast food intake.    Continue to work on reducing calorie containing beverages, working on increased water intake.   Limit/eliminate alcoholic beverages daily - in progress  Increase lean protein at meals - pair with fruit or vegetable - not met       Breakfast: skips  Lunch: MOW Or fast food ([employer])   Dinner: MOW Or whatever her mother cooks, Goulash or pasta with turkey meatballs   Snakcs: fruit when available, greek yogurt   Beverages:   Water (needs to increase consumption)  Diet soda 2 cans/day- occasionally Mt Dew  Coffee 2 cups/day   Alcohol: 2 drinks/day  Exercise: No set regimen-continues to struggle with wound issues on her feet     Nutrition Diagnosis:    Morbid obesity related to overeating and poor lifestyle habits as evidenced by patient's subjective statements and BMI 71.3 kg/m2.     Intervention:  Food and/or  nutrient delivery: balanced meals, adequate protein, limit liquid calories. Consistency with meal time.  Nutrition education: goal setting  Nutrition counseling: provided support and encouragement     Monitoring/Evaluation:    Goals:  Avoid skipping meals   Continue to work on reducing calorie containing beverages, working on increased water intake.   Limit/eliminate alcoholic beverages daily  Increase lean protein at meals - pair with fruit or vegetable     Patient to follow up in 2 month(s) with RD        Phone call duration: 18 minutes        Originating Location (pt. Location): Home      Distant Location (provider location):  Off-site    Mode of Communication:  Video Conference via [facility]    Physician has received verbal consent for a Video Visit from the patient? Yes      Elisha Morales RD

## 2024-05-16 NOTE — LETTER
5/16/2024         RE: Marlo Kearns  [address]  [place] [address]        Dear Colleague,    Thank you for referring your patient, Malro Kearns, to the [facility] [place]. Please see a copy of my visit note below.    Marlo Kearns is a 43 year old who is being evaluated via a billable telephone visit.      What phone number would you like to be contacted at? [phone]  How would you like to obtain your AVS? [facility]    Medical  Weight Loss Follow-Up Diet Evaluation  Assessment:  Marlo is presenting today for a follow up weight management nutrition consultation.  This patient has had an initial appointment and was referred by Dr. Naqvi  for MNT as treatment for Morbid obesity which is impacting her Diabetes.    Pt's weight is 390 lbs   Initial weight: 370 lbs  Weight changes: 20 lbs gain per patient report.           No data to display              BMI:71.3  Patient weight not recorded    Estimated RMR ([place]-St Jeor equation):   2325 kcals x 1.2 (sedentary) = 2790 kcals (for weight maintenance)  Recommended Protein Intake:  grams of protein/day  Patient Active Problem List:  Patient Active Problem List   Diagnosis     Cerebral edema (H)     Acute ischemic stroke (H)     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     Borderline personality disorder (H)     Encephalopathy     Essential hypertension     Intraventricular hemorrhage (H)     Hemiplegia (H)     ACP (advance care planning)     Physical deconditioning     Malnutrition (H24)     TBI (traumatic brain injury) (H)     Unspecified episodic mood disorder     Diastolic dysfunction     Seizure disorder (H)     Wound of right foot     Foot infection     Bipolar disorder (H)     Tobacco dependence     Septicemia (H)     S/P coil embolization of cerebral aneurysm     Pulmonary hypertension (H)     PTSD (post-traumatic stress disorder)     CARMENZA (obstructive sleep apnea)     Neuropathy      Morbid obesity with BMI of 60.0-69.9, adult (H)     Obesity hypoventilation syndrome (H)     Microscopic hematuria     Lung nodules     Hyperglycemia     Hyperlipidemia, unspecified hyperlipidemia type     History of methamphetamine abuse (H)     Hemiparesis affecting right side as late effect of cerebrovascular accident (CVA) (H)     Heartburn     History of CVA (cerebrovascular accident)     Chest pain     Anxiety     Arteriovenous malformation of brain     Aphasia as late effect of cerebrovascular accident     Alcohol abuse, episodic     Adjustment disorder with depressed mood     Acute on chronic diastolic CHF (congestive heart failure) (H)     Lymphedema     Chronic pain     Hemiplegia of dominant side as late effect of cerebrovascular disease (H)     Impaired mobility     Mild persistent asthma without complication     Neuropathy of both feet     Shoulder joint pain     Type 2 diabetes mellitus without complication, without long-term current use of insulin (H)     Respiratory failure (H)     Nocturnal hypoxemia     Brain aneurysm     Diabetes: Type 2 DM- on Metformin,   Hgb A1c: n/a patient report of recent A1c at 6%    Progress on goals from last visit: Patient reports she is bedridden and is not able to walk d/t pain and wounds at her feet. Sees the podiatrist. Continues to smoke. Reports she is drinking mostly diet beverages. Needs to improve her water intake. Continues to work on limiting fast food intake.    Continue to work on reducing calorie containing beverages, working on increased water intake.   Limit/eliminate alcoholic beverages daily - in progress  Increase lean protein at meals - pair with fruit or vegetable - not met       Breakfast: skips  Lunch: MOW Or fast food ([employer])   Dinner: MOW Or whatever her mother cooks, Goulash or pasta with turkey meatballs   Snakcs: fruit when available, greek yogurt   Beverages:   Water (needs to increase consumption)  Diet soda 2 cans/day- occasionally  Mt Dew  Coffee 2 cups/day   Alcohol: 2 drinks/day  Exercise: No set regimen-continues to struggle with wound issues on her feet     Nutrition Diagnosis:    Morbid obesity related to overeating and poor lifestyle habits as evidenced by patient's subjective statements and BMI 71.3 kg/m2.     Intervention:  Food and/or nutrient delivery: balanced meals, adequate protein, limit liquid calories. Consistency with meal time.  Nutrition education: goal setting  Nutrition counseling: provided support and encouragement     Monitoring/Evaluation:    Goals:  Avoid skipping meals   Continue to work on reducing calorie containing beverages, working on increased water intake.   Limit/eliminate alcoholic beverages daily  Increase lean protein at meals - pair with fruit or vegetable     Patient to follow up in 2 month(s) with RD        Phone call duration: 18 minutes        Originating Location (pt. Location): Home      Distant Location (provider location):  Off-site    Mode of Communication:  Video Conference via [facility]    Physician has received verbal consent for a Video Visit from the patient? Yes      Elisha Morales RD            Again, thank you for allowing me to participate in the care of your patient.        Sincerely,        Elisha Morales RD

## 2024-05-21 NOTE — PROGRESS NOTES
__________________________________  ESTABLISHED PATIENT NEUROLOGY NOTE    DATE OF VISIT: 5/23/2024  MRN: [id number]  PATIENT NAME: Marlo Kearns  YOB: 1980    Chief Complaint   Patient presents with    Seizures     Patient  is seizure free. No concerns. Medication refills.     SUBJECTIVE:                                                      HISTORY OF PRESENT ILLNESS:  Marlo is here for follow up regarding seizure    Marlo Kearns is a 43 year old female who presented to the clinic for evaluation of seizures in 2021.  She is followed by Dr. Retana, last seen 9/29/2021.  Per chart review, Patient had her initial stroke in 2015.  She had a ruptured cerebral AVM.  She had right-sided weakness with that.  She was on the toilet and then felt dizzy and fell forward.  She then does not remember exactly what happened but was found to have the AVM and was in coma.  Did have embolization with no recurrence.  Last MRI was in 2017.     Since then she has had seizures involving some shaking of the right arm and leg followed by generalized shaking and passing out.  She does get a premonition of having a seizure.  She was initially started on Keppra and then lamotrigine was added due to incomplete control of her seizures.  She has been taking the Keppra and lamotrigine without any side effects.  EEGs have shown abnormality in the left cerebral hemisphere. She did have a generalized tonic-clonic seizure at age 18 before she had the rupture of the AVM.     05/23/24: Marlo presents to the clinic today for annual seizure follow-up.  She is accompanied by her dad.  Marlo states that she has had 4 seizures at 1 time and denies any seizures since 2018.  She denies loss of consciousness, zoning out or staring spells.  No involuntary muscle movement.  She tells me that she is on Keppra 750 mg twice daily.  She is tolerating this medication well without adverse effect.  She is no longer taking lamotrigine.  She is  "unsure when she stopped this medication.   -- Patient states that she has falls related to an injury to her foot/infection.  She has a nurse about every other day to help manage wound cares.     Current Anti-Seizure Medications:    levETIRAcetam (KEPPRA) 750 MG tablet -take 1 tablet by mouth twice daily    Perceived AED Side Effects: No    Medication Notes:   AED Medication Compliance:  compliant      Psycho-Social History: Marlo Kearns currently lives [place] with Dad. Highest level of education Some college Employment status: [employer]    Patient does smoke- 1/2 pack to 1 pack day , rare alcohol use, recreational drug use- \"sometimes weed\".     Date of last seizure: at 2018   Driving:  Currently patient is:  Not driving.     Explained driving restrictions as per state law. No driving until approved by appropriate state licensing authorities. It is a licensed 's responsibility to be aware of and comply with laws and regulations regarding driving privileges and loss of awareness or alteration in ability to maintain control of a motor vehicle.   Current Medications:   Current Outpatient Medications   Medication Sig Dispense Refill    acetaminophen (TYLENOL) 500 MG tablet [ACETAMINOPHEN (TYLENOL) 500 MG TABLET] Take 500 mg by mouth every 6 (six) hours as needed.       albuterol (PROVENTIL) 2.5 mg /3 mL (0.083 %) nebulizer solution [ALBUTEROL (PROVENTIL) 2.5 MG /3 ML (0.083 %) NEBULIZER SOLUTION] Take 3 mL (2.5 mg total) by nebulization every 4 (four) hours as needed for wheezing.  0    ARIPiprazole (ABILIFY) 5 MG tablet [ARIPIPRAZOLE (ABILIFY) 5 MG TABLET] TAKE 1 TABLET (5 MG TOTAL) BY MOUTH DAILY. 30 tablet 4    bisacodyl (DULCOLAX) 10 mg suppository [BISACODYL (DULCOLAX) 10 MG SUPPOSITORY] Daily as needed for constipation  0    bumetanide (BUMEX) 2 MG tablet [BUMETANIDE (BUMEX) 2 MG TABLET] Take 2 mg by mouth 2 (two) times a day.      collagenase ointment Apply 1 Application topically daily       diclofenac " sodium (VOLTAREN) 1 % Gel [DICLOFENAC SODIUM (VOLTAREN) 1 % GEL] Apply topically 2 (two) times a day.      famotidine (PEPCID) 20 MG tablet Take 20 mg by mouth 2 times daily       fish oil-omega-3 fatty acids 300-1,000 mg capsule Take 1 capsule by mouth 2 times daily       fluticasone propion-salmeteroL (ADVAIR) 100-50 mcg/dose DISKUS [FLUTICASONE PROPION-SALMETEROL (ADVAIR) 100-50 MCG/DOSE DISKUS] Inhale 1 puff every 12 (twelve) hours.      folic acid (FOLVITE) 1 MG tablet [FOLIC ACID (FOLVITE) 1 MG TABLET] Take 1 tablet (1 mg total) by mouth daily.  0    gabapentin (NEURONTIN) 300 MG capsule Take 600 mg by mouth 2 times daily       ipratropium-albuteroL (DUO-NEB) 0.5-2.5 mg/3 mL nebulizer [IPRATROPIUM-ALBUTEROL (DUO-NEB) 0.5-2.5 MG/3 ML NEBULIZER] Inhale every 4 (four) hours.      Rusty Therapuetic Nutrition (RUSTY) 7-7-1.5 gram PwPk [RUSTY THERAPUETIC NUTRITION (RUSTY) 7-7-1.5 GRAM PWPK] Take 1 packet by mouth 2 (two) times a day.  0    lamoTRIgine (LAMICTAL) 150 MG tablet Take 1 tablet (150 mg) by mouth 2 times daily **FOLLOW UP NEEDED FOR REFILLS** 6 tablet 0    levETIRAcetam (KEPPRA) 750 MG tablet Take 1 tablet (750 mg) by mouth 2 times daily 180 tablet 3    meclizine (ANTIVERT) 25 mg tablet [MECLIZINE (ANTIVERT) 25 MG TABLET] Take 25 mg by mouth 3 (three) times a day as needed for dizziness.      methocarbamoL (ROBAXIN) 750 MG tablet [METHOCARBAMOL (ROBAXIN) 750 MG TABLET] Take 1 tablet by mouth 2 (two) times a day.      miconazole nitrate (CRITIC-AID AF) 2 % Oint ointment [MICONAZOLE NITRATE (CRITIC-AID AF) 2 % OINT OINTMENT] Apply to perianal rash, anterior thigh  0    multivitamin (ONE A DAY) per tablet [MULTIVITAMIN (ONE A DAY) PER TABLET] Take 1 tablet by mouth daily.      multivitamin with minerals (THERA-M) 9 mg iron-400 mcg Tab tablet [MULTIVITAMIN WITH MINERALS (THERA-M) 9 MG IRON-400 MCG TAB TABLET] Take 1 tablet by mouth daily.  0    nicotine (NICODERM CQ) 14 mg/24 hr Place 1 patch onto the skin  daily       potassium chloride (K-DUR,KLOR-CON) 20 MEQ tablet [POTASSIUM CHLORIDE (K-DUR,KLOR-CON) 20 MEQ TABLET] Take 20 mEq by mouth 2 (two) times a day with meals.      spironolactone (ALDACTONE) 25 MG tablet [SPIRONOLACTONE (ALDACTONE) 25 MG TABLET] Take 25 mg by mouth daily before breakfast.      tiZANidine (ZANAFLEX) 2 MG tablet Take 1 tablet (2 mg) by mouth 3 times daily PLEASE CALL TO SCHEDULE FOLLOW UP APPT 90 tablet 0    torsemide (DEMADEX) 20 MG tablet Take 40 mg by mouth 2 times daily       venlafaxine (EFFEXOR-XR) 37.5 MG 24 hr capsule [VENLAFAXINE (EFFEXOR-XR) 37.5 MG 24 HR CAPSULE] TAKE 1 CAPSULE (37.5 MG TOTAL) BY MOUTH DAILY. 30 capsule 4    white petrolatum (AQUAPHOR NATURAL HEALING) 41 % Oint [WHITE PETROLATUM (AQUAPHOR NATURAL HEALING) 41 % OINT] To dry skin  0    amLODIPine (NORVASC) 10 MG tablet [AMLODIPINE (NORVASC) 10 MG TABLET] Take 1 tablet (10 mg total) by mouth every morning. Hold for SBP < 110 (Patient not taking: Reported on 5/23/2024)  0    cyclobenzaprine (FLEXERIL) 10 MG tablet TAKE 1 TABLET BY MOUTH EVERY NIGHT AT BEDTIME (Patient not taking: Reported on 5/23/2024)      liraglutide (VICTOZA) 18 MG/3ML solution Inject 1.8 mg Subcutaneous daily 3 mL 0    metoprolol tartrate (LOPRESSOR) 25 MG tablet [METOPROLOL TARTRATE (LOPRESSOR) 25 MG TABLET] Take 0.5 tablets (12.5 mg total) by mouth 2 (two) times a day. Hold for SBP < 100, HR < 60 (Patient not taking: Reported on 5/23/2024)  0    miscellaneous medical supply Misc [MISCELLANEOUS MEDICAL SUPPLY MISC] New CPAP machine for home use at pressure: 10-20 cmw , Heated humidifier x 1 q 5yr, water chamber x 1 q 6 mo,  chin strap x 1 q 6 mo,  Full Face Mask x 1 q 3mo, Full face cushion x 1 q mo, Heated PAP tubing x 1 q 3 mo, Headgear x 1 q 6 mo, non-disposable filters x 1 q 6 mo, disposable filter x 2 q mo; Length of Need: 99 months; Frequency of use: Daily (Patient not taking: Reported on 5/23/2024)      OLANZapine (ZYPREXA) 2.5 MG tablet  "[OLANZAPINE (ZYPREXA) 2.5 MG TABLET] TAKE 1 TABLET BY MOUTH THREE TIMES A DAY AS NEEDED FOR AGITATION 90 tablet 4    pen needle, diabetic (BD ULTRA-FINE HENNY PEN NEEDLE) 32 gauge x 5/32\" Ndle [PEN NEEDLE, DIABETIC (BD ULTRA-FINE HENNY PEN NEEDLE) 32 GAUGE X 5/32\" NDLE] USE WITH LIRAGULATIDE 100 each 4    tiZANidine (ZANAFLEX) 2 MG tablet Take 1 tablet (2 mg) by mouth 3 times daily **FOLLOW UP NEEDED FOR REFILLS** 42 tablet 0     No current facility-administered medications for this visit.     Past Medical History:   Patient  has a past medical history of Acute renal failure (H24), Acute renal failure (H24) (07/05/2014), MEGHA (acute kidney injury) (H24), Aphasia, Asthma, unspecified asthma severity, unspecified whether complicated, unspecified whether persistent (10/08/2020), AV malformation, acquired (H24), AVM (arteriovenous malformation) brain, Bipolar affective (H), Borderline personality disorder (H), Cellulitis (07/27/2020), Cerebral edema (H), Elevated glucose, Encephalopathy, Encephalopathy, Headache, Heart disease (10/26/2020), Hemiparesis (H), Hypertension, Hypertensive heart failure (H) (10/26/2020), Hypoxia (05/29/2019), Intraventricular hemorrhage (H), Mild intermittent asthma with acute exacerbation (10/08/2020), Morbid obesity (H), 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  Surgical History:  She  has a past surgical history that includes Ovarian Cyst Removal; other surgical history; Tonsillectomy; Picc (09/29/2018); and Incision And Drainage Foot (Right, 2018).  Family and Social History:  Reviewed, and she  reports that she has been " "smoking. She has never used smokeless tobacco. She reports that she does not currently use alcohol. She reports that she does not currently use drugs after having used the following drugs: Methamphetamines and Cocaine.  Reviewed, and family history includes Alcoholism in her brother and father; Arthritis in her father, maternal grandmother, and mother; Asthma in her brother, maternal grandmother, and mother; Diabetes in her maternal grandfather and mother; Heart Disease in her brother, maternal grandfather, and mother; Hyperlipidemia in her brother, maternal grandfather, and mother; Hypertension in her father, maternal grandfather, and mother; Kidney Disease in her maternal grandfather; Mental Illness in her mother; Obesity in her mother; Other - See Comments in her mother.    RECENT DIAGNOSTIC STUDIES:     Imaging:   MR BRAIN LTD WO CONTRAST   11/20/2017 11:48 AM   INDICATION: Transient ischemic attack; right-sided numbness.   CONCLUSION:   1.  No acute intracranial finding. No evidence for recent infarct, hemorrhage or mass.   2.  Stable parenchymal volume loss in the left temporoparietal region, associated with changes of arteriovenous malformation embolization.      REVIEW OF SYSTEMS:                                                      10-point review of systems is negative except as mentioned above in HPI.    EXAM:                                                      Physical Exam:   Vitals: BP (!) 140/79   Pulse 69   Ht 1.575 m (5' 2\")   Wt (!) 172.4 kg (380 lb)   BMI 69.50 kg/m    BMI= Body mass index is 69.5 kg/m .  GENERAL: NAD.  HEENT: NC/AT..  PULM: Non-labored breathing.   Neurologic:  MENTAL STATUS: Alert, attentive. Speech is fluent. Normal comprehension. Normal concentration. Adequate fund of knowledge.   CRANIAL NERVES: Visual fields intact to confrontation. Pupils equally, round and reactive to light. Facial sensation and movement normal. EOM full. Hearing intact to conversation. Trapezius " strength intact. Palate moves symmetrically. Tongue midline.  MOTOR: 5/5 in proximal and distal muscle groups of upper and lower extremities. Tone and bulk normal.   SENSATION: Normal light touch throughout.   COORDINATION: Normal finger nose finger. Finger tapping normal. Knee heel shin normal.  STATION AND GAIT: Patient up walking with walker, unsteady, Increased dyspnea with activity  Ambidextrous     ASSESSMENT and PLAN:                                                      Assessment:    ICD-10-CM    1. Seizure disorder (H)  G40.909 levETIRAcetam (KEPPRA) 750 MG tablet      2. Localization-related partial epilepsy with complex partial seizures (H)  G40.209 Keppra (Levetiracetam) Level          Marlo Kearns is a 43 year old female who presented to the clinic for evaluation of seizures in 2021.  She is followed by Dr. Retana, last seen 9/29/2021. Patient has remained seizure free on current treatment plan of Keppra 750 mg twice daily. I will order labs to monitor the drug parameters. We discussed interventions, will plan to see the patient back in 12 months. Marlo understands and agrees with this plan.     Plan:  --- Continue levETIRAcetam (KEPPRA) 750 MG tablet -take 1 tablet by mouth twice daily  --- Labs: keppra level  --- Take your medications as prescribed, and do not stop taking abruptly.  --- Try to take your anti-seizure medications at the same time every day  --- Avoid common triggers: sleep deprivation, excessive alcohol, stress, flashing lights or patterns, dehydration, recreational drug use, illness and missed medications.  --- Plan on follow up in the Neurology Clinic in 12 months with Dr. Retana.  --- Please feel free to reach out if you have any further questions or concerns.  --- Seek immediate medical attention if an emergency arises or if your health becomes progressively worse.     It was a pleasure to see you today!     Total Time: Total time spent for face to face visit, reviewing  labs/imaging studies, counseling and coordination of care was: 20 Minutes spent on the date of the encounter doing chart review, review of outside records, review of test results, interpretation of tests, patient visit, documentation, and discussion with family     This note was dictated using voice recognition software.  Any grammatical or context distortions are unintentional and inherent to the software.    Demetria Milian, DNP, APRN, CNP  [facility]

## 2024-05-23 NOTE — PATIENT INSTRUCTIONS
Plan:  --- Continue levETIRAcetam (KEPPRA) 750 MG tablet -take 1 tablet by mouth twice daily  --- Labs: keppra level  --- Take your medications as prescribed, and do not stop taking abruptly.  --- Try to take your anti-seizure medications at the same time every day  --- Avoid common triggers: sleep deprivation, excessive alcohol, stress, flashing lights or patterns, dehydration, recreational drug use, illness and missed medications.  --- Plan on follow up in the Neurology Clinic in 12 months with Dr. Retana.  --- Please feel free to reach out if you have any further questions or concerns.  --- Seek immediate medical attention if an emergency arises or if your health becomes progressively worse.     It was a pleasure to see you today!

## 2024-05-23 NOTE — LETTER
5/23/2024         RE: Marlo Kearns  [address]  [place] [address]        Dear Colleague,    Thank you for referring your patient, Marlo Kearns, to the [facility] [place]. Please see a copy of my visit note below.      __________________________________  ESTABLISHED PATIENT NEUROLOGY NOTE    DATE OF VISIT: 5/23/2024  MRN: [id number]  PATIENT NAME: Marlo Kearns  YOB: 1980    Chief Complaint   Patient presents with     Seizures     Patient  is seizure free. No concerns. Medication refills.     SUBJECTIVE:                                                      HISTORY OF PRESENT ILLNESS:  Marlo is here for follow up regarding seizure    Marlo Kearns is a 43 year old female who presented to the clinic for evaluation of seizures in 2021.  She is followed by Dr. Retana, last seen 9/29/2021.  Per chart review, Patient had her initial stroke in 2015.  She had a ruptured cerebral AVM.  She had right-sided weakness with that.  She was on the toilet and then felt dizzy and fell forward.  She then does not remember exactly what happened but was found to have the AVM and was in coma.  Did have embolization with no recurrence.  Last MRI was in 2017.     Since then she has had seizures involving some shaking of the right arm and leg followed by generalized shaking and passing out.  She does get a premonition of having a seizure.  She was initially started on Keppra and then lamotrigine was added due to incomplete control of her seizures.  She has been taking the Keppra and lamotrigine without any side effects.  EEGs have shown abnormality in the left cerebral hemisphere. She did have a generalized tonic-clonic seizure at age 18 before she had the rupture of the AVM.     05/23/24: Marlo presents to the clinic today for annual seizure follow-up.  She is accompanied by her dad.  Marlo states that she has had 4 seizures at 1 time and denies any seizures since 2018.  She  "denies loss of consciousness, zoning out or staring spells.  No involuntary muscle movement.  She tells me that she is on Keppra 750 mg twice daily.  She is tolerating this medication well without adverse effect.  She is no longer taking lamotrigine.  She is unsure when she stopped this medication.   -- Patient states that she has falls related to an injury to her foot/infection.  She has a nurse about every other day to help manage wound cares.     Current Anti-Seizure Medications:    levETIRAcetam (KEPPRA) 750 MG tablet -take 1 tablet by mouth twice daily    Perceived AED Side Effects: No    Medication Notes:   AED Medication Compliance:  compliant      Psycho-Social History: Marlo Kearns currently lives [place] with Dad. Highest level of education Some college Employment status: [employer]    Patient does smoke- 1/2 pack to 1 pack day , rare alcohol use, recreational drug use- \"sometimes weed\".     Date of last seizure: at 2018   Driving:  Currently patient is:  Not driving.     Explained driving restrictions as per state law. No driving until approved by appropriate state licensing authorities. It is a licensed 's responsibility to be aware of and comply with laws and regulations regarding driving privileges and loss of awareness or alteration in ability to maintain control of a motor vehicle.   Current Medications:   Current Outpatient Medications   Medication Sig Dispense Refill     acetaminophen (TYLENOL) 500 MG tablet [ACETAMINOPHEN (TYLENOL) 500 MG TABLET] Take 500 mg by mouth every 6 (six) hours as needed.        albuterol (PROVENTIL) 2.5 mg /3 mL (0.083 %) nebulizer solution [ALBUTEROL (PROVENTIL) 2.5 MG /3 ML (0.083 %) NEBULIZER SOLUTION] Take 3 mL (2.5 mg total) by nebulization every 4 (four) hours as needed for wheezing.  0     ARIPiprazole (ABILIFY) 5 MG tablet [ARIPIPRAZOLE (ABILIFY) 5 MG TABLET] TAKE 1 TABLET (5 MG TOTAL) BY MOUTH DAILY. 30 tablet 4     bisacodyl (DULCOLAX) 10 mg suppository " [BISACODYL (DULCOLAX) 10 MG SUPPOSITORY] Daily as needed for constipation  0     bumetanide (BUMEX) 2 MG tablet [BUMETANIDE (BUMEX) 2 MG TABLET] Take 2 mg by mouth 2 (two) times a day.       collagenase ointment Apply 1 Application topically daily        diclofenac sodium (VOLTAREN) 1 % Gel [DICLOFENAC SODIUM (VOLTAREN) 1 % GEL] Apply topically 2 (two) times a day.       famotidine (PEPCID) 20 MG tablet Take 20 mg by mouth 2 times daily        fish oil-omega-3 fatty acids 300-1,000 mg capsule Take 1 capsule by mouth 2 times daily        fluticasone propion-salmeteroL (ADVAIR) 100-50 mcg/dose DISKUS [FLUTICASONE PROPION-SALMETEROL (ADVAIR) 100-50 MCG/DOSE DISKUS] Inhale 1 puff every 12 (twelve) hours.       folic acid (FOLVITE) 1 MG tablet [FOLIC ACID (FOLVITE) 1 MG TABLET] Take 1 tablet (1 mg total) by mouth daily.  0     gabapentin (NEURONTIN) 300 MG capsule Take 600 mg by mouth 2 times daily        ipratropium-albuteroL (DUO-NEB) 0.5-2.5 mg/3 mL nebulizer [IPRATROPIUM-ALBUTEROL (DUO-NEB) 0.5-2.5 MG/3 ML NEBULIZER] Inhale every 4 (four) hours.       Rusty Therapuetic Nutrition (RUSTY) 7-7-1.5 gram PwPk [RUSTY THERAPUETIC NUTRITION (RUSTY) 7-7-1.5 GRAM PWPK] Take 1 packet by mouth 2 (two) times a day.  0     lamoTRIgine (LAMICTAL) 150 MG tablet Take 1 tablet (150 mg) by mouth 2 times daily **FOLLOW UP NEEDED FOR REFILLS** 6 tablet 0     levETIRAcetam (KEPPRA) 750 MG tablet Take 1 tablet (750 mg) by mouth 2 times daily 180 tablet 3     meclizine (ANTIVERT) 25 mg tablet [MECLIZINE (ANTIVERT) 25 MG TABLET] Take 25 mg by mouth 3 (three) times a day as needed for dizziness.       methocarbamoL (ROBAXIN) 750 MG tablet [METHOCARBAMOL (ROBAXIN) 750 MG TABLET] Take 1 tablet by mouth 2 (two) times a day.       miconazole nitrate (CRITIC-AID AF) 2 % Oint ointment [MICONAZOLE NITRATE (CRITIC-AID AF) 2 % OINT OINTMENT] Apply to perianal rash, anterior thigh  0     multivitamin (ONE A DAY) per tablet [MULTIVITAMIN (ONE A DAY) PER  TABLET] Take 1 tablet by mouth daily.       multivitamin with minerals (THERA-M) 9 mg iron-400 mcg Tab tablet [MULTIVITAMIN WITH MINERALS (THERA-M) 9 MG IRON-400 MCG TAB TABLET] Take 1 tablet by mouth daily.  0     nicotine (NICODERM CQ) 14 mg/24 hr Place 1 patch onto the skin daily        potassium chloride (K-DUR,KLOR-CON) 20 MEQ tablet [POTASSIUM CHLORIDE (K-DUR,KLOR-CON) 20 MEQ TABLET] Take 20 mEq by mouth 2 (two) times a day with meals.       spironolactone (ALDACTONE) 25 MG tablet [SPIRONOLACTONE (ALDACTONE) 25 MG TABLET] Take 25 mg by mouth daily before breakfast.       tiZANidine (ZANAFLEX) 2 MG tablet Take 1 tablet (2 mg) by mouth 3 times daily PLEASE CALL TO SCHEDULE FOLLOW UP APPT 90 tablet 0     torsemide (DEMADEX) 20 MG tablet Take 40 mg by mouth 2 times daily        venlafaxine (EFFEXOR-XR) 37.5 MG 24 hr capsule [VENLAFAXINE (EFFEXOR-XR) 37.5 MG 24 HR CAPSULE] TAKE 1 CAPSULE (37.5 MG TOTAL) BY MOUTH DAILY. 30 capsule 4     white petrolatum (AQUAPHOR NATURAL HEALING) 41 % Oint [WHITE PETROLATUM (AQUAPHOR NATURAL HEALING) 41 % OINT] To dry skin  0     amLODIPine (NORVASC) 10 MG tablet [AMLODIPINE (NORVASC) 10 MG TABLET] Take 1 tablet (10 mg total) by mouth every morning. Hold for SBP < 110 (Patient not taking: Reported on 5/23/2024)  0     cyclobenzaprine (FLEXERIL) 10 MG tablet TAKE 1 TABLET BY MOUTH EVERY NIGHT AT BEDTIME (Patient not taking: Reported on 5/23/2024)       liraglutide (VICTOZA) 18 MG/3ML solution Inject 1.8 mg Subcutaneous daily 3 mL 0     metoprolol tartrate (LOPRESSOR) 25 MG tablet [METOPROLOL TARTRATE (LOPRESSOR) 25 MG TABLET] Take 0.5 tablets (12.5 mg total) by mouth 2 (two) times a day. Hold for SBP < 100, HR < 60 (Patient not taking: Reported on 5/23/2024)  0     miscellaneous medical supply Misc [MISCELLANEOUS MEDICAL SUPPLY MISC] New CPAP machine for home use at pressure: 10-20 cmw , Heated humidifier x 1 q 5yr, water chamber x 1 q 6 mo,  chin strap x 1 q 6 mo,  Full Face Mask x  "1 q 3mo, Full face cushion x 1 q mo, Heated PAP tubing x 1 q 3 mo, Headgear x 1 q 6 mo, non-disposable filters x 1 q 6 mo, disposable filter x 2 q mo; Length of Need: 99 months; Frequency of use: Daily (Patient not taking: Reported on 5/23/2024)       OLANZapine (ZYPREXA) 2.5 MG tablet [OLANZAPINE (ZYPREXA) 2.5 MG TABLET] TAKE 1 TABLET BY MOUTH THREE TIMES A DAY AS NEEDED FOR AGITATION 90 tablet 4     pen needle, diabetic (BD ULTRA-FINE HENNY PEN NEEDLE) 32 gauge x 5/32\" Ndle [PEN NEEDLE, DIABETIC (BD ULTRA-FINE HENNY PEN NEEDLE) 32 GAUGE X 5/32\" NDLE] USE WITH LIRAGULATIDE 100 each 4     tiZANidine (ZANAFLEX) 2 MG tablet Take 1 tablet (2 mg) by mouth 3 times daily **FOLLOW UP NEEDED FOR REFILLS** 42 tablet 0     No current facility-administered medications for this visit.     Past Medical History:   Patient  has a past medical history of Acute renal failure (H24), Acute renal failure (H24) (07/05/2014), MEGHA (acute kidney injury) (H24), Aphasia, Asthma, unspecified asthma severity, unspecified whether complicated, unspecified whether persistent (10/08/2020), AV malformation, acquired (H24), AVM (arteriovenous malformation) brain, Bipolar affective (H), Borderline personality disorder (H), Cellulitis (07/27/2020), Cerebral edema (H), Elevated glucose, Encephalopathy, Encephalopathy, Headache, Heart disease (10/26/2020), Hemiparesis (H), Hypertension, Hypertensive heart failure (H) (10/26/2020), Hypoxia (05/29/2019), Intraventricular hemorrhage (H), Mild intermittent asthma with acute exacerbation (10/08/2020), Morbid obesity (H), 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  Surgical History:  She  has a past surgical history that includes Ovarian Cyst Removal; other surgical history; Tonsillectomy; Picc (09/29/2018); and Incision And Drainage Foot (Right, 2018).  Family and Social History:  Reviewed, and she  reports that she has been smoking. She has never used smokeless tobacco. She reports that she does not currently use alcohol. She reports that she does not currently use drugs after having used the following drugs: Methamphetamines and Cocaine.  Reviewed, and family history includes Alcoholism in her brother and father; Arthritis in her father, maternal grandmother, and mother; Asthma in her brother, maternal grandmother, and mother; Diabetes in her maternal grandfather and mother; Heart Disease in her brother, maternal grandfather, and mother; Hyperlipidemia in her brother, maternal grandfather, and mother; Hypertension in her father, maternal grandfather, and mother; Kidney Disease in her maternal grandfather; Mental Illness in her mother; Obesity in her mother; Other - See Comments in her mother.    RECENT DIAGNOSTIC STUDIES:     Imaging:   [facility] ITC BRAIN LTD WO CONTRAST   11/20/2017 11:48 AM   INDICATION: Transient ischemic attack; right-sided numbness.   CONCLUSION:   1.  No acute intracranial finding. No evidence for recent infarct, hemorrhage or mass.   2.  Stable parenchymal volume loss in the left temporoparietal region, associated with changes of arteriovenous malformation embolization.      REVIEW OF SYSTEMS:                                                      10-point review of systems is negative except as mentioned above in HPI.    EXAM:                                                      Physical Exam:   Vitals: BP (!) 140/79   Pulse 69   Ht 1.575 m (5' 2\")   Wt (!) 172.4 kg (380 lb)   BMI 69.50 kg/m    BMI= Body mass index is 69.5 kg/m .  GENERAL: NAD.  HEENT: NC/AT..  PULM: Non-labored breathing.   Neurologic:  MENTAL STATUS: Alert, " attentive. Speech is fluent. Normal comprehension. Normal concentration. Adequate fund of knowledge.   CRANIAL NERVES: Visual fields intact to confrontation. Pupils equally, round and reactive to light. Facial sensation and movement normal. EOM full. Hearing intact to conversation. Trapezius strength intact. Palate moves symmetrically. Tongue midline.  MOTOR: 5/5 in proximal and distal muscle groups of upper and lower extremities. Tone and bulk normal.   SENSATION: Normal light touch throughout.   COORDINATION: Normal finger nose finger. Finger tapping normal. Knee heel shin normal.  STATION AND GAIT: Patient up walking with walker, unsteady, Increased dyspnea with activity  Ambidextrous     ASSESSMENT and PLAN:                                                      Assessment:    ICD-10-CM    1. Seizure disorder (H)  G40.909 levETIRAcetam (KEPPRA) 750 MG tablet      2. Localization-related partial epilepsy with complex partial seizures (H)  G40.209 Keppra (Levetiracetam) Level          Marlo Kearns is a 43 year old female who presented to the clinic for evaluation of seizures in 2021.  She is followed by Dr. Retana, last seen 9/29/2021. Patient has remained seizure free on current treatment plan of Keppra 750 mg twice daily. I will order labs to monitor the drug parameters. We discussed interventions, will plan to see the patient back in 12 months. Marlo understands and agrees with this plan.     Plan:  --- Continue levETIRAcetam (KEPPRA) 750 MG tablet -take 1 tablet by mouth twice daily  --- Labs: keppra level  --- Take your medications as prescribed, and do not stop taking abruptly.  --- Try to take your anti-seizure medications at the same time every day  --- Avoid common triggers: sleep deprivation, excessive alcohol, stress, flashing lights or patterns, dehydration, recreational drug use, illness and missed medications.  --- Plan on follow up in the Neurology Clinic in 12 months with Dr. Retana.  ---  Please feel free to reach out if you have any further questions or concerns.  --- Seek immediate medical attention if an emergency arises or if your health becomes progressively worse.     It was a pleasure to see you today!     Total Time: Total time spent for face to face visit, reviewing labs/imaging studies, counseling and coordination of care was: 20 Minutes spent on the date of the encounter doing chart review, review of outside records, review of test results, interpretation of tests, patient visit, documentation, and discussion with family     This note was dictated using voice recognition software.  Any grammatical or context distortions are unintentional and inherent to the software.    Demetria Milian, RUBIN, APRN, CNP  [facility]                     Again, thank you for allowing me to participate in the care of your patient.        Sincerely,        CHINO Vergara CNP

## 2024-05-23 NOTE — NURSING NOTE
Chief Complaint   Patient presents with    Seizures     Patient  is seizure free. No concerns. Medication refills.     Maryellen Kelley on 5/23/2024 at 10:50 AM